# Patient Record
Sex: MALE | Race: WHITE | NOT HISPANIC OR LATINO | Employment: OTHER | URBAN - METROPOLITAN AREA
[De-identification: names, ages, dates, MRNs, and addresses within clinical notes are randomized per-mention and may not be internally consistent; named-entity substitution may affect disease eponyms.]

---

## 2017-01-09 ENCOUNTER — MEDICAL CORRESPONDENCE (OUTPATIENT)
Dept: HEALTH INFORMATION MANAGEMENT | Facility: CLINIC | Age: 72
End: 2017-01-09

## 2017-01-22 DIAGNOSIS — E09.9 DIABETES MELLITUS, DRUG-RELATED (H): Primary | ICD-10-CM

## 2017-01-22 NOTE — TELEPHONE ENCOUNTER
blood glucose monitoring (NO BRAND SPECIFIED) test strip      Last Written Prescription Date: 12-  Last Fill Quantity: 300 each,  # refills: 3   Last Office Visit with [facility], [facility] or [facility] prescribing provider: 08-

## 2017-01-23 NOTE — TELEPHONE ENCOUNTER
Prescription approved per [facility] [place] [place] Refill Protocol.  Viky Can, RN  Triage Nurse

## 2017-02-07 ENCOUNTER — TRANSFERRED RECORDS (OUTPATIENT)
Dept: HEALTH INFORMATION MANAGEMENT | Facility: CLINIC | Age: 72
End: 2017-02-07

## 2017-02-13 ENCOUNTER — TRANSFERRED RECORDS (OUTPATIENT)
Dept: HEALTH INFORMATION MANAGEMENT | Facility: CLINIC | Age: 72
End: 2017-02-13

## 2017-02-13 LAB
ALT SERPL-CCNC: 21 U/L (ref 5–40)
AST SERPL-CCNC: 18 U/L (ref 5–34)
CREAT SERPL-MCNC: 1.02 MG/DL (ref 0.5–1.3)

## 2017-03-20 ENCOUNTER — OFFICE VISIT (OUTPATIENT)
Dept: PEDIATRICS | Facility: CLINIC | Age: 72
End: 2017-03-20
Payer: COMMERCIAL

## 2017-03-20 VITALS
SYSTOLIC BLOOD PRESSURE: 104 MMHG | DIASTOLIC BLOOD PRESSURE: 50 MMHG | TEMPERATURE: 97.4 F | BODY MASS INDEX: 25.09 KG/M2 | HEART RATE: 72 BPM | HEIGHT: 75 IN | OXYGEN SATURATION: 96 % | WEIGHT: 201.8 LBS

## 2017-03-20 DIAGNOSIS — K68.2 RETROPERITONEAL FIBROSIS: ICD-10-CM

## 2017-03-20 DIAGNOSIS — L98.9 SKIN LESION: ICD-10-CM

## 2017-03-20 DIAGNOSIS — N18.1 TYPE 2 DIABETES MELLITUS WITH STAGE 1 CHRONIC KIDNEY DISEASE, WITH LONG-TERM CURRENT USE OF INSULIN (H): Primary | ICD-10-CM

## 2017-03-20 DIAGNOSIS — J44.9 CHRONIC OBSTRUCTIVE PULMONARY DISEASE, UNSPECIFIED COPD TYPE (H): ICD-10-CM

## 2017-03-20 DIAGNOSIS — Z79.4 TYPE 2 DIABETES MELLITUS WITH STAGE 1 CHRONIC KIDNEY DISEASE, WITH LONG-TERM CURRENT USE OF INSULIN (H): Primary | ICD-10-CM

## 2017-03-20 DIAGNOSIS — E11.22 TYPE 2 DIABETES MELLITUS WITH STAGE 1 CHRONIC KIDNEY DISEASE, WITH LONG-TERM CURRENT USE OF INSULIN (H): Primary | ICD-10-CM

## 2017-03-20 PROCEDURE — 99207 C FOOT EXAM  NO CHARGE: CPT | Performed by: INTERNAL MEDICINE

## 2017-03-20 PROCEDURE — 99214 OFFICE O/P EST MOD 30 MIN: CPT | Performed by: INTERNAL MEDICINE

## 2017-03-20 NOTE — LETTER
My COPD Action Plan   Name: Lauri Mcgee    YOB: 1945   Date: 3/20/2017    My doctor: Yenni Barber MD   My clinic: [address] [address] [phone]  My Controller Medicine: anoro   Dose:      My Rescue Medicine: Albuterol (Proair/Ventolin/Proventil) inhaler   Dose:      My Flare Up Medicine: prednisone   Dose:   My COPD Severity: Severe = FeV1 < 30%-49%     Use of Oxygen: Oxygen Not Prescribed         GREEN ZONE       Doing well today      Usual level of activity and exercise    Usual amount of cough and mucus    No shortness of breath    Usual level of health (thinking clearly, sleeping well, feel like eating) Actions:      Take daily medicines    Use oxygen as prescribed    Follow regular exercise and diet plan    Avoid cigarette smoke and other irritants that harm the lungs           YELLOW ZONE          Having a bad day or flare up      Short of breath more than usual    A lot more sputum (mucus) than usual    Sputum looks yellow, green, tan, brown or bloody    More coughing or wheezing    Fever or chills    Less energy; trouble completing activities    Trouble thinking or focusing    Using quick relief inhaler or nebulizer more often    Poor sleep; symptoms wake me up    Do not feel like eating Actions:      Get plenty of rest    Take daily medicines    Use quick relief inhaler every 2-4 hours    If you use oxygen, call you doctor to see if you should adjust your oxygen    Do breathing exercises or other things to help you relax    Let a loved one, friend or neighbor know you are feeling worse    Call your care team if you have 2 or more symptoms.  Start taking steroids or antibiotics if directed by your care team           RED ZONE       Need medical care now      Severe shortness of breath (feel you can't breathe)    Fever, chills    Not enough breath to do any activity    Trouble coughing up mucus, walking or  talking    Blood in mucus    Frequent coughing   Rescue medicines are not working    Not able to sleep because of breathing    Feel confused or drowsy    Chest pain    Actions:      Call your health care team.  If you cannot reach your care team, call 911 or go to the emergency room.        Electronically signed by: Yenni Barber, March 20, 2017  Annual Reminders:  Meet with Care Team, Flu Shot every Fall and Pneumonia Shot at least once  Pharmacy:    [facility] [place], [place] - [address] [facility] [place], [place] - [address]

## 2017-03-20 NOTE — NURSING NOTE
"Chief Complaint   Patient presents with     Diabetes       Initial /50 (Cuff Size: Adult Large)  Pulse 72  Temp 97.4  F (36.3  C)  Ht 6' 2.5\" (1.892 m)  Wt 201 lb 12.8 oz (91.5 kg)  SpO2 96%  BMI 25.56 kg/m2 Estimated body mass index is 25.56 kg/(m^2) as calculated from the following:    Height as of this encounter: 6' 2.5\" (1.892 m).    Weight as of this encounter: 201 lb 12.8 oz (91.5 kg).  Medication Reconciliation: complete   Alessandra Mendoza LPN    "

## 2017-03-20 NOTE — PROGRESS NOTES
SUBJECTIVE:                                                    Lauri Mcgee is a 71 year old male who presents to clinic today for the following health issues:        Diabetes Follow-up    Patient is checking blood sugars: once daily.  Results are as follows:         Random during the day,     Diabetic concerns: None     Symptoms of hypoglycemia (low blood sugar): none     Paresthesias (numbness or burning in feet) or sores: No     Date of last diabetic eye exam: 11/23/16     Hyperlipidemia Follow-Up      Rate your low fat/cholesterol diet?: not monitoring fat    Taking statin?  Yes, no muscle aches from statin    Other lipid medications/supplements?:  none     Hypertension Follow-up      Outpatient blood pressures are not being checked.    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  Additional history: as documented    Patient Active Problem List   Diagnosis     Retroperitoneal fibrosis     Other specified gastritis without mention of hemorrhage     CARDIOVASCULAR SCREENING; LDL GOAL LESS THAN 130     Advanced directives, counseling/discussion     Tubular adenoma of colon     COPD (chronic obstructive pulmonary disease) (H)     Type 2 diabetes mellitus with stage 1 chronic kidney disease, with long-term current use of insulin (H)     Past Surgical History   Procedure Laterality Date     C nonspecific procedure  1971     surgery for herniated disk     Tonsillectomy       Cardiac nuc kannan stress test nl  7/26/06     normal cardiolyte test       Social History   Substance Use Topics     Smoking status: Former Smoker     Packs/day: 0.50     Years: 47.00     Types: Cigarettes      "Quit date: 10/1/2013     Smokeless tobacco: Never Used      Comment: occ     Alcohol use 0.0 oz/week     0 Standard drinks or equivalent per week     Family History   Problem Relation Age of Onset     CEREBROVASCULAR DISEASE Father      HEART DISEASE Father      Circulatory Mother      anurism     Type 2 Diabetes Brother      Type 2 Diabetes Sister      Type 2 Diabetes Paternal Grandfather            Reviewed and updated as needed this visit by clinical staff  Tobacco  Allergies  Meds  Med Hx  Surg Hx  Fam Hx  Soc Hx      Reviewed and updated as needed this visit by Provider         ROS:  ROS of systems including Constitutional, Eyes, Respiratory, Cardiovascular, Gastroenterology, Genitourinary, Integumentary, Muscularskeletal, Psychiatric were all negative except for pertinent positives noted in my HPI.      OBJECTIVE:                                                    /50 (Cuff Size: Adult Large)  Pulse 72  Temp 97.4  F (36.3  C)  Ht 6' 2.5\" (1.892 m)  Wt 201 lb 12.8 oz (91.5 kg)  SpO2 96%  BMI 25.56 kg/m2  Body mass index is 25.56 kg/(m^2).  GENERAL: healthy, alert and no distress  NECK: no adenopathy, no asymmetry, masses, or scars and thyroid normal to palpation  RESP: lungs clear to auscultation - no rales, rhonchi or wheezes  CV: regular rate and rhythm, normal S1 S2, no S3 or S4, no murmur, click or rub, no peripheral edema and peripheral pulses strong  ABDOMEN: soft, nontender, no hepatosplenomegaly, no masses and bowel sounds normal  MS: no gross musculoskeletal defects noted, no edema  SKIN: R elbow with 3mm white scaly papule  Diabetic foot exam: normal DP and PT pulses, no trophic changes or ulcerative lesions, normal sensory exam and normal monofilament exam    Diagnostic Test Results:  none      ASSESSMENT/PLAN:                                                      1. Type 2 diabetes mellitus with stage 1 chronic kidney disease, with long-term current use of insulin (H)  - **A1C FUTURE " anytime; Future  - FOOT EXAM  - **Lipid panel reflex to direct LDL FUTURE anytime; Future  - **CBC with platelets FUTURE anytime; Future  - DIABETES EDUCATOR REFERRAL    2. Retroperitoneal fibrosis  Continues to follow with rheum and urology    3. Chronic obstructive pulmonary disease, unspecified COPD type (H)  Doing well  - COPD ACTION PLAN    4. Skin lesion  Possible AK.   - DERMATOLOGY REFERRAL    Patient Instructions   Let's keep your medications the same.  I really don't think you need to keep doing the novolin, but if you want to continue your sliding scale for now, you can.  Set up an appointment with diabetes education.  Call for an appointment with Dr. Guerra, dermatology.    Call to schedule a fasting lab appointment, [phone], or make an appointment online.    recommended he come in for preventive visit in 6 months.    Yenni Barber MD  [facility]

## 2017-03-20 NOTE — MR AVS SNAPSHOT
After Visit Summary   3/20/2017    Lauri Mcgee    MRN: [id number]           Patient Information     Date Of Birth          1945        Visit Information        Provider Department      3/20/2017 1:30 PM [facility]enni [facility] MD [facility]        Today's Diagnoses     Type 2 diabetes mellitus with stage 1 chronic kidney disease, with long-term current use of insulin (H)    -  1    Retroperitoneal fibrosis        Chronic obstructive pulmonary disease, unspecified COPD type (H)        Skin lesion          Care Instructions    Let's keep your medications the same.  I really don't think you need to keep doing the novolin, but if you want to continue your sliding scale for now, you can.  Set up an appointment with diabetes education.  Call for an appointment with Dr. Guerra, dermatology.    Call to schedule a fasting lab appointment, [phone], or make an appointment online.          Follow-ups after your visit        Additional Services     DERMATOLOGY REFERRAL       Your provider has referred you to: N: [facility] [phone]   [email or web address]    Please be aware that coverage of these services is subject to the terms and limitations of your health insurance plan.  Call member services at your health plan with any benefit or coverage questions.      Please bring the following with you to your appointment:    (1) Any X-Rays, CTs or MRIs which have been performed.  Contact the facility where they were done to arrange for  prior to your scheduled appointment.    (2) List of current medications  (3) This referral request   (4) Any documents/labs given to you for this referral            DIABETES EDUCATOR REFERRAL       DIABETES SELF MANAGEMENT TRAINING (DSMT)      Your provider has referred you to Diabetes Education: [facility]: Diabetes Education [facility] [phone]    [email or web address]    Type of training and number of hours: Previous Diagnosis: Follow-up DSMT - 2 hours.    Medicare covers: 10 hours of initial DSMT in 12 month period from the time of first visit, plus 2 hours of follow-up DSMT annually, and additional hours as requested for insulin training.    Diabetes Type: Type 2 - On Insulin             Diabetes Co-Morbidities: none               A1C Goal:  <7.0       A1C is: Lab Results       Component                Value               Date                       A1C                      6.3                 08/02/2016              If an urgent visit is needed or A1C is above 12, Care Team to call the Diabetes Education Team at [phone] or send an In Basket message to the Diabetes Education Pool (P DIAB ED-PATIENT CARE).    Diabetes Education Topics: Comprehensive Knowledge Assessment and Instruction    Special Educational Needs Requiring Individual DSMT: None       MEDICAL NUTRITION THERAPY (MNT) for Diabetes    Medical Nutrition Therapy with a Registered Dietitian can be provided in coordination with Diabetes Self-Management Training to assist in achieving optimal diabetes management.    MNT Type and Hours: Do not initiate MNT at this time.                       Medicare will cover: 3 hours initial MNT in 12 month period after first visit, plus 2 hours of follow-up MNT annually    Please be aware that coverage of these services is subject to the terms and limitations of your health insurance plan.  Call member services at your health plan to determine Diabetes Self-Management Training benefits and ask which blood glucose monitor brands are covered by your plan.      Please bring the following with you to your appointment:    (1)  List of current medications   (2)  List of Blood Glucose Monitor brands that are covered by your insurance plan  (3)  Blood Glucose Monitor and log book  (4)   Food records for the 3 days prior  "to your visit    The Certified Diabetes Educator may make diabetes medication adjustments per the CDE Protocol and Collaborative Practice Agreement.                  Future tests that were ordered for you today     Open Future Orders        Priority Expected Expires Ordered    **A1C FUTURE anytime Routine 3/20/2017 3/20/2018 3/20/2017    **Lipid panel reflex to direct LDL FUTURE anytime Routine 3/20/2017 3/20/2018 3/20/2017    **CBC with platelets FUTURE anytime Routine 3/20/2017 3/20/2018 3/20/2017            Who to contact     If you have questions or need follow up information about today's clinic visit or your schedule please contact [facility] [place] directly at [phone].  Normal or non-critical lab and imaging results will be communicated to you by Sayahhart, letter or phone within 4 business days after the clinic has received the results. If you do not hear from us within 7 days, please contact the clinic through [employer] or phone. If you have a critical or abnormal lab result, we will notify you by phone as soon as possible.  Submit refill requests through ApplyMap or call your pharmacy and they will forward the refill request to us. Please allow 3 business days for your refill to be completed.          Additional Information About Your Visit        [employer] Information     ApplyMap gives you secure access to your electronic health record. If you see a primary care provider, you can also send messages to your care team and make appointments. If you have questions, please call your primary care clinic.  If you do not have a primary care provider, please call [phone] and they will assist you.        Care EveryWhere ID     This is your Care EveryWhere ID. This could be used by other organizations to access your [facility] medical records  [id number]        Your Vitals Were     Pulse Temperature Height Pulse Oximetry BMI (Body Mass Index)       72 97.4  F (36.3  C) 6' 2.5\" (1.892 m) 96% 25.56 kg/m2     "    Blood Pressure from Last 3 Encounters:   03/20/17 104/50   08/02/16 108/60   01/11/16 116/62    Weight from Last 3 Encounters:   03/20/17 201 lb 12.8 oz (91.5 kg)   08/02/16 206 lb 12.8 oz (93.8 kg)   01/11/16 204 lb 3.2 oz (92.6 kg)              We Performed the Following     COPD ACTION PLAN     DERMATOLOGY REFERRAL     DIABETES EDUCATOR REFERRAL     FOOT EXAM        Primary Care Provider Office Phone # Fax #    Yenni Sewell [phone]       [facility] [address] [place] [address]        Thank you!     Thank you for choosing [facility]  for your care. Our goal is always to provide you with excellent care. Hearing back from our patients is one way we can continue to improve our services. Please take a few minutes to complete the written survey that you may receive in the mail after your visit with us. Thank you!             Your Updated Medication List - Protect others around you: Learn how to safely use, store and throw away your medicines at www.disposemymeds.org.          This list is accurate as of: 3/20/17  2:25 PM.  Always use your most recent med list.                   Brand Name Dispense Instructions for use    albuterol 90 MCG/ACT inhaler      Inhale 2 puffs into the lungs every 6 hours as needed.       aspirin 81 MG tablet      Take 1 tablet by mouth daily       atorvastatin 10 MG tablet    LIPITOR    90 tablet    Take 1 tablet (10 mg) by mouth daily       blood glucose monitoring lancets     3 Box    1 each daily Check blood glucose three times daily.       blood glucose monitoring test strip    no brand specified    300 each    1 strip by In Vitro route 3 times daily Contour next test strips       IMURAN 50 MG tablet   Generic drug:  azaTHIOprine     30 tablet    Take 150 mg by mouth daily.       insulin aspart 100 UNIT/ML injection    NovoLOG FLEXPEN    15 mL    Check your blood sugar before meals as directed. Cover with novolog as  follows: 151-200 = 2 units  201-250 = 4 units 251-300 = 6 units 301-350 = 8 units >350 = 10 units and recheck blood sugar in one hour.       metFORMIN 500 MG 24 hr tablet    GLUCOPHAGE-XR    360 tablet    Take 2 tablets (1,000 mg) by mouth 2 times daily (with meals)       omeprazole 20 MG CR capsule    priLOSEC    90    ONE DAILY       predniSONE 5 MG tablet    DELTASONE     Take 1 tablet by mouth daily       umeclidinium-vilanterol 62.5-25 MCG/INH oral inhaler    ANORO ELLIPTA     Inhale 1 puff into the lungs daily       vitamin D 1000 UNITS capsule      Take 1 capsule by mouth daily.

## 2017-03-20 NOTE — PATIENT INSTRUCTIONS
Let's keep your medications the same.  I really don't think you need to keep doing the novolin, but if you want to continue your sliding scale for now, you can.  Set up an appointment with diabetes education.  Call for an appointment with Dr. Guerra, dermatology.    Call to schedule a fasting lab appointment, [phone], or make an appointment online.

## 2017-03-21 DIAGNOSIS — N18.2 TYPE 2 DIABETES MELLITUS WITH STAGE 2 CHRONIC KIDNEY DISEASE (H): ICD-10-CM

## 2017-03-21 DIAGNOSIS — D53.9 MACROCYTIC ANEMIA: ICD-10-CM

## 2017-03-21 DIAGNOSIS — J44.9 CHRONIC OBSTRUCTIVE PULMONARY DISEASE, UNSPECIFIED COPD TYPE (H): ICD-10-CM

## 2017-03-21 DIAGNOSIS — E11.22 TYPE 2 DIABETES MELLITUS WITH STAGE 2 CHRONIC KIDNEY DISEASE (H): ICD-10-CM

## 2017-03-21 DIAGNOSIS — E11.22 TYPE 2 DIABETES MELLITUS WITH STAGE 1 CHRONIC KIDNEY DISEASE, WITH LONG-TERM CURRENT USE OF INSULIN (H): ICD-10-CM

## 2017-03-21 DIAGNOSIS — Z79.4 TYPE 2 DIABETES MELLITUS WITH STAGE 1 CHRONIC KIDNEY DISEASE, WITH LONG-TERM CURRENT USE OF INSULIN (H): ICD-10-CM

## 2017-03-21 DIAGNOSIS — N18.1 TYPE 2 DIABETES MELLITUS WITH STAGE 1 CHRONIC KIDNEY DISEASE, WITH LONG-TERM CURRENT USE OF INSULIN (H): ICD-10-CM

## 2017-03-21 DIAGNOSIS — R06.02 SOBOE (SHORTNESS OF BREATH ON EXERTION): ICD-10-CM

## 2017-03-21 LAB
CHOLEST SERPL-MCNC: 120 MG/DL
ERYTHROCYTE [DISTWIDTH] IN BLOOD BY AUTOMATED COUNT: 13.4 % (ref 10–15)
HBA1C MFR BLD: 6.1 % (ref 4.3–6)
HCT VFR BLD AUTO: 40.2 % (ref 40–53)
HDLC SERPL-MCNC: 51 MG/DL
HGB BLD-MCNC: 13.6 G/DL (ref 13.3–17.7)
IRON SATN MFR SERPL: 28 % (ref 15–46)
IRON SERPL-MCNC: 101 UG/DL (ref 35–180)
LDLC SERPL CALC-MCNC: 53 MG/DL
MCH RBC QN AUTO: 33.5 PG (ref 26.5–33)
MCHC RBC AUTO-ENTMCNC: 33.8 G/DL (ref 31.5–36.5)
MCV RBC AUTO: 99 FL (ref 78–100)
NONHDLC SERPL-MCNC: 69 MG/DL
PLATELET # BLD AUTO: 208 10E9/L (ref 150–450)
RBC # BLD AUTO: 4.06 10E12/L (ref 4.4–5.9)
TIBC SERPL-MCNC: 355 UG/DL (ref 240–430)
TRIGL SERPL-MCNC: 78 MG/DL
VIT B12 SERPL-MCNC: 215 PG/ML (ref 193–986)
WBC # BLD AUTO: 4.8 10E9/L (ref 4–11)

## 2017-03-21 PROCEDURE — 85027 COMPLETE CBC AUTOMATED: CPT | Performed by: INTERNAL MEDICINE

## 2017-03-21 PROCEDURE — 83550 IRON BINDING TEST: CPT | Performed by: INTERNAL MEDICINE

## 2017-03-21 PROCEDURE — 82607 VITAMIN B-12: CPT | Performed by: INTERNAL MEDICINE

## 2017-03-21 PROCEDURE — 36415 COLL VENOUS BLD VENIPUNCTURE: CPT | Performed by: INTERNAL MEDICINE

## 2017-03-21 PROCEDURE — 83036 HEMOGLOBIN GLYCOSYLATED A1C: CPT | Performed by: INTERNAL MEDICINE

## 2017-03-21 PROCEDURE — 83540 ASSAY OF IRON: CPT | Performed by: INTERNAL MEDICINE

## 2017-03-21 PROCEDURE — 80061 LIPID PANEL: CPT | Performed by: INTERNAL MEDICINE

## 2017-04-10 ENCOUNTER — ALLIED HEALTH/NURSE VISIT (OUTPATIENT)
Dept: EDUCATION SERVICES | Facility: CLINIC | Age: 72
End: 2017-04-10
Payer: COMMERCIAL

## 2017-04-10 DIAGNOSIS — N18.1 TYPE 2 DIABETES MELLITUS WITH STAGE 1 CHRONIC KIDNEY DISEASE, WITH LONG-TERM CURRENT USE OF INSULIN (H): Primary | ICD-10-CM

## 2017-04-10 DIAGNOSIS — E11.22 TYPE 2 DIABETES MELLITUS WITH STAGE 1 CHRONIC KIDNEY DISEASE, WITH LONG-TERM CURRENT USE OF INSULIN (H): Primary | ICD-10-CM

## 2017-04-10 DIAGNOSIS — Z79.4 TYPE 2 DIABETES MELLITUS WITH STAGE 1 CHRONIC KIDNEY DISEASE, WITH LONG-TERM CURRENT USE OF INSULIN (H): Primary | ICD-10-CM

## 2017-04-10 PROCEDURE — 99207 ZZC NO CHARGE LOS: CPT

## 2017-04-10 NOTE — NURSING NOTE
Diabetes Self Management Training: Individual Review Visit    Lauri Mcgee presents today for education related to Type 2 diabetes.    He is accompanied by self    Patient's diabetes management related comments/concerns: reports no concerns at this time    Patient's emotional response to diabetes: expresses readiness to learn and concern for health and well-being    Patient would like this visit to be focused around the following diabetes-related behaviors and goals: review    ASSESSMENT:  Patient Problem List and Family Medical History reviewed for relevant medical history, current medical status, and diabetes risk factors.    Current Diabetes Management per Patient:     Diabetes Medication(s)     Biguanides Sig    metFORMIN (GLUCOPHAGE-XR) 500 MG 24 hr tablet Take 2 tablets (1,000 mg) by mouth 2 times daily (with meals)    Insulin Sig    insulin aspart (NOVOLOG FLEXPEN) 100 UNIT/ML soln Check your blood sugar before meals as directed. Cover with novolog as follows:  151-200 = 2 units   201-250 = 4 units  251-300 = 6 units  301-350 = 8 units  >350 = 10 units and recheck blood sugar in one hour.        Problems taking diabetes medications regularly? No , Side effects? No     Past Diabetes Education: Yes    BG meter: Contour meter  BG results:        BG values are: In goal  Patient's most recent A1c is meeting goal of <7.0  Lab Results   Component Value Date    A1C 6.1 03/21/2017     Nutrition:  Patient eats 3 meals per day and has a consistent intake of carbohydrates    Breakfast - granola or muesli cereal, occasionally fruit  Lunch - sandwich with turkey or ham, vegetables, fruit  Snack - pretzels or peanuts  Dinner - tuna casserole or chicken  or pork chop, coleslaw or green beans, potato  Snacks - none    Beverages: water, milk    Cultural/Quaker diet restrictions: No     Biggest Challenge to Healthy Eating: eating away from home    Physical Activity:    Limitations: none  Walking    Diabetes  "Complications:  Not discussed today.    Vitals:  Estimated body mass index is 25.56 kg/(m^2) as calculated from the following:    Height as of 3/20/17: 1.892 m (6' 2.5\").    Weight as of 3/20/17: 91.5 kg (201 lb 12.8 oz).   Last 3 BP:   BP Readings from Last 3 Encounters:   03/20/17 104/50   08/02/16 108/60   01/11/16 116/62       History   Smoking Status     Former Smoker     Packs/day: 0.50     Years: 47.00     Types: Cigarettes     Quit date: 10/1/2013   Smokeless Tobacco     Never Used     Comment: occ       Labs:  Lab Results   Component Value Date    A1C 6.1 03/21/2017     Lab Results   Component Value Date     08/02/2016     Lab Results   Component Value Date    LDL 53 03/21/2017     HDL Cholesterol   Date Value Ref Range Status   03/21/2017 51 >39 mg/dL Final   ]  GFR Estimate   Date Value Ref Range Status   10/23/2016 71 >60 mL/min/1.7m2 Final     Comment:     Non  GFR Calc     GFR Estimate If Black   Date Value Ref Range Status   10/23/2016 86 >60 mL/min/1.7m2 Final     Comment:      GFR Calc     Lab Results   Component Value Date    CR 1.020 02/13/2017     No results found for: MICROALBUMIN    Socio/Economic Considerations:    Support system: not assessed    Health Beliefs and Attitudes:   Patient Activation Measure Survey Score:  MARGARITA Score (Last Two) 7/8/2009   MARGARITA Raw Score 34   Activation Score 43.4   MARGARITA Level 1       Stage of Change: MAINTENANCE (Working to maintain change, with risk of relapse)    Diabetes knowledge and skills assessment:     Patient is knowledgeable in diabetes management concepts related to: Healthy Eating, Monitoring and Taking Medication    Patient needs further education on the following diabetes management concepts: Problem Solving and Reducing Risks    Barriers to Learning Assessment: No Barriers identified    Based on learning assessment above, most appropriate setting for further diabetes education would be: Individual " setting.    INTERVENTION:   Education provided today on:  AADE Self-Care Behaviors:  Healthy Eating: answered nutrition questions, reinforced consistent CHO intake  Problem Solving: high blood glucose - causes, signs/symptoms, treatment and prevention, low blood glucose - causes, signs/symptoms, treatment and prevention and carrying a carbohydrate source at all times  Reducing Risks: major complications of diabetes, prevention, early diagnostic measures and treatment of complications, foot care, annual eye exam, A1C - goals, relating to blood glucose levels, how often to check, lipids levels and goals and blood pressure and goals    Opportunities for ongoing education and support in diabetes-self management were discussed.    Pt verbalized understanding of concepts discussed and recommendations provided today.       Education Materials Provided:  New Candice Contour Next EZ meter provided    PLAN:  No changes in the patient's current treatment plan    FOLLOW-UP:  Follow-up as needed.   Follow-up yearly for diabetes education review.  Chart routed to referring provider.    Ongoing plan for education and support: Written resources (magazines, books, etc.), Follow-up visit with diabetes educator  and Follow-up with primary care provider    Elva Nguyễn RD, [place]  Diabetes     Time Spent: 45 minutes  Encounter Type: Individual    Any diabetes medication dose changes were made via the CDE Protocol and Collaborative Practice Agreement with the patient's primary care provider. A copy of this encounter was shared with the provider.

## 2017-04-10 NOTE — MR AVS SNAPSHOT
After Visit Summary   4/10/2017    Lauri Mcgee    MRN: [id number]           Patient Information     Date Of Birth          1945        Visit Information        Provider Department      4/10/2017 9:30 AM  DIABETIC ED RESOURCE [facility]        Today's Diagnoses     Type 2 diabetes mellitus with stage 1 chronic kidney disease, with long-term current use of insulin (H)    -  1       Follow-ups after your visit        Who to contact     If you have questions or need follow up information about today's clinic visit or your schedule please contact [facility] directly at [phone].  Normal or non-critical lab and imaging results will be communicated to you by TransNethart, letter or phone within 4 business days after the clinic has received the results. If you do not hear from us within 7 days, please contact the clinic through Open Learningt or phone. If you have a critical or abnormal lab result, we will notify you by phone as soon as possible.  Submit refill requests through iconDial or call your pharmacy and they will forward the refill request to us. Please allow 3 business days for your refill to be completed.          Additional Information About Your Visit        MyChart Information     iconDial gives you secure access to your electronic health record. If you see a primary care provider, you can also send messages to your care team and make appointments. If you have questions, please call your primary care clinic.  If you do not have a primary care provider, please call [phone] and they will assist you.        Care EveryWhere ID     This is your Care EveryWhere ID. This could be used by other organizations to access your [facility] medical records  [id number]         Blood Pressure from Last 3 Encounters:   03/20/17 104/50   08/02/16 108/60   01/11/16 116/62    Weight from Last 3 Encounters:   03/20/17 91.5 kg (201 lb 12.8 oz)   08/02/16 93.8 kg (206 lb 12.8 oz)   01/11/16  92.6 kg (204 lb 3.2 oz)              Today, you had the following     No orders found for display       Primary Care Provider Office Phone # Fax #    Yenni Sewell [phone]       [facility] [address] [place] [address] [address] [place] [place] [address]        Thank you!     Thank you for choosing [facility]  for your care. Our goal is always to provide you with excellent care. Hearing back from our patients is one way we can continue to improve our services. Please take a few minutes to complete the written survey that you may receive in the mail after your visit with us. Thank you!             Your Updated Medication List - Protect others around you: Learn how to safely use, store and throw away your medicines at www.disposemymeds.org.          This list is accurate as of: 4/10/17 12:04 PM.  Always use your most recent med list.                   Brand Name Dispense Instructions for use    albuterol 90 MCG/ACT inhaler      Inhale 2 puffs into the lungs every 6 hours as needed.       aspirin 81 MG tablet      Take 1 tablet by mouth daily       atorvastatin 10 MG tablet    LIPITOR    90 tablet    Take 1 tablet (10 mg) by mouth daily       blood glucose monitoring lancets     3 Box    1 each daily Check blood glucose three times daily.       blood glucose monitoring test strip    no brand specified    300 each    1 strip by In Vitro route 3 times daily Contour next test strips       IMURAN 50 MG tablet   Generic drug:  azaTHIOprine     30 tablet    Take 150 mg by mouth daily.       insulin aspart 100 UNIT/ML injection    NovoLOG FLEXPEN    15 mL    Check your blood sugar before meals as directed. Cover with novolog as follows: 151-200 = 2 units  201-250 = 4 units 251-300 = 6 units 301-350 = 8 units >350 = 10 units and recheck blood sugar in one hour.       metFORMIN 500 MG 24 hr tablet    GLUCOPHAGE-XR    360 tablet    Take 2 tablets (1,000 mg) by mouth 2 times daily (with meals)        omeprazole 20 MG CR capsule    priLOSEC    90    ONE DAILY       predniSONE 5 MG tablet    DELTASONE     Take 1 tablet by mouth daily       umeclidinium-vilanterol 62.5-25 MCG/INH oral inhaler    ANORO ELLIPTA     Inhale 1 puff into the lungs daily       vitamin D 1000 UNITS capsule      Take 1 capsule by mouth daily.

## 2017-04-17 ENCOUNTER — TRANSFERRED RECORDS (OUTPATIENT)
Dept: SURGERY | Facility: CLINIC | Age: 72
End: 2017-04-17

## 2017-04-18 DIAGNOSIS — Z13.6 CARDIOVASCULAR SCREENING; LDL GOAL LESS THAN 130: ICD-10-CM

## 2017-04-18 NOTE — TELEPHONE ENCOUNTER
ATORVASTATIN 10MG     Last Written Prescription Date: 4/15/2016  Last Fill Quantity: 90, # refills: 3  Last Office Visit with G, P or [facility] prescribing provider: 3/20/2017       Lab Results   Component Value Date    CHOL 120 03/21/2017     Lab Results   Component Value Date    HDL 51 03/21/2017     Lab Results   Component Value Date    LDL 53 03/21/2017     Lab Results   Component Value Date    TRIG 78 03/21/2017     Lab Results   Component Value Date    CHOLHDLRATIO 2.5 04/07/2015

## 2017-04-19 RX ORDER — ATORVASTATIN CALCIUM 10 MG/1
TABLET, FILM COATED ORAL
Qty: 90 TABLET | Refills: 3 | Status: SHIPPED | OUTPATIENT
Start: 2017-04-19 | End: 2018-04-05

## 2017-04-19 NOTE — TELEPHONE ENCOUNTER
Prescription approved per [facility] Refill Protocol.  Viky Can, RN  Triage Nurse

## 2017-04-25 ENCOUNTER — TRANSFERRED RECORDS (OUTPATIENT)
Dept: HEALTH INFORMATION MANAGEMENT | Facility: CLINIC | Age: 72
End: 2017-04-25

## 2017-05-30 ENCOUNTER — TRANSFERRED RECORDS (OUTPATIENT)
Dept: HEALTH INFORMATION MANAGEMENT | Facility: CLINIC | Age: 72
End: 2017-05-30

## 2017-07-31 ENCOUNTER — TRANSFERRED RECORDS (OUTPATIENT)
Dept: HEALTH INFORMATION MANAGEMENT | Facility: CLINIC | Age: 72
End: 2017-07-31

## 2017-07-31 LAB
ALT SERPL-CCNC: 19 IU/L (ref 5–40)
AST SERPL-CCNC: 17 U/L (ref 5–34)
CREAT SERPL-MCNC: 0.95 MG/DL (ref 0.5–1.3)
GFR SERPL CREATININE-BSD FRML MDRD: 82.6 ML/MIN/1.73M2

## 2017-08-21 DIAGNOSIS — N18.2 TYPE 2 DIABETES MELLITUS WITH STAGE 2 CHRONIC KIDNEY DISEASE (H): ICD-10-CM

## 2017-08-21 DIAGNOSIS — E11.22 TYPE 2 DIABETES MELLITUS WITH STAGE 2 CHRONIC KIDNEY DISEASE (H): ICD-10-CM

## 2017-08-21 NOTE — TELEPHONE ENCOUNTER
metFORMIN (GLUCOPHAGE-XR) 500 MG 24 hr tablet         Last Written Prescription Date: 8/2/2016  Last Fill Quantity: 360, # refills: 3  Last Office Visit with G, P or [facility] prescribing provider:  3/20/2017        BP Readings from Last 3 Encounters:   03/20/17 104/50   08/02/16 108/60   01/11/16 116/62     Lab Results   Component Value Date    MICROL 11 01/05/2016     Lab Results   Component Value Date    UMALCR 5.07 01/05/2016     Creatinine   Date Value Ref Range Status   07/31/2017 0.950 0.500 - 1.300 mg/dL Final   ]  GFR Estimate   Date Value Ref Range Status   07/31/2017 82.6 ml/min/1.73m2 Final   10/23/2016 71 >60 mL/min/1.7m2 Final     Comment:     Non  GFR Calc   10/14/2016 70 >60 mL/min/1.7m2 Final     Comment:     Non  GFR Calc     GFR Estimate If Black   Date Value Ref Range Status   10/23/2016 86 >60 mL/min/1.7m2 Final     Comment:      GFR Calc   10/14/2016 85 >60 mL/min/1.7m2 Final     Comment:      GFR Calc   08/02/2016 85 >60 mL/min/1.7m2 Final     Comment:      GFR Calc     Lab Results   Component Value Date    CHOL 120 03/21/2017     Lab Results   Component Value Date    HDL 51 03/21/2017     Lab Results   Component Value Date    LDL 53 03/21/2017     Lab Results   Component Value Date    TRIG 78 03/21/2017     Lab Results   Component Value Date    CHOLHDLRATIO 2.5 04/07/2015     Lab Results   Component Value Date    AST 17 07/31/2017     Lab Results   Component Value Date    ALT 19 07/31/2017     Lab Results   Component Value Date    A1C 6.1 03/21/2017    A1C 6.3 08/02/2016    A1C 6.0 04/11/2016    A1C 5.9 01/05/2016    A1C 6.5 07/22/2015     Potassium   Date Value Ref Range Status   08/02/2016 4.0 3.4 - 5.3 mmol/L Final

## 2017-08-23 ENCOUNTER — MYC MEDICAL ADVICE (OUTPATIENT)
Dept: PEDIATRICS | Facility: CLINIC | Age: 72
End: 2017-08-23

## 2017-08-23 RX ORDER — METFORMIN HCL 500 MG
TABLET, EXTENDED RELEASE 24 HR ORAL
Qty: 360 TABLET | Refills: 0 | Status: SHIPPED | OUTPATIENT
Start: 2017-08-23 | End: 2017-11-16

## 2017-08-23 NOTE — TELEPHONE ENCOUNTER
Refill addressed in the refill encounter.  Patient advised he will be die for a physical in September.  Joyce Gaitan RN  Message handled by Nurse Triage.

## 2017-08-23 NOTE — TELEPHONE ENCOUNTER
Medication is being filled for 1 time refill only due to:  Patient needs to be seen because it has been more than one year since last visit.  Due for appointment in September.  Patient aware.    Joyce Gaitan RN  Message handled by Nurse Triage.

## 2017-09-11 ENCOUNTER — MEDICAL CORRESPONDENCE (OUTPATIENT)
Dept: HEALTH INFORMATION MANAGEMENT | Facility: CLINIC | Age: 72
End: 2017-09-11

## 2017-09-12 DIAGNOSIS — N13.30 HYDRONEPHROSIS: Primary | ICD-10-CM

## 2017-09-12 DIAGNOSIS — K68.2 RETROPERITONEAL FIBROSIS: ICD-10-CM

## 2017-09-25 ENCOUNTER — OFFICE VISIT (OUTPATIENT)
Dept: PEDIATRICS | Facility: CLINIC | Age: 72
End: 2017-09-25
Payer: COMMERCIAL

## 2017-09-25 VITALS
BODY MASS INDEX: 25.68 KG/M2 | WEIGHT: 206.5 LBS | DIASTOLIC BLOOD PRESSURE: 66 MMHG | OXYGEN SATURATION: 94 % | HEART RATE: 55 BPM | RESPIRATION RATE: 14 BRPM | HEIGHT: 75 IN | SYSTOLIC BLOOD PRESSURE: 112 MMHG

## 2017-09-25 DIAGNOSIS — Z79.4 TYPE 2 DIABETES MELLITUS WITH STAGE 1 CHRONIC KIDNEY DISEASE, WITH LONG-TERM CURRENT USE OF INSULIN (H): Primary | ICD-10-CM

## 2017-09-25 DIAGNOSIS — J44.9 CHRONIC OBSTRUCTIVE PULMONARY DISEASE, UNSPECIFIED COPD TYPE (H): ICD-10-CM

## 2017-09-25 DIAGNOSIS — Z12.5 SCREENING FOR PROSTATE CANCER: ICD-10-CM

## 2017-09-25 DIAGNOSIS — E11.22 TYPE 2 DIABETES MELLITUS WITH STAGE 1 CHRONIC KIDNEY DISEASE, WITH LONG-TERM CURRENT USE OF INSULIN (H): Primary | ICD-10-CM

## 2017-09-25 DIAGNOSIS — K21.9 GASTROESOPHAGEAL REFLUX DISEASE WITHOUT ESOPHAGITIS: ICD-10-CM

## 2017-09-25 DIAGNOSIS — K68.2 RETROPERITONEAL FIBROSIS: ICD-10-CM

## 2017-09-25 DIAGNOSIS — N18.1 TYPE 2 DIABETES MELLITUS WITH STAGE 1 CHRONIC KIDNEY DISEASE, WITH LONG-TERM CURRENT USE OF INSULIN (H): Primary | ICD-10-CM

## 2017-09-25 LAB
ANION GAP SERPL CALCULATED.3IONS-SCNC: 4 MMOL/L (ref 3–14)
BUN SERPL-MCNC: 20 MG/DL (ref 7–30)
CALCIUM SERPL-MCNC: 9.1 MG/DL (ref 8.5–10.1)
CHLORIDE SERPL-SCNC: 105 MMOL/L (ref 94–109)
CO2 SERPL-SCNC: 31 MMOL/L (ref 20–32)
CREAT SERPL-MCNC: 1.07 MG/DL (ref 0.66–1.25)
CREAT UR-MCNC: 208 MG/DL
GFR SERPL CREATININE-BSD FRML MDRD: 68 ML/MIN/1.7M2
GLUCOSE SERPL-MCNC: 111 MG/DL (ref 70–99)
HBA1C MFR BLD: 5.9 % (ref 4.3–6)
MICROALBUMIN UR-MCNC: 13 MG/L
MICROALBUMIN/CREAT UR: 6.44 MG/G CR (ref 0–17)
POTASSIUM SERPL-SCNC: 4.1 MMOL/L (ref 3.4–5.3)
PSA SERPL-ACNC: 0.65 UG/L (ref 0–4)
SODIUM SERPL-SCNC: 140 MMOL/L (ref 133–144)

## 2017-09-25 PROCEDURE — 99214 OFFICE O/P EST MOD 30 MIN: CPT | Performed by: INTERNAL MEDICINE

## 2017-09-25 PROCEDURE — 82043 UR ALBUMIN QUANTITATIVE: CPT | Performed by: INTERNAL MEDICINE

## 2017-09-25 PROCEDURE — G0103 PSA SCREENING: HCPCS | Performed by: INTERNAL MEDICINE

## 2017-09-25 PROCEDURE — 83036 HEMOGLOBIN GLYCOSYLATED A1C: CPT | Performed by: INTERNAL MEDICINE

## 2017-09-25 PROCEDURE — 36415 COLL VENOUS BLD VENIPUNCTURE: CPT | Performed by: INTERNAL MEDICINE

## 2017-09-25 PROCEDURE — 80048 BASIC METABOLIC PNL TOTAL CA: CPT | Performed by: INTERNAL MEDICINE

## 2017-09-25 RX ORDER — OMEPRAZOLE 10 MG/1
CAPSULE, DELAYED RELEASE ORAL
Qty: 90 CAPSULE | Refills: 3 | Status: SHIPPED | OUTPATIENT
Start: 2017-09-25 | End: 2018-06-25

## 2017-09-25 NOTE — LETTER
[facility]  [address] [id number] [phone]   October 4, 2017    Lauri Mcgee  [address] [address]        Mr. Mcgee,    Everything looks good.    As always, it was great to see you at your visit.    Please let me know if you have questions or concerns.    Best Regards,  Yenni Sewell M.D.      Results for orders placed or performed in visit on 09/25/17   Albumin Random Urine Quantitative with Creat Ratio   Result Value Ref Range    Creatinine Urine 208 mg/dL    Albumin Urine mg/L 13 mg/L    Albumin Urine mg/g Cr 6.44 0 - 17 mg/g Cr   Hemoglobin A1c   Result Value Ref Range    Hemoglobin A1C 5.9 4.3 - 6.0 %   PSA, screen   Result Value Ref Range    PSA 0.65 0 - 4 ug/L   Basic metabolic panel   Result Value Ref Range    Sodium 140 133 - 144 mmol/L    Potassium 4.1 3.4 - 5.3 mmol/L    Chloride 105 94 - 109 mmol/L    Carbon Dioxide 31 20 - 32 mmol/L    Anion Gap 4 3 - 14 mmol/L    Glucose 111 (H) 70 - 99 mg/dL    Urea Nitrogen 20 7 - 30 mg/dL    Creatinine 1.07 0.66 - 1.25 mg/dL    GFR Estimate 68 >60 mL/min/1.7m2    GFR Estimate If Black 82 >60 mL/min/1.7m2    Calcium 9.1 8.5 - 10.1 mg/dL

## 2017-09-25 NOTE — PROGRESS NOTES
SUBJECTIVE:   Lauri Mcgee is a 72 year old male who presents to clinic today for the following health issues:    Diabetes Follow-up    Patient is checking blood sugars: once daily.  Results are as follows:       Varies daily, but is before or after meals - before meals it usually ranges between  - after meals ranges between 110-150s    Diabetic concerns: None     Symptoms of hypoglycemia (low blood sugar): none     Paresthesias (numbness or burning in feet) or sores: Yes, numbness but states that is from a back surgery     Date of last diabetic eye exam: November 2016    Amount of exercise or physical activity: 4-5 days/week for an average of 15-30 minutes; usually once a day    Problems taking medications regularly: No    Medication side effects: none  Diet: regular (no restrictions) - just pays closer attention to what he eats      Hyperlipidemia Follow-Up      Rate your low fat/cholesterol diet?: good    Taking statin?  Yes, no muscle aches from statin    Other lipid medications/supplements?:  none    Hypertension Follow-up      Outpatient blood pressures are not being checked.    Low Salt Diet: no added salt    COPD Follow-Up    Symptoms are currently: stable    Current fatigue or dyspnea with ambulation: none    Shortness of breath: stable    Increased or change in Cough/Sputum: No    Fever(s): No    Baseline ambulation without stopping to rest:  Can golf 18 holes. Able to walk up 1 flights of stairs without stopping to rest.    Any ER/UC or hospital admissions since your last visit? No     History   Smoking Status     Former Smoker     Packs/day: 0.50     Years: 47.00     Types: Cigarettes     Quit date: 10/1/2013   Smokeless Tobacco     Never Used     Comment: occ     No results found for: FEV1, [id number]            Problem list and histories reviewed & adjusted, as indicated.  Additional history: as documented    Patient Active Problem List   Diagnosis     Retroperitoneal fibrosis      Gastroesophageal reflux disease without esophagitis     CARDIOVASCULAR SCREENING; LDL GOAL LESS THAN 130     Advanced directives, counseling/discussion     Tubular adenoma of colon     COPD (chronic obstructive pulmonary disease) (H)     Type 2 diabetes mellitus with stage 1 chronic kidney disease, with long-term current use of insulin (H)     Past Surgical History:   Procedure Laterality Date     C NONSPECIFIC PROCEDURE  1971    surgery for herniated disk     CARDIAC NUC ALEX STRESS TEST NL  7/26/06    normal cardiolyte test     TONSILLECTOMY         Social History   Substance Use Topics     Smoking status: Former Smoker     Packs/day: 0.50     Years: 47.00     Types: Cigarettes     Quit date: 10/1/2013     Smokeless tobacco: Never Used      Comment: occ     Alcohol use 0.0 oz/week     0 Standard drinks or equivalent per week     Family History   Problem Relation Age of Onset     CEREBROVASCULAR DISEASE Father      HEART DISEASE Father      Circulatory Mother      anurism     Type 2 Diabetes Brother      Type 2 Diabetes Sister      Type 2 Diabetes Paternal Grandfather          Current Outpatient Prescriptions   Medication Sig Dispense Refill     metFORMIN (GLUCOPHAGE-XR) 500 MG 24 hr tablet TAKE TWO TABLETS(1,000MG) BY MOUTH TWICE DAILY(WITH MEALS) 360 tablet 0     atorvastatin (LIPITOR) 10 MG tablet TAKE ONE TABLET BY MOUTH ONCE DAILY 90 tablet 3     blood glucose monitoring (NO BRAND SPECIFIED) test strip 1 strip by In Vitro route 3 times daily Contour next test strips 300 each 3     blood glucose monitoring (DAQUAN MICROLET) lancets 1 each daily Check blood glucose three times daily. 3 Box 3     insulin aspart (NOVOLOG FLEXPEN) 100 UNIT/ML soln Check your blood sugar before meals as directed. Cover with novolog as follows:  151-200 = 2 units   201-250 = 4 units  251-300 = 6 units  301-350 = 8 units  >350 = 10 units and recheck blood sugar in one hour. 15 mL 3     umeclidinium-vilanterol (ANORO ELLIPTA) 62.5-25  "MCG/INH oral inhaler Inhale 1 puff into the lungs daily        predniSONE (DELTASONE) 5 MG tablet Take 1 tablet by mouth daily       aspirin 81 MG tablet Take 1 tablet by mouth daily       Cholecalciferol (VITAMIN D) 1000 UNITS capsule Take 1 capsule by mouth daily.       azaTHIOprine (IMURAN) 50 MG tablet Take 150 mg by mouth daily. 30 tablet 1     albuterol 90 MCG/ACT inhaler Inhale 2 puffs into the lungs every 6 hours as needed.       OMEPRAZOLE 20 MG OR CPDR ONE DAILY 90 3         Reviewed and updated as needed this visit by clinical staff     Reviewed and updated as needed this visit by Provider         ROS:  Constitutional, HEENT, cardiovascular, pulmonary, GI, , musculoskeletal, neuro, skin, endocrine and psych systems are negative, except as otherwise noted.      OBJECTIVE:   /66 (BP Location: Right arm, Patient Position: Chair, Cuff Size: Adult Regular)  Pulse 55  Resp 14  Ht 6' 2.5\" (1.892 m)  Wt 206 lb 8 oz (93.7 kg)  SpO2 94%  BMI 26.16 kg/m2  Body mass index is 26.16 kg/(m^2).  GENERAL: healthy, alert and no distress  EYES: Eyes grossly normal to inspection, PERRL and conjunctivae and sclerae normal  HENT: ear canals and TM's normal, nose and mouth without ulcers or lesions  NECK: no adenopathy, no asymmetry, masses, or scars and thyroid normal to palpation  RESP: lungs clear to auscultation - no rales, rhonchi or wheezes  CV: regular rate and rhythm, normal S1 S2, no S3 or S4, no murmur, click or rub, no peripheral edema and peripheral pulses strong  ABDOMEN: soft, nontender, no hepatosplenomegaly, no masses and bowel sounds normal  MS: no gross musculoskeletal defects noted, no edema    Diagnostic Test Results:  Results for orders placed or performed in visit on 07/31/17   ALT   Result Value Ref Range    ALT 19 5 - 40 iu/l    Narrative    LAB RESULTS, ARTHRITIS/RHEUMATOLOGY CONSULTANTS   AST   Result Value Ref Range    AST 17 5 - 34 U/L    Narrative    LAB RESULTS, ARTHRITIS/RHEUMATOLOGY " CONSULTANTS   Creatinine   Result Value Ref Range    Creatinine 0.950 0.500 - 1.300 mg/dL    GFR Estimate 82.6 ml/min/1.73m2    Narrative    LAB RESULTS, ARTHRITIS/RHEUMATOLOGY CONSULTANTS       ASSESSMENT/PLAN:     1. Type 2 diabetes mellitus with stage 1 chronic kidney disease, with long-term current use of insulin (H)  Rarely using insulin dose. One dose in last month was right after flu shot. Discussed just stopping his novolin and watch blood sugars. Would leave metformin dose as it is for now.  - Albumin Random Urine Quantitative with Creat Ratio  - Hemoglobin A1c  - Basic metabolic panel    2. Gastroesophageal reflux disease without esophagitis  Will try weaning down on PPI and add H2 blocker.  - ranitidine (ZANTAC) 300 MG tablet; Take 1 tablet (300 mg) by mouth At Bedtime  Dispense: 90 tablet; Refill: 3  - omeprazole (PRILOSEC) 10 MG CR capsule; Take by mouth 30-60 minutes before a meal.  Dispense: 90 capsule; Refill: 3    3. Chronic obstructive pulmonary disease, unspecified COPD type (H)  Stable symptoms. Doing well    4. Retroperitoneal fibrosis  Stable. Continues to follow with his rheumatologist and urologist    5. Screening for prostate cancer    - PSA, screen    Patient Instructions   For diabetes:  Once your novolin vial expires, let's have you stop your insulin.  If you have >3 sugars >200 fasting or nonfasting, let me know and we can discuss whether to restart.    For your heartburn:  Start ranitidine 300 mg by mouth every evening.  Decrease omeprazole to 10 mg every day.   If you do well after 3-6 months on this regimen, we can consider stopping the omeprazole.  If you start having heartburn twice a week, we should increase the omeprazole back to 20 mg.      Yenni Barber MD  [facility]

## 2017-09-25 NOTE — PATIENT INSTRUCTIONS
For diabetes:  Once your novolin vial expires, let's have you stop your insulin.  If you have >3 sugars >200 fasting or nonfasting, let me know and we can discuss whether to restart.    For your heartburn:  Start ranitidine 300 mg by mouth every evening.  Decrease omeprazole to 10 mg every day.   If you do well after 3-6 months on this regimen, we can consider stopping the omeprazole.  If you start having heartburn twice a week, we should increase the omeprazole back to 20 mg.

## 2017-09-25 NOTE — MR AVS SNAPSHOT
After Visit Summary   9/25/2017    Lauri Mcgee    MRN: [id number]           Patient Information     Date Of Birth          1945        Visit Information        Provider Department      9/25/2017 7:30 AM [facility]enn[facility] Sewell MD [facility]        Today's Diagnoses     Retroperitoneal fibrosis    -  1    Chronic obstructive pulmonary disease, unspecified COPD type (H)        Type 2 diabetes mellitus with stage 1 chronic kidney disease, with long-term current use of insulin (H)        Gastroesophageal reflux disease without esophagitis        Screening for prostate cancer          Care Instructions    For diabetes:  Once your novolin vial expires, let's have you stop your insulin.  If you have >3 sugars >200 fasting or nonfasting, let me know and we can discuss whether to restart.    For your heartburn:  Start ranitidine 300 mg by mouth every evening.  Decrease omeprazole to 10 mg every day.   If you do well after 3-6 months on this regimen, we can consider stopping the omeprazole.  If you start having heartburn twice a week, we should increase the omeprazole back to 20 mg.          Follow-ups after your visit        Who to contact     If you have questions or need follow up information about today's clinic visit or your schedule please contact [facility] directly at [phone].  Normal or non-critical lab and imaging results will be communicated to you by MyChart, letter or phone within 4 business days after the clinic has received the results. If you do not hear from us within 7 days, please contact the clinic through MyChart or phone. If you have a critical or abnormal lab result, we will notify you by phone as soon as possible.  Submit refill requests through E[employer] or call your pharmacy and they will forward the refill request to us. Please allow 3 business days for your refill to be completed.          Additional Information About Your Visit        MyChart Information  "    [employer] gives you secure access to your electronic health record. If you see a primary care provider, you can also send messages to your care team and make appointments. If you have questions, please call your primary care clinic.  If you do not have a primary care provider, please call [phone] and they will assist you.        Care EveryWhere ID     This is your Care EveryWhere ID. This could be used by other organizations to access your [place] medical records  [id number]        Your Vitals Were     Pulse Respirations Height Pulse Oximetry BMI (Body Mass Index)       55 14 6' 2.5\" (1.892 m) 94% 26.16 kg/m2        Blood Pressure from Last 3 Encounters:   09/25/17 112/66   03/20/17 104/50   08/02/16 108/60    Weight from Last 3 Encounters:   09/25/17 206 lb 8 oz (93.7 kg)   03/20/17 201 lb 12.8 oz (91.5 kg)   08/02/16 206 lb 12.8 oz (93.8 kg)              We Performed the Following     Albumin Random Urine Quantitative with Creat Ratio     Basic metabolic panel     Hemoglobin A1c     PSA, screen          Today's Medication Changes          These changes are accurate as of: 9/25/17  8:14 AM.  If you have any questions, ask your nurse or doctor.               Start taking these medicines.        Dose/Directions    ranitidine 300 MG tablet   Commonly known as:  ZANTAC   Used for:  Gastroesophageal reflux disease without esophagitis   Started by:  Yenni Sewell MD        Dose:  300 mg   Take 1 tablet (300 mg) by mouth At Bedtime   Quantity:  90 tablet   Refills:  3         These medicines have changed or have updated prescriptions.        Dose/Directions    * omeprazole 20 MG CR capsule   Commonly known as:  priLOSEC   This may have changed:  Another medication with the same name was added. Make sure you understand how and when to take each.   Used for:  Other specified gastritis without mention of hemorrhage   Changed by:  Dipesh Berry MD        ONE DAILY   Quantity:  90   Refills:  3       * " omeprazole 10 MG CR capsule   Commonly known as:  priLOSEC   This may have changed:  You were already taking a medication with the same name, and this prescription was added. Make sure you understand how and when to take each.   Used for:  Gastroesophageal reflux disease without esophagitis   Changed by:  Yenni Sewell MD        Take by mouth 30-60 minutes before a meal.   Quantity:  90 capsule   Refills:  3       * Notice:  This list has 2 medication(s) that are the same as other medications prescribed for you. Read the directions carefully, and ask your doctor or other care provider to review them with you.         Where to get your medicines      These medications were sent to [id number] - [place], [place] - [address]  [address], [place] [address]     Phone:  [phone]     omeprazole 10 MG CR capsule    ranitidine 300 MG tablet                Primary Care Provider Office Phone # Fax #    Yenni Sewell [phone] [address] [place] [address]        Equal Access to Services     [facility] AH: Hadii aad ku hadasho Soomaali, waaxda luqadaha, qaybta kaalmada adeegyada, waxay ngocin haypam garrido . So [facility] [phone].    ATENCIÓN: Si habla español, tiene a tovar disposición servicios gratuitos de asistencia lingüística. [facility] [phone].    We comply with applicable federal civil rights laws and [place] laws. We do not discriminate on the basis of race, color, national origin, age, disability sex, sexual orientation or gender identity.            Thank you!     Thank you for choosing [facility]  for your care. Our goal is always to provide you with excellent care. Hearing back from our patients is one way we can continue to improve our services. Please take a few minutes to complete the written survey that you may receive in the mail after your visit with us. Thank you!             Your Updated Medication  List - Protect others around you: Learn how to safely use, store and throw away your medicines at www.disposemymeds.org.          This list is accurate as of: 9/25/17  8:14 AM.  Always use your most recent med list.                   Brand Name Dispense Instructions for use Diagnosis    albuterol 90 MCG/ACT inhaler      Inhale 2 puffs into the lungs every 6 hours as needed.        aspirin 81 MG tablet      Take 1 tablet by mouth daily        atorvastatin 10 MG tablet    LIPITOR    90 tablet    TAKE ONE TABLET BY MOUTH ONCE DAILY    CARDIOVASCULAR SCREENING; LDL GOAL LESS THAN 130       blood glucose monitoring lancets     3 Box    1 each daily Check blood glucose three times daily.    Diabetes mellitus, drug-related (H)       blood glucose monitoring test strip    no brand specified    300 each    1 strip by In Vitro route 3 times daily Contour next test strips    Diabetes mellitus, drug-related (H)       IMURAN 50 MG tablet   Generic drug:  azaTHIOprine     30 tablet    Take 150 mg by mouth daily.        insulin aspart 100 UNIT/ML injection    NovoLOG FLEXPEN    15 mL    Check your blood sugar before meals as directed. Cover with novolog as follows: 151-200 = 2 units  201-250 = 4 units 251-300 = 6 units 301-350 = 8 units >350 = 10 units and recheck blood sugar in one hour.    Type 2 diabetes mellitus with stage 2 chronic kidney disease (H)       metFORMIN 500 MG 24 hr tablet    GLUCOPHAGE-XR    360 tablet    TAKE TWO TABLETS(1,000MG) BY MOUTH TWICE DAILY(WITH MEALS)    Type 2 diabetes mellitus with stage 2 chronic kidney disease (H)       * omeprazole 20 MG CR capsule    priLOSEC    90    ONE DAILY    Other specified gastritis without mention of hemorrhage       * omeprazole 10 MG CR capsule    priLOSEC    90 capsule    Take by mouth 30-60 minutes before a meal.    Gastroesophageal reflux disease without esophagitis       predniSONE 5 MG tablet    DELTASONE     Take 1 tablet by mouth daily        ranitidine 300 MG  tablet    ZANTAC    90 tablet    Take 1 tablet (300 mg) by mouth At Bedtime    Gastroesophageal reflux disease without esophagitis       umeclidinium-vilanterol 62.5-25 MCG/INH oral inhaler    ANORO ELLIPTA     Inhale 1 puff into the lungs daily        vitamin D 1000 UNITS capsule      Take 1 capsule by mouth daily.        * Notice:  This list has 2 medication(s) that are the same as other medications prescribed for you. Read the directions carefully, and ask your doctor or other care provider to review them with you.

## 2017-09-25 NOTE — NURSING NOTE
"Chief Complaint   Patient presents with     Diabetes       Initial /66 (BP Location: Right arm, Patient Position: Chair, Cuff Size: Adult Regular)  Pulse 55  Resp 14  Ht 6' 2.5\" (1.892 m)  Wt 206 lb 8 oz (93.7 kg)  SpO2 94%  BMI 26.16 kg/m2 Estimated body mass index is 26.16 kg/(m^2) as calculated from the following:    Height as of this encounter: 6' 2.5\" (1.892 m).    Weight as of this encounter: 206 lb 8 oz (93.7 kg).  Medication Reconciliation: complete  Liza Roman CMA  "

## 2017-10-24 ENCOUNTER — TRANSFERRED RECORDS (OUTPATIENT)
Dept: HEALTH INFORMATION MANAGEMENT | Facility: CLINIC | Age: 72
End: 2017-10-24

## 2017-10-25 ENCOUNTER — HOSPITAL ENCOUNTER (OUTPATIENT)
Dept: LAB | Facility: CLINIC | Age: 72
Discharge: HOME | End: 2017-10-25
Attending: UROLOGY | Admitting: UROLOGY
Payer: MEDICARE

## 2017-10-25 DIAGNOSIS — N13.30 HYDRONEPHROSIS: ICD-10-CM

## 2017-10-25 DIAGNOSIS — K68.2 RETROPERITONEAL FIBROSIS: ICD-10-CM

## 2017-10-25 LAB
CREAT SERPL-MCNC: 1.07 MG/DL (ref 0.66–1.25)
GFR SERPL CREATININE-BSD FRML MDRD: 68 ML/MIN/1.7M2

## 2017-10-25 PROCEDURE — 36415 COLL VENOUS BLD VENIPUNCTURE: CPT | Performed by: UROLOGY

## 2017-10-25 PROCEDURE — 82565 ASSAY OF CREATININE: CPT | Performed by: UROLOGY

## 2017-11-16 DIAGNOSIS — E11.22 TYPE 2 DIABETES MELLITUS WITH STAGE 2 CHRONIC KIDNEY DISEASE (H): ICD-10-CM

## 2017-11-16 DIAGNOSIS — N18.2 TYPE 2 DIABETES MELLITUS WITH STAGE 2 CHRONIC KIDNEY DISEASE (H): ICD-10-CM

## 2017-11-20 RX ORDER — METFORMIN HCL 500 MG
TABLET, EXTENDED RELEASE 24 HR ORAL
Qty: 360 TABLET | Refills: 0 | Status: SHIPPED | OUTPATIENT
Start: 2017-11-20 | End: 2018-02-20

## 2017-11-20 NOTE — TELEPHONE ENCOUNTER
Prescription approved per [facility] [place] [facility].    Milagros Lawrence RN

## 2017-11-22 ENCOUNTER — TRANSFERRED RECORDS (OUTPATIENT)
Dept: HEALTH INFORMATION MANAGEMENT | Facility: CLINIC | Age: 72
End: 2017-11-22

## 2017-11-27 ENCOUNTER — TRANSFERRED RECORDS (OUTPATIENT)
Dept: HEALTH INFORMATION MANAGEMENT | Facility: CLINIC | Age: 72
End: 2017-11-27

## 2017-12-19 DIAGNOSIS — E11.22 TYPE 2 DIABETES MELLITUS WITH STAGE 2 CHRONIC KIDNEY DISEASE (H): ICD-10-CM

## 2017-12-19 DIAGNOSIS — N18.2 TYPE 2 DIABETES MELLITUS WITH STAGE 2 CHRONIC KIDNEY DISEASE (H): ICD-10-CM

## 2017-12-19 NOTE — TELEPHONE ENCOUNTER
Duplicate.     metFORMIN (GLUCOPHAGE-XR) 500 MG 24 hr tablet was filled on 11/20/2017, qty 360 with 0 refills.

## 2017-12-21 RX ORDER — METFORMIN HCL 500 MG
TABLET, EXTENDED RELEASE 24 HR ORAL
Qty: 360 TABLET | Refills: 0
Start: 2017-12-21

## 2018-01-25 ENCOUNTER — TRANSFERRED RECORDS (OUTPATIENT)
Dept: HEALTH INFORMATION MANAGEMENT | Facility: CLINIC | Age: 73
End: 2018-01-25

## 2018-01-25 LAB
ALT SERPL-CCNC: 23 IU/L (ref 5–40)
AST SERPL-CCNC: 21 U/L (ref 5–34)
CREAT SERPL-MCNC: 0.93 MG/DL (ref 0.5–1.3)
GFR SERPL CREATININE-BSD FRML MDRD: 84.4 ML/MIN/1.73M2

## 2018-02-08 ENCOUNTER — TRANSFERRED RECORDS (OUTPATIENT)
Dept: HEALTH INFORMATION MANAGEMENT | Facility: CLINIC | Age: 73
End: 2018-02-08

## 2018-02-20 DIAGNOSIS — E11.22 TYPE 2 DIABETES MELLITUS WITH STAGE 2 CHRONIC KIDNEY DISEASE (H): ICD-10-CM

## 2018-02-20 DIAGNOSIS — N18.2 TYPE 2 DIABETES MELLITUS WITH STAGE 2 CHRONIC KIDNEY DISEASE (H): ICD-10-CM

## 2018-02-20 NOTE — TELEPHONE ENCOUNTER
"Requested Prescriptions   Pending Prescriptions Disp Refills     metFORMIN (GLUCOPHAGE-XR) 500 MG 24 hr tablet [Pharmacy Med Name: MetFORMIN ER 500MG  TAB]    Last Written Prescription Date:  11/20/2017  Last Fill Quantity: 360,  # refills: 0   Last office visit: 9/25/2017 with prescribing provider:  Yenni Sewell     Future Office Visit:     360 tablet 0     Sig: TAKE TWO TABLETS BY MOUTH TWICE DAILY WITH MEALS    Biguanide Agents Passed    2/20/2018  7:27 AM       Passed - Blood pressure less than 140/90 in past 6 months    BP Readings from Last 3 Encounters:   09/25/17 112/66   03/20/17 104/50   08/02/16 108/60                Passed - Patient has documented LDL within the past 12 mos.    Recent Labs   Lab Test  03/21/17   0743   LDL  53            Passed - Patient has had a Microalbumin in the past 12 mos.    Recent Labs   Lab Test  09/25/17   0821   MICROL  13   UMALCR  6.44            Passed - Patient is age 10 or older       Passed - Patient has documented A1c within the specified period of time.    Recent Labs   Lab Test  09/25/17   0820   A1C  5.9            Passed - Patient's CR is NOT>1.4 OR Patient's EGFR is NOT<45 within past 12 mos.    Recent Labs   Lab Test  10/25/17   0935   GFRESTIMATED  68   GFRESTBLACK  82       Recent Labs   Lab Test  10/25/17   0935   CR  1.07            Passed - Patient does NOT have a diagnosis of CHF.       Passed - Recent (6 mos) or future visit with authorizing provider's specialty    Patient had office visit in the last 6 months or has a visit in the next 30 days with authorizing provider.  See \"Patient Info\" tab in inbasket, or \"Choose Columns\" in Meds & Orders section of the refill encounter.              "

## 2018-02-22 RX ORDER — METFORMIN HCL 500 MG
TABLET, EXTENDED RELEASE 24 HR ORAL
Qty: 360 TABLET | Refills: 0 | Status: SHIPPED | OUTPATIENT
Start: 2018-02-22 | End: 2018-05-21

## 2018-02-22 NOTE — TELEPHONE ENCOUNTER
This is sent x 1, patient due for diabetic check and labs in march.  Please call him to help schedule this.  Viky Can, RN  Triage Nurse

## 2018-03-10 DIAGNOSIS — E09.9 DIABETES MELLITUS, DRUG-RELATED (H): ICD-10-CM

## 2018-03-10 NOTE — TELEPHONE ENCOUNTER
"Requested Prescriptions   Pending Prescriptions Disp Refills     blood glucose monitoring (DAQUAN MICROLET) lancets [Pharmacy Med Name: MICROLET LANCETS MIS]  Last Written Prescription Date:  12/21/2016  Last Fill Quantity: 3 box,  # refills: 3   Last office visit: 9/25/2017 with prescribing provider:  Yenni Sewell MD [place], Erin E, MD    Future Office Visit:   Next 5 appointments (look out 90 days)     Mar 27, 2018  9:30 AM CDT   [facility]    [address] [place] [address]  [address]  [facility] [address]   [phone]                    200 each 5     Sig: USE ONE  TO CHECK GLUCOSE THREE TIMES DAILY    Diabetic Supplies Protocol Passed    3/10/2018  9:25 AM       Passed - Patient is 18 years of age or older       Passed - Recent (6 mo) or future (30 days) visit within the authorizing provider's specialty    Patient had office visit in the last 6 months or has a visit in the next 30 days with authorizing provider.  See \"Patient Info\" tab in inbasket, or \"Choose Columns\" in Meds & Orders section of the refill encounter.              "

## 2018-03-13 NOTE — TELEPHONE ENCOUNTER
Prescription approved per [facility].    Sivan RUBIO RN, BSN, PHN  [facility] [employer] RN

## 2018-03-27 ENCOUNTER — OFFICE VISIT (OUTPATIENT)
Dept: ENDOCRINOLOGY | Facility: CLINIC | Age: 73
End: 2018-03-27
Payer: COMMERCIAL

## 2018-03-27 ENCOUNTER — OFFICE VISIT (OUTPATIENT)
Dept: PEDIATRICS | Facility: CLINIC | Age: 73
End: 2018-03-27
Payer: COMMERCIAL

## 2018-03-27 VITALS
TEMPERATURE: 97.5 F | WEIGHT: 201.7 LBS | HEIGHT: 75 IN | DIASTOLIC BLOOD PRESSURE: 60 MMHG | BODY MASS INDEX: 25.08 KG/M2 | HEART RATE: 64 BPM | SYSTOLIC BLOOD PRESSURE: 110 MMHG | OXYGEN SATURATION: 94 %

## 2018-03-27 VITALS
HEART RATE: 63 BPM | OXYGEN SATURATION: 95 % | BODY MASS INDEX: 25.08 KG/M2 | WEIGHT: 201.72 LBS | HEIGHT: 75 IN | SYSTOLIC BLOOD PRESSURE: 118 MMHG | TEMPERATURE: 97.5 F | DIASTOLIC BLOOD PRESSURE: 52 MMHG

## 2018-03-27 DIAGNOSIS — E11.22 TYPE 2 DIABETES MELLITUS WITH STAGE 1 CHRONIC KIDNEY DISEASE, WITH LONG-TERM CURRENT USE OF INSULIN (H): Primary | ICD-10-CM

## 2018-03-27 DIAGNOSIS — E11.22 TYPE 2 DIABETES MELLITUS WITH STAGE 2 CHRONIC KIDNEY DISEASE, UNSPECIFIED LONG TERM INSULIN USE STATUS: ICD-10-CM

## 2018-03-27 DIAGNOSIS — E11.22 TYPE 2 DIABETES MELLITUS WITH STAGE 1 CHRONIC KIDNEY DISEASE, WITH LONG-TERM CURRENT USE OF INSULIN (H): ICD-10-CM

## 2018-03-27 DIAGNOSIS — K80.20 CALCULUS OF GALLBLADDER WITHOUT CHOLECYSTITIS WITHOUT OBSTRUCTION: ICD-10-CM

## 2018-03-27 DIAGNOSIS — N18.1 TYPE 2 DIABETES MELLITUS WITH STAGE 1 CHRONIC KIDNEY DISEASE, WITH LONG-TERM CURRENT USE OF INSULIN (H): Primary | ICD-10-CM

## 2018-03-27 DIAGNOSIS — Z79.4 TYPE 2 DIABETES MELLITUS WITH STAGE 1 CHRONIC KIDNEY DISEASE, WITH LONG-TERM CURRENT USE OF INSULIN (H): ICD-10-CM

## 2018-03-27 DIAGNOSIS — N18.2 TYPE 2 DIABETES MELLITUS WITH STAGE 2 CHRONIC KIDNEY DISEASE, UNSPECIFIED LONG TERM INSULIN USE STATUS: ICD-10-CM

## 2018-03-27 DIAGNOSIS — Z13.89 SCREENING FOR DIABETIC PERIPHERAL NEUROPATHY: ICD-10-CM

## 2018-03-27 DIAGNOSIS — J44.9 CHRONIC OBSTRUCTIVE PULMONARY DISEASE, UNSPECIFIED COPD TYPE (H): Primary | ICD-10-CM

## 2018-03-27 DIAGNOSIS — N18.1 TYPE 2 DIABETES MELLITUS WITH STAGE 1 CHRONIC KIDNEY DISEASE, WITH LONG-TERM CURRENT USE OF INSULIN (H): ICD-10-CM

## 2018-03-27 DIAGNOSIS — Z79.4 TYPE 2 DIABETES MELLITUS WITH STAGE 1 CHRONIC KIDNEY DISEASE, WITH LONG-TERM CURRENT USE OF INSULIN (H): Primary | ICD-10-CM

## 2018-03-27 LAB
ALBUMIN SERPL-MCNC: 4 G/DL (ref 3.4–5)
ALP SERPL-CCNC: 46 U/L (ref 40–150)
ALT SERPL W P-5'-P-CCNC: 30 U/L (ref 0–70)
ANION GAP SERPL CALCULATED.3IONS-SCNC: 5 MMOL/L (ref 3–14)
AST SERPL W P-5'-P-CCNC: 24 U/L (ref 0–45)
BILIRUB SERPL-MCNC: 0.5 MG/DL (ref 0.2–1.3)
BUN SERPL-MCNC: 17 MG/DL (ref 7–30)
CALCIUM SERPL-MCNC: 9.8 MG/DL (ref 8.5–10.1)
CHLORIDE SERPL-SCNC: 105 MMOL/L (ref 94–109)
CO2 SERPL-SCNC: 29 MMOL/L (ref 20–32)
CREAT SERPL-MCNC: 1.04 MG/DL (ref 0.66–1.25)
GFR SERPL CREATININE-BSD FRML MDRD: 70 ML/MIN/1.7M2
GLUCOSE SERPL-MCNC: 90 MG/DL (ref 70–99)
HBA1C MFR BLD: 6.3 % (ref 4.3–6)
POTASSIUM SERPL-SCNC: 4.1 MMOL/L (ref 3.4–5.3)
PROT SERPL-MCNC: 7 G/DL (ref 6.8–8.8)
SODIUM SERPL-SCNC: 139 MMOL/L (ref 133–144)

## 2018-03-27 PROCEDURE — 99213 OFFICE O/P EST LOW 20 MIN: CPT | Performed by: INTERNAL MEDICINE

## 2018-03-27 PROCEDURE — 80053 COMPREHEN METABOLIC PANEL: CPT | Performed by: INTERNAL MEDICINE

## 2018-03-27 PROCEDURE — 83036 HEMOGLOBIN GLYCOSYLATED A1C: CPT | Performed by: INTERNAL MEDICINE

## 2018-03-27 PROCEDURE — 99214 OFFICE O/P EST MOD 30 MIN: CPT | Performed by: INTERNAL MEDICINE

## 2018-03-27 PROCEDURE — 36415 COLL VENOUS BLD VENIPUNCTURE: CPT | Performed by: INTERNAL MEDICINE

## 2018-03-27 PROCEDURE — 99207 C FOOT EXAM  NO CHARGE: CPT | Performed by: INTERNAL MEDICINE

## 2018-03-27 NOTE — PROGRESS NOTES
SUBJECTIVE:   Lauri Mcgee is a 72 year old male who presents to clinic today for the following health issues:      Diabetes Follow-up    Patient is checking blood sugars: once daily.  Results are as follows:         random times, a llitle high post lunch and pre dinner 120's    Diabetic concerns: other - once a month has spikes above 200     Symptoms of hypoglycemia (low blood sugar): none     Paresthesias (numbness or burning in feet) or sores: No     Date of last diabetic eye exam: 11/27/17    Hyperlipidemia Follow-Up      Rate your low fat/cholesterol diet?: not monitoring fat    Taking statin?  Yes, no muscle aches from statin    Other lipid medications/supplements?:  none    Hypertension Follow-up      Outpatient blood pressures are not being checked.    Low Salt Diet: not monitoring salt    BP Readings from Last 2 Encounters:   03/27/18 110/60   09/25/17 112/66     Hemoglobin A1C (%)   Date Value   09/25/2017 5.9   03/21/2017 6.1 (H)     LDL Cholesterol Calculated (mg/dL)   Date Value   03/21/2017 53   04/11/2016 55       Amount of exercise or physical activity: 3 days/week for an average of 30-45 minutes    Problems taking medications regularly: No    Medication side effects: lowered imuran to 100 mg     Diet: portion control and pass on dessert and low carbs         Last 2 CT's have shown fatty liver and gall stones, one evening had upset stomach, came on fast and went away pretty fast.         COPD Follow-Up    Symptoms are currently: stable per [place] [facility]    Current fatigue or dyspnea with ambulation: worse with exercising    Shortness of breath: slightly worsened    Increased or change in Cough/Sputum: No    Fever(s): No    Baseline ambulation without stopping to rest:  Can walk flight of stairs can walk 20-30 minutes. Able to walk up 2 flights of stairs without stopping to rest.    Any ER/UC or hospital admissions since your last visit? No     History   Smoking Status     Former Smoker     Packs/day:  0.50     Years: 47.00     Types: Cigarettes     Quit date: 10/1/2013   Smokeless Tobacco     Never Used     Comment: occ     No results found for: FEV1, MBY0NVL    Problem list and histories reviewed & adjusted, as indicated.  Additional history: as documented    Patient Active Problem List   Diagnosis     Retroperitoneal fibrosis     Gastroesophageal reflux disease without esophagitis     CARDIOVASCULAR SCREENING; LDL GOAL LESS THAN 130     Advanced directives, counseling/discussion     Tubular adenoma of colon     COPD (chronic obstructive pulmonary disease) (H)     Type 2 diabetes mellitus with stage 1 chronic kidney disease, with long-term current use of insulin (H)     Past Surgical History:   Procedure Laterality Date     C NONSPECIFIC PROCEDURE  1971    surgery for herniated disk     CARDIAC NUC ALEX STRESS TEST NL  7/26/06    normal cardiolyte test     TONSILLECTOMY         Social History   Substance Use Topics     Smoking status: Former Smoker     Packs/day: 0.50     Years: 47.00     Types: Cigarettes     Quit date: 10/1/2013     Smokeless tobacco: Never Used      Comment: occ     Alcohol use 0.0 oz/week     0 Standard drinks or equivalent per week     Family History   Problem Relation Age of Onset     CEREBROVASCULAR DISEASE Father      HEART DISEASE Father      Circulatory Mother      anurism     Type 2 Diabetes Brother      Type 2 Diabetes Sister      Type 2 Diabetes Paternal Grandfather          Current Outpatient Prescriptions   Medication Sig Dispense Refill     fluticasone furoate (ARNUITY ELLIPTA) 100 MCG/ACT AEPB inhalation powder Inhale 1 puff into the lungs daily 1 each 5     blood glucose monitoring (DAQUAN MICROLET) lancets USE ONE  TO CHECK GLUCOSE THREE TIMES DAILY 200 each 5     metFORMIN (GLUCOPHAGE-XR) 500 MG 24 hr tablet TAKE TWO TABLETS BY MOUTH TWICE DAILY WITH MEALS 360 tablet 0     ranitidine (ZANTAC) 300 MG tablet Take 1 tablet (300 mg) by mouth At Bedtime 90 tablet 3     omeprazole  "(PRILOSEC) 10 MG CR capsule Take by mouth 30-60 minutes before a meal. 90 capsule 3     atorvastatin (LIPITOR) 10 MG tablet TAKE ONE TABLET BY MOUTH ONCE DAILY 90 tablet 3     blood glucose monitoring (NO BRAND SPECIFIED) test strip 1 strip by In Vitro route 3 times daily Contour next test strips 300 each 3     umeclidinium-vilanterol (ANORO ELLIPTA) 62.5-25 MCG/INH oral inhaler Inhale 1 puff into the lungs daily        predniSONE (DELTASONE) 5 MG tablet Take 1 tablet by mouth daily       aspirin 81 MG tablet Take 1 tablet by mouth daily       Cholecalciferol (VITAMIN D) 1000 UNITS capsule Take 1 capsule by mouth daily.       azaTHIOprine (IMURAN) 50 MG tablet Take 100 mg by mouth daily  30 tablet 1     albuterol 90 MCG/ACT inhaler Inhale 2 puffs into the lungs every 6 hours as needed.       insulin aspart (NOVOLOG FLEXPEN) 100 UNIT/ML injection Check your blood sugar before meals as directed. Cover with novolog as follows:  201-250 = 4 units  251-300 = 6 units  301-350 = 8 units  >350 = 10 units and recheck blood sugar in one hour. 15 mL 0       Reviewed and updated as needed this visit by clinical staff  Tobacco  Allergies  Meds  Med Hx  Surg Hx  Fam Hx  Soc Hx      Reviewed and updated as needed this visit by Provider         ROS:  Constitutional, HEENT, cardiovascular, pulmonary, GI, , musculoskeletal, neuro, skin, endocrine and psych systems are negative, except as otherwise noted.    OBJECTIVE:     /60 (Cuff Size: Adult Regular)  Pulse 64  Temp 97.5  F (36.4  C) (Oral)  Ht 6' 2.5\" (1.892 m)  Wt 201 lb 11.2 oz (91.5 kg)  SpO2 94%  BMI 25.55 kg/m2  Body mass index is 25.55 kg/(m^2).  GENERAL: healthy, alert and no distress  NECK: no adenopathy, no asymmetry, masses, or scars and thyroid normal to palpation  RESP: lungs clear to auscultation - no rales, rhonchi or wheezes  CV: regular rate and rhythm, normal S1 S2, no S3 or S4, no murmur, click or rub, no peripheral edema and peripheral pulses " strong  ABDOMEN: soft, nontender, no hepatosplenomegaly, no masses and bowel sounds normal  MS: no gross musculoskeletal defects noted, no edema  Diabetic foot exam: normal DP and PT pulses, no trophic changes or ulcerative lesions, normal sensory exam and normal monofilament exam    Diagnostic Test Results:  Results for orders placed or performed in visit on 03/27/18   HEMOGLOBIN A1C   Result Value Ref Range    Hemoglobin A1C 6.3 (H) 4.3 - 6.0 %   Comprehensive metabolic panel   Result Value Ref Range    Sodium 139 133 - 144 mmol/L    Potassium 4.1 3.4 - 5.3 mmol/L    Chloride 105 94 - 109 mmol/L    Carbon Dioxide 29 20 - 32 mmol/L    Anion Gap 5 3 - 14 mmol/L    Glucose 90 70 - 99 mg/dL    Urea Nitrogen 17 7 - 30 mg/dL    Creatinine 1.04 0.66 - 1.25 mg/dL    GFR Estimate 70 >60 mL/min/1.7m2    GFR Estimate If Black 84 >60 mL/min/1.7m2    Calcium 9.8 8.5 - 10.1 mg/dL    Bilirubin Total 0.5 0.2 - 1.3 mg/dL    Albumin 4.0 3.4 - 5.0 g/dL    Protein Total 7.0 6.8 - 8.8 g/dL    Alkaline Phosphatase 46 40 - 150 U/L    ALT 30 0 - 70 U/L    AST 24 0 - 45 U/L       ASSESSMENT/PLAN:     1. Chronic obstructive pulmonary disease, unspecified COPD type (H)  Stable. Doing well. Refilled.  - fluticasone furoate (ARNUITY ELLIPTA) 100 MCG/ACT AEPB inhalation powder; Inhale 1 puff into the lungs daily  Dispense: 1 each; Refill: 5    2. Type 2 diabetes mellitus with stage 1 chronic kidney disease, with long-term current use of insulin (H)  Good control. He does have random elevated glucose levels without real reason. Suspect this is due to prednisone. Discussed whether it's reasonable to continue on novolog sliding scale, which he rarely uses. Also discussed option to speak with endocrinology due to his many questions and interested in how the prednisone is affecting him.  - Comprehensive metabolic panel    3. Calculus of gallbladder without cholecystitis without obstruction  Noted gallstones. Has had several episodes of self limited  RUQ pain. Plan consult with surgery.  - GENERAL SURG ADULT REFERRAL  - Comprehensive metabolic panel    4. Screening for diabetic peripheral neuropathy    - FOOT EXAM  NO CHARGE [91491.114]  - HEMOGLOBIN A1C  - COPD ACTION PLAN    Patient Instructions   Let's have you meet with Dr. Seymour to discuss novolog.   I don't think you still need this, but I know it's bothersome to you when your sugars are above 200.     Call for an appointment with Dr. Knight to discuss your gallstones and the episode of pain you had a few months ago.    Add arnuity ellipta 1 puff once a day.    OK to try stopping omeprazole.    At your next visit, we should consider starting a medicine to protect kidneys with diabetes. (lisinopril or losartan)      Yenni Barber MD  [facility]

## 2018-03-27 NOTE — LETTER
My COPD Action Plan     Name: Lauri Mcgee    YOB: 1945   Date: 3/27/2018    My doctor: Yenni Barber MD   My clinic: [address]  [facility] [address] [phone]  My Controller Medicine: Vilanterol/umeclidinium (Anoro Ellipta)   Dose: daily     My Rescue Medicine: albuterol inhaler as needed   Dose: prn     My Flare Up Medicine: Prednisone   Dose: daily     My COPD Severity: as stated in chart      Use of Oxygen: Oxygen Not Prescribed      Make sure you've had your pneumonia   vaccines.          GREEN ZONE       Doing well today      Usual level of activity and exercise    Usual amount of cough and mucus    No shortness of breath    Usual level of health (thinking clearly, sleeping well, feel like eating) Actions:      Take daily medicines    Use oxygen as prescribed    Follow regular exercise and diet plan    Avoid cigarette smoke and other irritants that harm the lungs           YELLOW ZONE          Having a bad day or flare up      Short of breath more than usual    A lot more sputum (mucus) than usual    Sputum looks yellow, green, tan, brown or bloody    More coughing or wheezing    Fever or chills    Less energy; trouble completing activities    Trouble thinking or focusing    Using quick relief inhaler or nebulizer more often    Poor sleep; symptoms wake me up    Do not feel like eating Actions:      Get plenty of rest    Take daily medicines    Use quick relief inhaler every 6 hours    If you use oxygen, call you doctor to see if you should adjust your oxygen    Do breathing exercises or other things to help you relax    Let a loved one, friend or neighbor know you are feeling worse    Call your care team if you have 2 or more symptoms.  Start taking steroids or antibiotics if directed by your care team           RED ZONE       Need medical care now      Severe shortness of breath (feel you can't breathe)    Fever,  chills    Not enough breath to do any activity    Trouble coughing up mucus, walking or talking    Blood in mucus    Frequent coughing   Rescue medicines are not working    Not able to sleep because of breathing    Feel confused or drowsy    Chest pain    Actions:      Call your health care team.  If you cannot reach your care team, call 911 or go to the emergency room.        Annual Reminders:  Meet with Care Team, Flu Shot every Fall  Pharmacy:    [facility] [place], [place] - [address]  [facility] - [place], [place] - [address]

## 2018-03-27 NOTE — NURSING NOTE
"Chief Complaint   Patient presents with     Referral     Dr Sewell Type 2        Initial /52 (BP Location: Right arm, Patient Position: Chair, Cuff Size: Adult Large)  Pulse 63  Temp 97.5  F (36.4  C) (Oral)  Ht 1.892 m (6' 2.5\")  Wt 91.5 kg (201 lb 11.5 oz)  SpO2 95%  BMI 25.55 kg/m2 Estimated body mass index is 25.55 kg/(m^2) as calculated from the following:    Height as of this encounter: 1.892 m (6' 2.5\").    Weight as of this encounter: 91.5 kg (201 lb 11.5 oz).  Medication Reconciliation: complete     ENDOCRINOLOGY INTAKE FORM    Patient Name:  Lauri Mcgee  :  1945    Is patient Diabetic?   Yes: type 2   Does patient have non-diabetic or other endocrine issues?  No    Vitals: /52 (BP Location: Right arm, Patient Position: Chair, Cuff Size: Adult Large)  Pulse 63  Temp 97.5  F (36.4  C) (Oral)  Ht 1.892 m (6' 2.5\")  Wt 91.5 kg (201 lb 11.5 oz)  SpO2 95%  BMI 25.55 kg/m2  BMI= Body mass index is 25.55 kg/(m^2).    Flu vaccine:  Yes: 17  Pneumonia vaccine:  Yes: 1/26/15, 12    Smoking and Alcohol use:  Social History   Substance Use Topics     Smoking status: Former Smoker     Packs/day: 0.50     Years: 47.00     Types: Cigarettes     Quit date: 10/1/2013     Smokeless tobacco: Never Used      Comment: occ     Alcohol use 0.0 oz/week     0 Standard drinks or equivalent per week       Foot Exam: Yes: 3/27/18  Eye Exam:  Yes: 17  Dental Exam:  Yes:   Aspirin Use:  Yes: 81 mg daily     Lab Results   Component Value Date    A1C 6.3 2018    A1C 5.9 2017    A1C 6.1 2017    A1C 6.3 2016    A1C 6.0 2016       Lab Results   Component Value Date    MICROL 13 2017     No results found for: MICROALBUMIN      Staff Signature:  Prabha Lopez CMA (AAMA)    \  " Podiatry Consult    Subjective:           Patient is a 50 y.o.  male who is being seen for left diabetic foot infection. He reports recent admission for abx and local wound care, and was sen to the ER today after being seen in the [facility] wound care center. He denies any recent n/f/v but possible chills in the last 24 hours. Noted swelling and warmth increase of the left leg today. Noted positive drainage to the left foot wounds with malodor. Has been NPO since last night. Patient Active Problem List    Diagnosis Date Noted    Edema of left lower extremity 02/17/2017    Diabetic infection of right foot ([address] 02/16/2017    IDDM (insulin dependent diabetes mellitus) ([address] 10/06/2016    Diabetes ([address]     HTN (hypertension)     Hypercholesteremia     HTN (hypertension) 07/23/2013     Past Medical History:   Diagnosis Date    Diabetes ([address]     since age 22   [facility] DM (diabetes mellitus) [facility]     HTN (hypertension)     Hypercholesteremia     Hyperlipidemia       Family History   Problem Relation Age of Onset    Hypertension Mother     High Cholesterol Mother       Social History   Substance Use Topics    Smoking status: Former Smoker     Types: Cigars     Quit date: 4/22/2011    Smokeless tobacco: Never Used    Alcohol use 0.0 oz/week     1 Glasses of wine, 0 Standard drinks or equivalent per week      Comment: stop drinking 5 months ago     Past Surgical History:   Procedure Laterality Date    HX AMPUTATION      toes- left foot    HX BUNIONECTOMY      HX ORTHOPAEDIC      boutinerre deformity    HX ORTHOPAEDIC      fascia removed left foot    HX TONSILLECTOMY      HX WISDOM TEETH EXTRACTION        Prior to Admission medications    Medication Sig Start Date End Date Taking? Authorizing Provider   cholecalciferol (VITAMIN D3) 1,000 unit cap Take  by mouth daily.     Phys Other, MD   metFORMIN (GLUCOPHAGE) 500 mg tablet TAKE 1 TABLET BY MOUTH TWICE A DAY WITH MEALS AND INCREASE AS DIRECTED 1/3/17   Erasmo Perez MD   gabapentin (NEURONTIN) 100 mg capsule Take 1 Cap by mouth three (3) times daily. Take as needed for pain -caution this may make you drowsy 16   [address] [place], MD   insulin lispro (HUMALOG) 100 unit/mL kwikpen 12 Units by SubCUTAneous route Before breakfast, lunch, and dinner. DX: E11.65 16   Erasmo Perez MD   insulin detemir (LEVEMIR FLEXTOUCH) 100 unit/mL (3 mL) inpn 0.6 mL by SubCUTAneous route nightly. 60 units QHS 10/6/16   Erasmo Perez MD   esomeprazole (NEXIUM) 40 mg capsule Take 1 Cap by mouth daily. 10/6/16   Erasmo Perez MD   Insulin Needles, Disposable, (LASHA PEN NEEDLE) 32 gauge x 32\" ndle USe to inject insulin Dx: 250.02 10/6/16   Erasmo Perez MD   sodium chloride (SALINE NASAL) 0.65 % nasal spray 1 [place] by Both Nostrils route as needed for Congestion. 10/6/16   [address] [place], MD   lisinopril (PRINIVIL, ZESTRIL) 40 mg tablet Take 1 Tab by mouth daily. 16   Kecia Bernabe MD   atorvastatin (LIPITOR) 40 mg tablet Take 1 Tab by mouth daily. 16   Kecia Bernabe MD   glucose blood VI test strips (BLOOD GLUCOSE TEST) strip Checks qac and qhs. Dx: E11.65 Pt uses the True-Trak strips 16   María Conde MD   insulin syringe-needle U-100 Dispense ultrafine 1 mL syringes with 31 gauge needle 9/1/15   Lane Sauer MD   Blood-Glucose Meter monitoring kit Use to check BS qid 2/23/15   Lane Sauer MD   Lancets Misc Use to check sugars 13   Lane Sauer MD     Allergies   Allergen Reactions    Morphine Angioedema        Review of Systems:  A comprehensive review of systems was negative except for that written in the HPI.     Objective:     Patient Vitals for the past 8 hrs:   BP Temp Pulse Resp SpO2 Height Weight   17 1230 153/89 - 77 16 98 % - -   17 0924 (!) 151/97 99.1 °F (37.3 °C) 78 16 99 % 6' 3\" (1.905 m) 132 kg (291 lb 0.1 oz)     Temp (24hrs), Av.1 °F (37.3 °C), Min:99.1 °F (37.3 °C), Max:99.1 °F (37.3 °C)      Gen: AAOx3, NAD  Vasc: Palpable DP and PT pulses to the left leg and right leg. Noted 2+ swelling to the LLE. Neuro: Sensation intact distally. Msk: Pain to palpation of the left forefoot  Derm: Noted TMA with left distal foot full thickness ulcer with purulent drainage and surrounding warmth. Noted lateral midfoot fuill thickness ulcer that also probes to bone. Data Review:   Recent Results (from the past 24 hour(s))   CBC WITH AUTOMATED DIFF    Collection Time: 02/24/17 10:16 AM   Result Value Ref Range    WBC 11.2 (H) 4.1 - 11.1 K/uL    RBC 4.57 4.10 - 5.70 M/uL    HGB 11.4 (L) 12.1 - 17.0 g/dL    HCT 36.3 (L) 36.6 - 50.3 %    MCV 79.4 (L) 80.0 - 99.0 FL    MCH 24.9 (L) 26.0 - 34.0 PG    MCHC 31.4 30.0 - 36.5 g/dL    RDW 13.7 11.5 - 14.5 %    PLATELET 811 230 - 415 K/uL    NEUTROPHILS 70 32 - 75 %    LYMPHOCYTES 21 12 - 49 %    MONOCYTES 8 5 - 13 %    EOSINOPHILS 1 0 - 7 %    BASOPHILS 0 0 - 1 %    ABS. NEUTROPHILS 7.8 1.8 - 8.0 K/UL    ABS. LYMPHOCYTES 2.4 0.8 - 3.5 K/UL    ABS. MONOCYTES 0.9 0.0 - 1.0 K/UL    ABS. EOSINOPHILS 0.1 0.0 - 0.4 K/UL    ABS. BASOPHILS 0.0 0.0 - 0.1 K/UL    RBC COMMENTS NORMOCYTIC, NORMOCHROMIC      DF SMEAR SCANNED     METABOLIC PANEL, COMPREHENSIVE    Collection Time: 02/24/17 10:16 AM   Result Value Ref Range    Sodium 139 136 - 145 mmol/L    Potassium 3.9 3.5 - 5.1 mmol/L    Chloride 103 97 - 108 mmol/L    CO2 26 21 - 32 mmol/L    Anion gap 10 5 - 15 mmol/L    Glucose 100 65 - 100 mg/dL    BUN 13 6 - 20 MG/DL    Creatinine 1.28 0.70 - 1.30 MG/DL    BUN/Creatinine ratio 10 (L) 12 - 20      GFR est AA >60 >60 ml/min/1.73m2    GFR est non-AA >60 >60 ml/min/1.73m2    Calcium 8.3 (L) 8.5 - 10.1 MG/DL    Bilirubin, total 0.5 0.2 - 1.0 MG/DL    ALT (SGPT) 23 12 - 78 U/L    AST (SGOT) 21 15 - 37 U/L    Alk.  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

## 2018-03-27 NOTE — MR AVS SNAPSHOT
After Visit Summary   3/27/2018    Lauri Mcgee    MRN: [id number]           Patient Information     Date Of Birth          1945        Visit Information        Provider Department      3/27/2018 9:30 AM Yenni Sewell MD [facility]        Today's Diagnoses     Chronic obstructive pulmonary disease, unspecified COPD type (H)    -  1    Type 2 diabetes mellitus with stage 1 chronic kidney disease, with long-term current use of insulin (H)        Calculus of gallbladder without cholecystitis without obstruction        Screening for diabetic peripheral neuropathy          Care Instructions    Let's have you meet with Dr. Seymour to discuss novolog.   I don't think you still need this, but I know it's bothersome to you when your sugars are above 200.     Call for an appointment with Dr. Knight to discuss your gallstones and the episode of pain you had a few months ago.    Add arnuity ellipta 1 puff once a day.    OK to try stopping omeprazole.    At your next visit, we should consider starting a medicine to protect kidneys with diabetes. (lisinopril or losartan)          Follow-ups after your visit        Additional Services     GENERAL SURG ADULT REFERRAL       Your provider has referred you to: FM[facility]: [facility] [place] [phone]   [email or web address]    Please be aware that coverage of these services is subject to the terms and limitations of your health insurance plan.  Call member services at your health plan with any benefit or coverage questions.      Please bring the following with you to your appointment:    (1) Any X-Rays, CTs or MRIs which have been performed.  Contact the facility where they were done to arrange for  prior to your scheduled appointment.   (2) List of current medications   (3) This referral request   (4) Any documents/labs given to you for this referral                  Follow-up notes  "from your care team     Return in about 3 months (around 6/27/2018).      Who to contact     If you have questions or need follow up information about today's clinic visit or your schedule please contact [facility] [place] directly at [phone].  Normal or non-critical lab and imaging results will be communicated to you by MyChart, letter or phone within 4 business days after the clinic has received the results. If you do not hear from us within 7 days, please contact the clinic through [employer] or phone. If you have a critical or abnormal lab result, we will notify you by phone as soon as possible.  Submit refill requests through [employer] or call your pharmacy and they will forward the refill request to us. Please allow 3 business days for your refill to be completed.          Additional Information About Your Visit        [employer] Information     [employer] gives you secure access to your electronic health record. If you see a primary care provider, you can also send messages to your care team and make appointments. If you have questions, please call your primary care clinic.  If you do not have a primary care provider, please call [phone] and they will assist you.        Care EveryWhere ID     This is your Care EveryWhere ID. This could be used by other organizations to access your [facility] medical records  [id number]        Your Vitals Were     Pulse Temperature Height Pulse Oximetry BMI (Body Mass Index)       64 97.5  F (36.4  C) (Oral) 6' 2.5\" (1.892 m) 94% 25.55 kg/m2        Blood Pressure from Last 3 Encounters:   03/27/18 110/60   09/25/17 112/66   03/20/17 104/50    Weight from Last 3 Encounters:   03/27/18 201 lb 11.2 oz (91.5 kg)   09/25/17 206 lb 8 oz (93.7 kg)   03/20/17 201 lb 12.8 oz (91.5 kg)              We Performed the Following     Comprehensive metabolic panel     COPD ACTION PLAN     FOOT EXAM  NO CHARGE [[id number]]     GENERAL SURG ADULT REFERRAL     HEMOGLOBIN A1C          Today's " Medication Changes          These changes are accurate as of 3/27/18 10:35 AM.  If you have any questions, ask your nurse or doctor.               Start taking these medicines.        Dose/Directions    fluticasone furoate 100 MCG/ACT Aepb inhalation powder   Commonly known as:  ARNUITY ELLIPTA   Used for:  Chronic obstructive pulmonary disease, unspecified COPD type (H)   Started by:  Yenni Sewell MD        Dose:  1 puff   Inhale 1 puff into the lungs daily   Quantity:  1 each   Refills:  5            Where to get your medicines      These medications were sent to [id number] - [place] - [address]  [address], [place] [address]     Phone:  [phone]     fluticasone furoate 100 MCG/ACT Aepb inhalation powder                Primary Care Provider Office Phone # Fax #    Yenni Sewell [phone] [address] [place] [address]        Equal Access to Services     [facility]: Hadii aad ku hadasho Soomaali, waaxda luqadaha, qaybta kaalmada adeegyada, waxay tyrel haydavonten carlos garrido . So [facility] [phone].    ATENCIÓN: Si habla español, tiene a tovar disposición servicios gratuitos de asistencia lingüística. Ll[facility] [phone].    We comply with applicable federal civil rights laws and [place] laws. We do not discriminate on the basis of race, color, national origin, age, disability, sex, sexual orientation, or gender identity.            Thank you!     Thank you for choosing [facility]  for your care. Our goal is always to provide you with excellent care. Hearing back from our patients is one way we can continue to improve our services. Please take a few minutes to complete the written survey that you may receive in the mail after your visit with us. Thank you!             Your Updated Medication List - Protect others around you: Learn how to safely use, store and throw away your medicines at  www.disposemymeds.org.          This list is accurate as of 3/27/18 10:35 AM.  Always use your most recent med list.                   Brand Name Dispense Instructions for use Diagnosis    albuterol 90 MCG/ACT inhaler      Inhale 2 puffs into the lungs every 6 hours as needed.        aspirin 81 MG tablet      Take 1 tablet by mouth daily        atorvastatin 10 MG tablet    LIPITOR    90 tablet    TAKE ONE TABLET BY MOUTH ONCE DAILY    CARDIOVASCULAR SCREENING; LDL GOAL LESS THAN 130       blood glucose monitoring lancets     200 each    USE ONE  TO CHECK GLUCOSE THREE TIMES DAILY    Diabetes mellitus, drug-related (H)       blood glucose monitoring test strip    no brand specified    300 each    1 strip by In Vitro route 3 times daily Contour next test strips    Diabetes mellitus, drug-related (H)       fluticasone furoate 100 MCG/ACT Aepb inhalation powder    ARNUITY ELLIPTA    1 each    Inhale 1 puff into the lungs daily    Chronic obstructive pulmonary disease, unspecified COPD type (H)       IMURAN 50 MG tablet   Generic drug:  azaTHIOprine     30 tablet    Take 100 mg by mouth daily        insulin aspart 100 UNIT/ML injection    NovoLOG FLEXPEN    15 mL    Check your blood sugar before meals as directed. Cover with novolog as follows: 151-200 = 2 units  201-250 = 4 units 251-300 = 6 units 301-350 = 8 units >350 = 10 units and recheck blood sugar in one hour.    Type 2 diabetes mellitus with stage 2 chronic kidney disease (H)       metFORMIN 500 MG 24 hr tablet    GLUCOPHAGE-XR    360 tablet    TAKE TWO TABLETS BY MOUTH TWICE DAILY WITH MEALS    Type 2 diabetes mellitus with stage 2 chronic kidney disease (H)       omeprazole 10 MG CR capsule    priLOSEC    90 capsule    Take by mouth 30-60 minutes before a meal.    Gastroesophageal reflux disease without esophagitis       predniSONE 5 MG tablet    DELTASONE     Take 1 tablet by mouth daily        ranitidine 300 MG tablet    ZANTAC    90 tablet    Take 1 tablet  (300 mg) by mouth At Bedtime    Gastroesophageal reflux disease without esophagitis       umeclidinium-vilanterol 62.5-25 MCG/INH oral inhaler    ANORO ELLIPTA     Inhale 1 puff into the lungs daily        vitamin D 1000 UNITS capsule      Take 1 capsule by mouth daily.

## 2018-03-27 NOTE — PROGRESS NOTES
Name: Lauri Mcgee  Seen for f/u of Diabetes.  HPI:  Lauri Mcgee is a 72 year old male who presents for the evaluation/management of DM.  Was seen by  before. Last visit 2015 with her.  Currently taking prednisone 5 mg/day.    1. Type 2 DM /steroid induced.:  Orginally diagnosed at the age of: 67 in July 2013. S/p diagnosis of retroperitoneal fibrosis in 4/13, started on high doses of prednisone with subsequent hyperglycemia.  Current Regimen: Metformin 1500 mg daily. Novolog prn SSI- takes 4 units if > 200.  BS checks: 1-2 per day  Average Meter Download: Patient did not bring glucometer. Without Blood sugar data it is difficult to make adjustment to medical regimen.   Hand written BG log reviewed personally. Mostly BG in range. > 200 only 2 times in last 1 month.  Pt is treated with prednisone 5 mg qd for retroperitoneal fibrosis.  He denies any concerns including cp, sob or any GI/ complaints.    Complications:   Diabetes Complications  Description / Detail    Diabetic Retinopathy  No   CAD / PAD  No   Neuropathy  No, left foot numbness due to prior h/o disc disease   Nephropathy / Microalbuminuria  No   Gastroparesis  No   Hypoglycemia [place]  No     2. Hypertension: Blood Pressure today:   BP Readings from Last 3 Encounters:   03/27/18 118/52   03/27/18 110/60   09/25/17 112/66   .   Takes medications everyday without forgetting a dose.  Denies feeling lightheaded or dizzy.  Wakes up 0-1 times a night to urinate.  3. Hyperlipidemia:   Denies muscle aches of pains.       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     Past Surgical History:   Procedure Laterality Date     C NONSPECIFIC PROCEDURE  1971    surgery for herniated disk     CARDIAC NUC ALEX STRESS TEST NL  7/26/06    normal cardiolyte test      "TONSILLECTOMY       Family Hx:  Family History   Problem Relation Age of Onset     CEREBROVASCULAR DISEASE Father      HEART DISEASE Father      Circulatory Mother      anurism     Type 2 Diabetes Brother      Type 2 Diabetes Sister      Type 2 Diabetes Paternal Grandfather        Social Hx:  Social History     Social History     Marital status: Single     Spouse name: N/A     Number of children: N/A     Years of education: N/A     Occupational History      [employer]     Social History Main Topics     Smoking status: Former Smoker     Packs/day: 0.50     Years: 47.00     Types: Cigarettes     Quit date: 10/1/2013     Smokeless tobacco: Never Used      Comment: occ     Alcohol use 0.0 oz/week     0 Standard drinks or equivalent per week     Drug use: No     Sexual activity: No     Other Topics Concern     Not on file     Social History Narrative          MEDICATIONS:  has a current medication list which includes the following prescription(s): fluticasone furoate, blood glucose monitoring, metformin, ranitidine, omeprazole, atorvastatin, blood glucose monitoring, insulin aspart, umeclidinium-vilanterol, prednisone, aspirin, vitamin d, azathioprine, and albuterol.    ROS     ROS: 10 point ROS neg other than the symptoms noted above in the HPI.    Physical Exam   VS: /52 (BP Location: Right arm, Patient Position: Chair, Cuff Size: Adult Large)  Pulse 63  Temp 97.5  F (36.4  C) (Oral)  Ht 1.892 m (6' 2.5\")  Wt 91.5 kg (201 lb 11.5 oz)  SpO2 95%  BMI 25.55 kg/m2  GENERAL: AXOX3, NAD, well dressed, answering questions appropriately, appears stated age.  HEENT: No exopthalmous, no proptosis, EOMI, no lig lag, no retraction  NEUROLOGY: CN grossly intact, no tremors  PSYCH: normal affect and mood    LABS:  A1c:   Lab Results   Component Value Date    A1C 6.3 03/27/2018    A1C 5.9 09/25/2017    A1C 6.1 03/21/2017    A1C 6.3 08/02/2016    A1C 6.0 04/11/2016       Basic " Metabolic Panel:  Creatinine   Date Value Ref Range Status   01/25/2018 0.930 0.500 - 1.300 mg/dL Final     LFTS/Cholesterol Panel:  Recent Labs   Lab Test  03/21/17   0743  04/11/16   0756  04/07/15   0832  01/26/15   1212   CHOL  120  120  115  134   HDL  51  51  46  55   LDL  53  55  51  60   TRIG  78  68  91  95   CHOLHDLRATIO   --    --   2.5  2.4     Urine MicroAlbumin:  Lab Results   Component Value Date    UMALCR 6.44 09/25/2017        All pertinent notes, labs, and images personally reviewed by me.     A/P  Mr.Kenneth NGUYỄN Mcgee is a 72 year old here for the evaluation/management of diabetes:    1. DM2 - Under Good control. Recent A1c at 6.3%. Taking maintenance dose of 5 mg qd for retroperitoneal fibrosis.  Plan to continue metformin to 1000 mg bid.  I would recommend to continue SSI novolog- if BG > 200 - using seldom but good to have it in case of hyperglycemia as a backup.  Rx sent.    Prevention  ASA-Yes  Smoking- No    Most Recent Immunizations   Administered Date(s) Administered     Influenza (H1N1) 01/15/2010     Influenza (High Dose) 3 valent vaccine 09/19/2017     Influenza (IIV3) PF 09/28/2014     Pneumo Conj 13-V (2010&after) 01/26/2015     Pneumococcal 23 valent 09/01/2012     TD (ADULT, 7+) 04/08/2013     Zoster vaccine, live 10/11/2013     There is some variability among people, most will usually develop symptoms suggestive of hypoglycemia when blood glucose levels are lowered to the mid 60's. The first set of symptoms are called adrenergic. Patients may experience any of the following nervousness, sweating, intense hunger, trembling, weakness, palpitations, and difficulty speaking.   The acute management of hypoglycemia involves the rapid delivery of a source of easily absorbed sugar. Regular soda, juice, lifesavers, table sugar, are good options. 15 grams of glucose is the dose that is given, followed by an assessment of symptoms and a blood glucose check if possible. If after 10 minutes there  is no improvement, another 10-15 grams should be given. This can be repeated up to three times. The equivalency of 10-15 grams of glucose (approximate servings) are: Four lifesavers, 4 teaspoons of sugar, or 1/2 can of regular soda or juice.    All questions were answered.  The patient indicates understanding of the above issues and agrees with the plan set forth.       More than 50% of face to face time spent with Mr. Mcgee on counseling / coordinating his care.  Total face to face time was greater than 15 minutes.      Follow-up:  follow up as needed    Cleo Seymour MD  Endocrinology   [facility]  March 27, 2018  CC: Yenni Sewell (General)

## 2018-03-27 NOTE — PATIENT INSTRUCTIONS
[facility]   Dr Seymour, Endocrinology Department      [facility]   [address]  [place] [address]  Appointment [phone]  Fax: [phone]  [place]: Monday and Tuesday         [facility]   [address]  [place] [address]  Appointment [phone]  Fax: [phone]  [place]: Wednesday and Thursday          Please arrive 30 minutes before your scheduled appointment.   First go to the main floor lab suit  for previsit A1c lab appointment and then come upstairs and get checked in with  for clinic visit.  This will allow for your blood glucose meter/insulin pump to be downloaded and glycosylated hemoglobin (A1c) to be obtained prior to your appointment.

## 2018-03-27 NOTE — PATIENT INSTRUCTIONS
Let's have you meet with Dr. Seymour to discuss novolog.   I don't think you still need this, but I know it's bothersome to you when your sugars are above 200.     Call for an appointment with Dr. Knight to discuss your gallstones and the episode of pain you had a few months ago.    Add arnuity ellipta 1 puff once a day.    OK to try stopping omeprazole.    At your next visit, we should consider starting a medicine to protect kidneys with diabetes. (lisinopril or losartan)

## 2018-03-27 NOTE — LETTER
3/27/2018         RE: Lauri Mcgee  [address]  [place] [address]        Dear Colleague,    Thank you for referring your patient, Lauri Mcgee, to [facility]. Please see a copy of my visit note below.    Name: Lauri Mcgee  Seen for f/u of Diabetes.  HPI:  Lauri Mcgee is a 72 year old male who presents for the evaluation/management of DM.  Was seen by  before. Last visit 2015 with her.  Currently taking prednisone 5 mg/day.    1. Type 2 DM /steroid induced.:  Orginally diagnosed at the age of: 67 in July 2013. S/p diagnosis of retroperitoneal fibrosis in 4/13, started on high doses of prednisone with subsequent hyperglycemia.  Current Regimen: Metformin 1500 mg daily. Novolog prn SSI- takes 4 units if > 200.  BS checks: 1-2 per day  Average Meter Download: Patient did not bring glucometer. Without Blood sugar data it is difficult to make adjustment to medical regimen.   Hand written BG log reviewed personally. Mostly BG in range. > 200 only 2 times in last 1 month.  Pt is treated with prednisone 5 mg qd for retroperitoneal fibrosis.  He denies any concerns including cp, sob or any GI/ complaints.    Complications:   Diabetes Complications  Description / Detail    Diabetic Retinopathy  No   CAD / PAD  No   Neuropathy  No, left foot numbness due to prior h/o disc disease   Nephropathy / Microalbuminuria  No   Gastroparesis  No   Hypoglycemia [place]  No     2. Hypertension: Blood Pressure today:   BP Readings from Last 3 Encounters:   03/27/18 118/52   03/27/18 110/60   09/25/17 112/66   .   Takes medications everyday without forgetting a dose.  Denies feeling lightheaded or dizzy.  Wakes up 0-1 times a night to urinate.  3. Hyperlipidemia:   Denies muscle aches of pains.       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     Past Surgical History:   Procedure Laterality Date     C NONSPECIFIC PROCEDURE  1971    surgery for herniated disk     CARDIAC NUC ALEX STRESS TEST NL  7/26/06    normal cardiolyte test     TONSILLECTOMY       Family Hx:  Family History   Problem Relation Age of Onset     CEREBROVASCULAR DISEASE Father      HEART DISEASE Father      Circulatory Mother      anurism     Type 2 Diabetes Brother      Type 2 Diabetes Sister      Type 2 Diabetes Paternal Grandfather        Social Hx:  Social History     Social History     Marital status: Single     Spouse name: N/A     Number of children: N/A     Years of education: N/A     Occupational History      [employer]     Social History Main Topics     Smoking status: Former Smoker     Packs/day: 0.50     Years: 47.00     Types: Cigarettes     Quit date: 10/1/2013     Smokeless tobacco: Never Used      Comment: occ     Alcohol use 0.0 oz/week     0 Standard drinks or equivalent per week     Drug use: No     Sexual activity: No     Other Topics Concern     Not on file     Social History Narrative          MEDICATIONS:  has a current medication list which includes the following prescription(s): fluticasone furoate, blood glucose monitoring, metformin, ranitidine, omeprazole, atorvastatin, blood glucose monitoring, insulin aspart, umeclidinium-vilanterol, prednisone, aspirin, vitamin d, azathioprine, and albuterol.    ROS     ROS: 10 point ROS neg other than the symptoms noted above in the HPI.    Physical Exam   VS: /52 (BP Location: Right arm, Patient Position: Chair, Cuff Size: Adult Large)  Pulse 63  Temp 97.5  F (36.4  C) (Oral)  Ht 1.892 m (6' 2.5\")  Wt 91.5 kg (201 lb 11.5 oz)  SpO2 95%  BMI 25.55 kg/m2  GENERAL: AXOX3, NAD, well dressed, answering questions appropriately, appears stated age.  HEENT: No exopthalmous, no proptosis, EOMI, no lig lag, no " retraction  NEUROLOGY: CN grossly intact, no tremors  PSYCH: normal affect and mood    LABS:  A1c:   Lab Results   Component Value Date    A1C 6.3 03/27/2018    A1C 5.9 09/25/2017    A1C 6.1 03/21/2017    A1C 6.3 08/02/2016    A1C 6.0 04/11/2016       Basic Metabolic Panel:  Creatinine   Date Value Ref Range Status   01/25/2018 0.930 0.500 - 1.300 mg/dL Final     LFTS/Cholesterol Panel:  Recent Labs   Lab Test  03/21/17   0743  04/11/16   0756  04/07/15   0832  01/26/15   1212   CHOL  120  120  115  134   HDL  51  51  46  55   LDL  53  55  51  60   TRIG  78  68  91  95   CHOLHDLRATIO   --    --   2.5  2.4     Urine MicroAlbumin:  Lab Results   Component Value Date    UMALCR 6.44 09/25/2017        All pertinent notes, labs, and images personally reviewed by me.     A/P  Mr.Kenneth NGUYỄN Mcgee is a 72 year old here for the evaluation/management of diabetes:    1. DM2 - Under Good control. Recent A1c at 6.3%. Taking maintenance dose of 5 mg qd for retroperitoneal fibrosis.  Plan to continue metformin to 1000 mg bid.  I would recommend to continue SSI novolog- if BG > 200 - using seldom but good to have it in case of hyperglycemia as a backup.  Rx sent.    Prevention  ASA-Yes  Smoking- No    Most Recent Immunizations   Administered Date(s) Administered     Influenza (H1N1) 01/15/2010     Influenza (High Dose) 3 valent vaccine 09/19/2017     Influenza (IIV3) PF 09/28/2014     Pneumo Conj 13-V (2010&after) 01/26/2015     Pneumococcal 23 valent 09/01/2012     TD (ADULT, 7+) 04/08/2013     Zoster vaccine, live 10/11/2013     There is some variability among people, most will usually develop symptoms suggestive of hypoglycemia when blood glucose levels are lowered to the mid 60's. The first set of symptoms are called adrenergic. Patients may experience any of the following nervousness, sweating, intense hunger, trembling, weakness, palpitations, and difficulty speaking.   The acute management of hypoglycemia involves the rapid  delivery of a source of easily absorbed sugar. Regular soda, juice, lifesavers, table sugar, are good options. 15 grams of glucose is the dose that is given, followed by an assessment of symptoms and a blood glucose check if possible. If after 10 minutes there is no improvement, another 10-15 grams should be given. This can be repeated up to three times. The equivalency of 10-15 grams of glucose (approximate servings) are: Four lifesavers, 4 teaspoons of sugar, or 1/2 can of regular soda or juice.    All questions were answered.  The patient indicates understanding of the above issues and agrees with the plan set forth.       More than 50% of face to face time spent with Mr. Mcgee on counseling / coordinating his care.  Total face to face time was greater than 15 minutes.      Follow-up:  follow up as needed    Cleo Seymour MD  Endocrinology   [facility]  March 27, 2018  CC: Yenni Sewell (General)    Again, thank you for allowing me to participate in the care of your patient.        Sincerely,        Cleo Seymour MD

## 2018-03-28 ENCOUNTER — TELEPHONE (OUTPATIENT)
Dept: SURGERY | Facility: CLINIC | Age: 73
End: 2018-03-28

## 2018-03-28 NOTE — TELEPHONE ENCOUNTER
Referral received from Yenni Sewell for GB    Attempt #1:    Called patient at home [phone].  No answer - left message for patient to return call to clinic at [phone].

## 2018-04-04 NOTE — TELEPHONE ENCOUNTER
Attempt #2:    Called patient at [phone]  No answer - left message for patient to return call to clinic at [phone].

## 2018-04-05 DIAGNOSIS — Z13.6 CARDIOVASCULAR SCREENING; LDL GOAL LESS THAN 130: ICD-10-CM

## 2018-04-05 NOTE — TELEPHONE ENCOUNTER
"Requested Prescriptions   Pending Prescriptions Disp Refills     atorvastatin (LIPITOR) 10 MG tablet [Pharmacy Med Name: ATORVASTATIN 10MG   TAB]    Last Written Prescription Date:  4/19/2017  Last Fill Quantity: 90,  # refills: 3   Last office visit: 3/27/2018 with prescribing provider:  Yenni Sewell     Future Office Visit:   Next 5 appointments (look out 90 days)     Jun 25, 2018 10:50 AM CDT   Office Visit with Yenni Sewell MD   [facility]    [address] [place] [address]  [address]  [facility] [address]   [phone]                  90 tablet 3     Sig: TAKE ONE TABLET BY MOUTH ONCE DAILY    Statins Protocol Failed    4/5/2018  7:58 AM       Failed - LDL on file in past 12 months    Recent Labs   Lab Test  03/21/17   0743   LDL  53            Passed - No abnormal creatine kinase in past 12 months    No lab results found.            Passed - Recent (12 mo) or future (30 days) visit within the authorizing provider's specialty    Patient had office visit in the last 12 months or has a visit in the next 30 days with authorizing provider or within the authorizing provider's specialty.  See \"Patient Info\" tab in inbasket, or \"Choose Columns\" in Meds & Orders section of the refill encounter.           Passed - Patient is age 18 or older          "

## 2018-04-09 RX ORDER — ATORVASTATIN CALCIUM 10 MG/1
TABLET, FILM COATED ORAL
Qty: 30 TABLET | Refills: 0 | Status: SHIPPED | OUTPATIENT
Start: 2018-04-09 | End: 2018-04-30

## 2018-04-10 DIAGNOSIS — E11.22 TYPE 2 DIABETES MELLITUS WITH STAGE 2 CHRONIC KIDNEY DISEASE, UNSPECIFIED LONG TERM INSULIN USE STATUS: ICD-10-CM

## 2018-04-10 DIAGNOSIS — N18.2 TYPE 2 DIABETES MELLITUS WITH STAGE 2 CHRONIC KIDNEY DISEASE, UNSPECIFIED LONG TERM INSULIN USE STATUS: ICD-10-CM

## 2018-04-10 NOTE — TELEPHONE ENCOUNTER
Pharmacy calling, they are needing an approximate units per day for the Novolog. Patient is there now.   Reviewed 3/27/18 office visit, could not find this information, only that patient uses seldomly.  Due to sliding scale max is 10 units per meal. Gave verbal ok for max 30 units/day.

## 2018-04-13 DIAGNOSIS — N13.30 HYDRONEPHROSIS, LEFT: ICD-10-CM

## 2018-04-13 DIAGNOSIS — K68.2 RETROPERITONEAL FIBROSIS: ICD-10-CM

## 2018-04-13 DIAGNOSIS — Z13.6 CARDIOVASCULAR SCREENING; LDL GOAL LESS THAN 130: ICD-10-CM

## 2018-04-13 LAB
CHOLEST SERPL-MCNC: 135 MG/DL
CREAT SERPL-MCNC: 1.17 MG/DL (ref 0.66–1.25)
GFR SERPL CREATININE-BSD FRML MDRD: 61 ML/MIN/1.7M2
HDLC SERPL-MCNC: 51 MG/DL
LDLC SERPL CALC-MCNC: 64 MG/DL
NONHDLC SERPL-MCNC: 84 MG/DL
TRIGL SERPL-MCNC: 99 MG/DL

## 2018-04-13 PROCEDURE — 82565 ASSAY OF CREATININE: CPT | Performed by: UROLOGY

## 2018-04-13 PROCEDURE — 80061 LIPID PANEL: CPT | Performed by: INTERNAL MEDICINE

## 2018-04-13 PROCEDURE — 36415 COLL VENOUS BLD VENIPUNCTURE: CPT | Performed by: UROLOGY

## 2018-04-13 NOTE — TELEPHONE ENCOUNTER
This was already filled x 1 and patient was to do fasting labs.  He is in lab now and there is no order.  Order placed per refill protocol.  Joyce Gaitan RN  Message handled by Nurse Triage.

## 2018-04-25 ENCOUNTER — TRANSFERRED RECORDS (OUTPATIENT)
Dept: HEALTH INFORMATION MANAGEMENT | Facility: CLINIC | Age: 73
End: 2018-04-25

## 2018-04-25 LAB
ALT SERPL-CCNC: 24 IU/L (ref 5–40)
AST SERPL-CCNC: 23 U/L (ref 5–34)
CREAT SERPL-MCNC: 1.1 MG/DL (ref 0.5–1.3)
GFR SERPL CREATININE-BSD FRML MDRD: 69.5 ML/MIN/1.73M2

## 2018-04-30 ENCOUNTER — OFFICE VISIT (OUTPATIENT)
Dept: SURGERY | Facility: CLINIC | Age: 73
End: 2018-04-30
Payer: COMMERCIAL

## 2018-04-30 VITALS
HEART RATE: 63 BPM | RESPIRATION RATE: 16 BRPM | BODY MASS INDEX: 24.87 KG/M2 | HEIGHT: 75 IN | SYSTOLIC BLOOD PRESSURE: 128 MMHG | WEIGHT: 200 LBS | DIASTOLIC BLOOD PRESSURE: 68 MMHG | OXYGEN SATURATION: 95 %

## 2018-04-30 DIAGNOSIS — K80.20 CALCULUS OF GALLBLADDER WITHOUT CHOLECYSTITIS WITHOUT OBSTRUCTION: Primary | ICD-10-CM

## 2018-04-30 DIAGNOSIS — Z13.6 CARDIOVASCULAR SCREENING; LDL GOAL LESS THAN 130: ICD-10-CM

## 2018-04-30 PROCEDURE — 99203 OFFICE O/P NEW LOW 30 MIN: CPT | Performed by: SURGERY

## 2018-04-30 NOTE — MR AVS SNAPSHOT
After Visit Summary   4/30/2018    Lauri Mcgee    MRN: [id number]           Patient Information     Date Of Birth          1945        Visit Information        Provider Department      4/30/2018 10:00 AM Dipesh Knight MD [facility] General Surgery      Today's Diagnoses     Calculus of gallbladder without cholecystitis without obstruction    -  1       Follow-ups after your visit        Your next 10 appointments already scheduled     Jun 25, 2018 10:50 AM CDT   Office Visit with Yenni Sewell MD   [facility]    [address] [place] [address]  [address]  [facility] [address] [phone]           Bring a current list of meds and any records pertaining to this visit. For Physicals, please bring immunization records and any forms needing to be filled out. Please arrive 10 minutes early to complete paperwork.              Who to contact     If you have questions or need follow up information about today's clinic visit or your schedule please contact [facility] directly at [phone].  Normal or non-critical lab and imaging results will be communicated to you by [employer], letter or phone within 4 business days after the clinic has received the results. If you do not hear from us within 7 days, please contact the clinic through [employer] or phone. If you have a critical or abnormal lab result, we will notify you by phone as soon as possible.  Submit refill requests through [employer] or call your pharmacy and they will forward the refill request to us. Please allow 3 business days for your refill to be completed.          Additional Information About Your Visit        MyChart Information     [employer] gives you secure access to your electronic health record. If you see a primary care provider, you can also send messages to your care team and make appointments. If you have questions, please  "call your primary care clinic.  If you do not have a primary care provider, please call [phone] and they will assist you.        Care EveryWhere ID     This is your Care EveryWhere ID. This could be used by other organizations to access your [facility] medical records  [id number]        Your Vitals Were     Pulse Respirations Height Pulse Oximetry BMI (Body Mass Index)       63 16 6' 3\" (1.905 m) 95% 25 kg/m2        Blood Pressure from Last 3 Encounters:   04/30/18 128/68   03/27/18 118/52   03/27/18 110/60    Weight from Last 3 Encounters:   04/30/18 200 lb (90.7 kg)   03/27/18 201 lb 11.5 oz (91.5 kg)   03/27/18 201 lb 11.2 oz (91.5 kg)              Today, you had the following     No orders found for display       Primary Care Provider Office Phone # Fax #    Yenni Sewell [phone]       [facility] [address] [place] [address]        Equal Access to Services     [facility]: Hadii aad ku hadasho Soomaali, waaxda luqadaha, qaybta kaalmada adeegyada, waxay tyerl haypam garrido . So [facility] [phone].    ATENCIÓN: Si habla español, tiene a tovar disposición servicios gratuitos de asistencia lingüística. Llame al [phone].    We comply with applicable federal civil rights laws and [place] laws. We do not discriminate on the basis of race, color, national origin, age, disability, sex, sexual orientation, or gender identity.            Thank you!     Thank you for choosing [facility]  for your care. Our goal is always to provide you with excellent care. Hearing back from our patients is one way we can continue to improve our services. Please take a few minutes to complete the written survey that you may receive in the mail after your visit with us. Thank you!             Your Updated Medication List - Protect others around you: Learn how to safely use, store and throw away your medicines at www.disposemymeds.org.          This list is accurate " as of 4/30/18 10:22 AM.  Always use your most recent med list.                   Brand Name Dispense Instructions for use Diagnosis    albuterol 90 MCG/ACT inhaler      Inhale 2 puffs into the lungs every 6 hours as needed.        aspirin 81 MG tablet      Take 1 tablet by mouth daily        atorvastatin 10 MG tablet    LIPITOR    30 tablet    TAKE ONE TABLET BY MOUTH ONCE DAILY    CARDIOVASCULAR SCREENING; LDL GOAL LESS THAN 130       blood glucose monitoring lancets     200 each    USE ONE  TO CHECK GLUCOSE THREE TIMES DAILY    Diabetes mellitus, drug-related (H)       blood glucose monitoring test strip    no brand specified    300 each    1 strip by In Vitro route 3 times daily Contour next test strips    Diabetes mellitus, drug-related (H)       fluticasone furoate 100 MCG/ACT Aepb inhalation powder    ARNUITY ELLIPTA    1 each    Inhale 1 puff into the lungs daily    Chronic obstructive pulmonary disease, unspecified COPD type (H)       IMURAN 50 MG tablet   Generic drug:  azaTHIOprine     30 tablet    Take 100 mg by mouth daily        insulin aspart 100 UNIT/ML injection    NovoLOG FLEXPEN    15 mL    Check your blood sugar before meals as directed. Cover with novolog as follows: 201-250 = 4 units 251-300 = 6 units 301-350 = 8 units >350 = 10 units and recheck blood sugar in one hour.    Type 2 diabetes mellitus with stage 2 chronic kidney disease, unspecified long term insulin use status (H)       metFORMIN 500 MG 24 hr tablet    GLUCOPHAGE-XR    360 tablet    TAKE TWO TABLETS BY MOUTH TWICE DAILY WITH MEALS    Type 2 diabetes mellitus with stage 2 chronic kidney disease (H)       omeprazole 10 MG CR capsule    priLOSEC    90 capsule    Take by mouth 30-60 minutes before a meal.    Gastroesophageal reflux disease without esophagitis       predniSONE 5 MG tablet    DELTASONE     Take 1 tablet by mouth daily        ranitidine 300 MG tablet    ZANTAC    90 tablet    Take 1 tablet (300 mg) by mouth At Bedtime     Gastroesophageal reflux disease without esophagitis       umeclidinium-vilanterol 62.5-25 MCG/INH oral inhaler    ANORO ELLIPTA     Inhale 1 puff into the lungs daily        vitamin D 1000 units capsule      Take 1 capsule by mouth daily.

## 2018-04-30 NOTE — TELEPHONE ENCOUNTER
"Requested Prescriptions   Pending Prescriptions Disp Refills     atorvastatin (LIPITOR) 10 MG tablet [Pharmacy Med Name: ATORVASTATIN 10MG   TAB]    Last Written Prescription Date:  4/9/2018  Last Fill Quantity: 30,  # refills: 0   Last office visit: 3/27/2018 with prescribing provider:  Yenni Sewell     Future Office Visit:   Next 5 appointments (look out 90 days)     Jun 25, 2018 10:50 AM CDT   [facility]   [facility]    [address] [place] [address]  [address]  [facility] [address]   [phone]                  30 tablet 0     Sig: TAKE 1 TABLET BY MOUTH ONCE DAILY ,FASTING  OFFICE  VISIT  REQUIRED  BEFORE  MORE  REFILLS    Statins Protocol Passed    4/30/2018  7:29 AM       Passed - LDL on file in past 12 months    Recent Labs   Lab Test  04/13/18   1008   LDL  64            Passed - No abnormal creatine kinase in past 12 months    No lab results found.            Passed - Recent (12 mo) or future (30 days) visit within the authorizing provider's specialty    Patient had office visit in the last 12 months or has a visit in the next 30 days with authorizing provider or within the authorizing provider's specialty.  See \"Patient Info\" tab in inbasket, or \"Choose Columns\" in Meds & Orders section of the refill encounter.           Passed - Patient is age 18 or older          "

## 2018-04-30 NOTE — PROGRESS NOTES
Chief complaint:  Abdominal pain, epigastric    HPI:  This patient is a 72 year old  male who presents with epigastric region abdominal pain a few months ago.  The pain is intermittent.  He has also had fatty liver noted on CT     Past Medical History:   has a past medical history of NONSPECIFIC MEDICAL HISTORY (7/06) and Other specified gastritis without mention of hemorrhage (8/06).    Past Surgical History:  Past Surgical History:   Procedure Laterality Date     C NONSPECIFIC PROCEDURE  1971    surgery for herniated disk     CARDIAC NUC ALEX STRESS TEST NL  7/26/06    normal cardiolyte test     TONSILLECTOMY          Social History:  Social History     Social History     Marital status: Single     Spouse name: N/A     Number of children: N/A     Years of education: N/A     Occupational History      [employer]     Social History Main Topics     Smoking status: Former Smoker     Packs/day: 0.50     Years: 47.00     Types: Cigarettes     Quit date: 10/1/2013     Smokeless tobacco: Never Used      Comment: occ     Alcohol use 0.0 oz/week     0 Standard drinks or equivalent per week     Drug use: No     Sexual activity: No     Other Topics Concern     Not on file     Social History Narrative        Family History:  Family History   Problem Relation Age of Onset     CEREBROVASCULAR DISEASE Father      HEART DISEASE Father      Circulatory Mother      anurism     Type 2 Diabetes Brother      Type 2 Diabetes Sister      Type 2 Diabetes Paternal Grandfather        Review of Systems:  The 10 point Review of Systems is negative other than noted in the HPI and above.    Physical Exam:  General - This is a well developed, well nourished male in no apparent distress.  HEENT - Normocephalic, atraumatic.  NO scleral icterus.  Neck - supple without masses  Lungs - respirations regular and non-labored.    Abdomen:   soft, non-distended with no tenderness noted . no masses  palpated. .  Extremities - warm without edema  Neurologic - nonfocal    Relevant labs:  Liver function panel- normal  WBC- normal  Lipase- normal    Imaging:  CT abdomen: 2015  - 2.3 cm gallstone, retroperitoneal fibrosis    Assessment and Plan:  It is my impression that he has cholelithiasis, COPD, DM, HTN, retroperitoneal fibrosis  I have offered him a Laparoscopic cholecystectomy with cholangiograms.  We have discussed the indication, risks and expected recovery.  Risks discussed included duct/ organ injury, conversion to open surgery with increased recovery, post cholecystectomy syndromes and their treatment.  If he wishes to pursue surgery, I recommended GB US to evaluate ducts pre op as this has not been done.   We also discussed alternatives including dissolution, lithotripsy and low fat diet. Literature was given to review.    Since he has had only one attack, 4 months ago, he would like to continue observation for now.  Should he develop continued symptoms, he will contact us for scheduling surgery.    Dipesh Knight MD  [facility] [place]    Please route or send letter to:  Primary Care Provider (PCP) and Include Progress Note

## 2018-04-30 NOTE — PROGRESS NOTES
HPI      ROS (Review of Systems):     Respiratory: Positive for dyspnea.           Physical Exam

## 2018-04-30 NOTE — LETTER
2018    Re: Lauri Mcgee, : 1945    Chief complaint:  Abdominal pain, epigastric     HPI:  This patient is a 72 year old  male who presents with epigastric region abdominal pain a few months ago.  The pain is intermittent.  He has also had fatty liver noted on CT      Past Medical History:  has a past medical history of NONSPECIFIC MEDICAL HISTORY () and Other specified gastritis without mention of hemorrhage ().     Physical Exam:  General - This is a well developed, well nourished male in no apparent distress.  HEENT - Normocephalic, atraumatic.  NO scleral icterus.  Neck - supple without masses  Lungs - respirations regular and non-labored.    Abdomen: soft, non-distended with no tenderness noted . no masses palpated. .  Extremities - warm without edema  Neurologic - nonfocal     Relevant labs:  Liver function panel- normal  WBC- normal  Lipase- normal     Imaging:  CT abdomen:   - 2.3 cm gallstone, retroperitoneal fibrosis     Assessment and Plan:  It is my impression that he has cholelithiasis, COPD, DM, HTN, retroperitoneal fibrosis  I have offered him a Laparoscopic cholecystectomy with cholangiograms.  We have discussed the indication, risks and expected recovery.  Risks discussed included duct/ organ injury, conversion to open surgery with increased recovery, post cholecystectomy syndromes and their treatment.  If he wishes to pursue surgery, I recommended GB US to evaluate ducts pre op as this has not been done.   We also discussed alternatives including dissolution, lithotripsy and low fat diet. Literature was given to review.     Since he has had only one attack, 4 months ago, he would like to continue observation for now. Should he develop continued symptoms, he will contact us for scheduling surgery.     Dipesh Knight MD  [facility] [place]

## 2018-05-03 RX ORDER — ATORVASTATIN CALCIUM 10 MG/1
10 TABLET, FILM COATED ORAL DAILY
Qty: 30 TABLET | Refills: 5 | Status: SHIPPED | OUTPATIENT
Start: 2018-05-03 | End: 2018-10-22

## 2018-05-03 NOTE — TELEPHONE ENCOUNTER
Prescription approved per [facility] Refill Protocol.    Sivan RUBIO RN, BSN, PHN  Filion Flex RN

## 2018-05-21 DIAGNOSIS — E11.22 TYPE 2 DIABETES MELLITUS WITH STAGE 2 CHRONIC KIDNEY DISEASE (H): ICD-10-CM

## 2018-05-21 DIAGNOSIS — N18.2 TYPE 2 DIABETES MELLITUS WITH STAGE 2 CHRONIC KIDNEY DISEASE (H): ICD-10-CM

## 2018-05-21 NOTE — TELEPHONE ENCOUNTER
Requested Prescriptions   Pending Prescriptions Disp Refills     metFORMIN (GLUCOPHAGE-XR) 500 MG 24 hr tablet [Pharmacy Med Name: MetFORMIN ER 500MG  TAB]    Last Written Prescription Date:  2/22/2018  Last Fill Quantity: 360,  # refills: 0   Last office visit: 3/27/2018 with prescribing provider:  Yenni Sewell     Future Office Visit:   Next 5 appointments (look out 90 days)     Jun 25, 2018 10:50 AM CDT   Office Visit with Yenni Sewell MD   [facility] ([facility])    [address] [place] [address]  [address]  [facility] [address]   [phone]                  360 tablet 0     Sig: TAKE 2 TABLETS BY MOUTH TWICE DAILY WITH MEALS    Biguanide Agents Passed    5/21/2018  8:01 AM       Passed - Blood pressure less than 140/90 in past 6 months    BP Readings from Last 3 Encounters:   04/30/18 128/68   03/27/18 118/52   03/27/18 110/60                Passed - Patient has documented LDL within the past 12 mos.    Recent Labs   Lab Test  04/13/18   1008   LDL  64            Passed - Patient has had a Microalbumin in the past 12 mos.    Recent Labs   Lab Test  09/25/17   0821   MICROL  13   UMALCR  6.44            Passed - Patient is age 10 or older       Passed - Patient has documented A1c within the specified period of time.    If HgbA1C is 8 or greater, it needs to be on file within the past 3 months.  If less than 8, must be on file within the past 6 months.     Recent Labs   Lab Test  03/27/18   1047   A1C  6.3*            Passed - Patient's CR is NOT>1.4 OR Patient's EGFR is NOT<45 within past 12 mos.    Recent Labs   Lab Test 04/25/18 04/13/18   1002   GFRESTIMATED  69.5  61   GFRESTBLACK   --   74       Recent Labs   Lab Test 04/25/18   CR  1.100            Passed - Patient does NOT have a diagnosis of CHF.       Passed - Recent (6 mo) or future (30 days) visit within the authorizing provider's specialty    Patient had office visit in the last 6 months or has a visit in the  "next 30 days with authorizing provider or within the authorizing provider's specialty.  See \"Patient Info\" tab in inbasket, or \"Choose Columns\" in Meds & Orders section of the refill encounter.              "

## 2018-05-23 RX ORDER — METFORMIN HCL 500 MG
TABLET, EXTENDED RELEASE 24 HR ORAL
Qty: 360 TABLET | Refills: 0 | Status: SHIPPED | OUTPATIENT
Start: 2018-05-23 | End: 2018-08-24

## 2018-05-23 NOTE — TELEPHONE ENCOUNTER
Prescription approved per [facility] Refill Protocol.  Nichole Lewis RN

## 2018-06-25 ENCOUNTER — OFFICE VISIT (OUTPATIENT)
Dept: PEDIATRICS | Facility: CLINIC | Age: 73
End: 2018-06-25
Payer: COMMERCIAL

## 2018-06-25 VITALS
DIASTOLIC BLOOD PRESSURE: 62 MMHG | HEIGHT: 75 IN | HEART RATE: 68 BPM | BODY MASS INDEX: 24.67 KG/M2 | TEMPERATURE: 98 F | SYSTOLIC BLOOD PRESSURE: 128 MMHG | OXYGEN SATURATION: 96 % | WEIGHT: 198.4 LBS

## 2018-06-25 DIAGNOSIS — N18.1 TYPE 2 DIABETES MELLITUS WITH STAGE 1 CHRONIC KIDNEY DISEASE, WITH LONG-TERM CURRENT USE OF INSULIN (H): Primary | ICD-10-CM

## 2018-06-25 DIAGNOSIS — Z79.4 TYPE 2 DIABETES MELLITUS WITH STAGE 1 CHRONIC KIDNEY DISEASE, WITH LONG-TERM CURRENT USE OF INSULIN (H): Primary | ICD-10-CM

## 2018-06-25 DIAGNOSIS — J44.9 CHRONIC OBSTRUCTIVE PULMONARY DISEASE, UNSPECIFIED COPD TYPE (H): ICD-10-CM

## 2018-06-25 DIAGNOSIS — Z87.891 PERSONAL HISTORY OF TOBACCO USE: ICD-10-CM

## 2018-06-25 DIAGNOSIS — E11.22 TYPE 2 DIABETES MELLITUS WITH STAGE 1 CHRONIC KIDNEY DISEASE, WITH LONG-TERM CURRENT USE OF INSULIN (H): Primary | ICD-10-CM

## 2018-06-25 PROCEDURE — G0296 VISIT TO DETERM LDCT ELIG: HCPCS | Performed by: INTERNAL MEDICINE

## 2018-06-25 PROCEDURE — 99214 OFFICE O/P EST MOD 30 MIN: CPT | Performed by: INTERNAL MEDICINE

## 2018-06-25 RX ORDER — LISINOPRIL 2.5 MG/1
2.5 TABLET ORAL DAILY
Qty: 30 TABLET | Refills: 1 | Status: SHIPPED | OUTPATIENT
Start: 2018-06-25 | End: 2018-08-10

## 2018-06-25 NOTE — PATIENT INSTRUCTIONS
Add lisinopril 2.5 mg once daily in the morning.  In one month, you can call [phone] for a non-fasting lab appointment at your next convenience, or make an appointment online.

## 2018-06-25 NOTE — PROGRESS NOTES
SUBJECTIVE:   Lauri Mcgee is a 72 year old male who presents to clinic today for the following health issues:      Diabetes Follow-up    Patient is checking blood sugars: once daily.  Results are as follows:         am - 90         lunchtime - 120         suppertime - 110's    Only one over 200    Diabetic concerns: None     Symptoms of hypoglycemia (low blood sugar): 1 time     Paresthesias (numbness or burning in feet) or sores: No     Date of last diabetic eye exam: 11/27/17    Hyperlipidemia Follow-Up      Rate your low fat/cholesterol diet?: fair    Taking statin?  Yes, no muscle aches from statin    Other lipid medications/supplements?:  Fish oil/Omega 3, dose  without side effects      Outpatient blood pressures are not being checked.    Low Salt Diet: no added salt    BP Readings from Last 2 Encounters:   06/25/18 128/62   04/30/18 128/68     Hemoglobin A1C (%)   Date Value   03/27/2018 6.3 (H)   09/25/2017 5.9     LDL Cholesterol Calculated (mg/dL)   Date Value   04/13/2018 64   03/21/2017 53       Amount of exercise or physical activity: 2 days/week for an average of greater than 60 minutes    Problems taking medications regularly: No    Medication side effects: none    Diet: portion control        COPD Follow-Up    Symptoms are currently: improved    Current fatigue or dyspnea with ambulation: none    Shortness of breath: improved    Increased or change in Cough/Sputum: No    Fever(s): No    Baseline ambulation without stopping to rest:  20-30 minutes. Able to walk up 2 flights of stairs without stopping to rest.    Any ER/UC or hospital admissions since your last visit? No     History   Smoking Status     Former Smoker     Packs/day: 0.50     Years: 47.00     Types: Cigarettes     Quit date: 10/1/2013   Smokeless Tobacco     Never Used     Comment: occ     No results found for: FEV1, YZA3FFO    Problem list and histories reviewed & adjusted, as indicated.  Additional history: as  documented    Patient Active Problem List   Diagnosis     Retroperitoneal fibrosis     Gastroesophageal reflux disease without esophagitis     CARDIOVASCULAR SCREENING; LDL GOAL LESS THAN 130     Advanced directives, counseling/discussion     Tubular adenoma of colon     COPD (chronic obstructive pulmonary disease) (H)     Type 2 diabetes mellitus with stage 1 chronic kidney disease, with long-term current use of insulin (H)     Past Surgical History:   Procedure Laterality Date     C NONSPECIFIC PROCEDURE  1971    surgery for herniated disk     CARDIAC NUC ALEX STRESS TEST NL  7/26/06    normal cardiolyte test     TONSILLECTOMY         Social History   Substance Use Topics     Smoking status: Former Smoker     Packs/day: 0.50     Years: 47.00     Types: Cigarettes     Quit date: 10/1/2013     Smokeless tobacco: Never Used      Comment: occ     Alcohol use No     Family History   Problem Relation Age of Onset     Cerebrovascular Disease Father      HEART DISEASE Father      Coronary Artery Disease Father      Circulatory Mother      anurism     Type 2 Diabetes Brother      Diabetes Brother      Colon Cancer Brother      Substance Abuse Brother      Type 2 Diabetes Sister      Diabetes Sister      Type 2 Diabetes Paternal Grandfather      Diabetes Paternal Grandfather      Cerebrovascular Disease Paternal Grandfather      Hypertension Maternal Grandmother      Coronary Artery Disease Maternal Grandfather          Lung Cancer Screening Shared Decision Making Visit     Lauri Mcgee is not eligible for lung cancer screening on the basis of:   has not not experienced symptoms suggestive of lung cancer.   born on 1945, 72 year old years old.   smoked 1/2 packs of cigarettes for 45 years for a total of 22.5 pack-years   has quit smoking 4 1/2 years ago      I have discussed with patient the risks and benefits of screening for lung cancer with low-dose CT.     The risks include:   radiation exposure    false positives   "   over-diagnosis    The benefit of early detection of lung cancer is contingent upon adherence to annual screening or more frequent follow up if indicated.     Furthermore, reaping the benefits of screening requires Lauri Mcgee to be willing and able to undergo diagnostic procedures, if indicated.     The benefit of early detection of lung cancer is contingent upon adherence to annual screening or more frequent follow up if indicated.     Furthermore, reaping the benefits of screening requires Lauri Mcgee to be willing and physically able to undergo diagnostic procedures, if indicated. Although no specific guide is available for determining severity of comorbidities, it is reasonable to withhold screening in patients who have greater mortality risk from other diseases.     I did offer risk estimation using a calculator such as this one:    ShouldIScreen    Reviewed and updated as needed this visit by clinical staff  Tobacco  Allergies  Meds  Med Hx  Surg Hx  Fam Hx  Soc Hx      Reviewed and updated as needed this visit by Provider         ROS:  Constitutional, HEENT, cardiovascular, pulmonary, GI, , musculoskeletal, neuro, skin, endocrine and psych systems are negative, except as otherwise noted.    OBJECTIVE:     /62 (Cuff Size: Adult Regular)  Pulse 68  Temp 98  F (36.7  C)  Ht 6' 3\" (1.905 m)  Wt 198 lb 6.4 oz (90 kg)  SpO2 96%  BMI 24.8 kg/m2  Body mass index is 24.8 kg/(m^2).  GENERAL: healthy, alert and no distress  NECK: no adenopathy, no asymmetry, masses, or scars and thyroid normal to palpation  RESP: lungs clear to auscultation - no rales, rhonchi or wheezes  CV: regular rate and rhythm, normal S1 S2, no S3 or S4, no murmur, click or rub, no peripheral edema and peripheral pulses strong  ABDOMEN: soft, nontender, no hepatosplenomegaly, no masses and bowel sounds normal  MS: no gross musculoskeletal defects noted, no edema    Diagnostic Test Results:  Results for orders placed or " performed in visit on 04/25/18   ALT   Result Value Ref Range    ALT 24 5 - 40 IU/L    Narrative    LAB RESULT [facility]   AST   Result Value Ref Range    AST 23 5 - 34 U/L    Narrative    LAB RESULT [facility]   Creatinine   Result Value Ref Range    Creatinine 1.100 0.500 - 1.300 mg/dL    GFR Estimate 69.5 mL/min/1.73m2    Narrative    LAB RESULT [facility]       ASSESSMENT/PLAN:     1. Type 2 diabetes mellitus with stage 1 chronic kidney disease, with long-term current use of insulin (H)  Add ACEI  - lisinopril (PRINIVIL/ZESTRIL) 2.5 MG tablet; Take 1 tablet (2.5 mg) by mouth daily  Dispense: 30 tablet; Refill: 1  - **Basic metabolic panel FUTURE anytime; Future  - **A1C FUTURE anytime; Future    2. Personal history of tobacco use  Reviewed option for low dose CT screening for lung cancer. He does not meet criteria for screening    3. Chronic obstructive pulmonary disease, unspecified COPD type (H)  Much improved after adding arnuity. Continue current regimen.  - fluticasone furoate (ARNUITY ELLIPTA) 100 MCG/ACT AEPB inhalation powder; Inhale 1 puff into the lungs daily  Dispense: 3 each; Refill: 11    Patient Instructions   Add lisinopril 2.5 mg once daily in the morning.  In one month, you can call [phone] for a non-fasting lab appointment at your next convenience, or make an appointment online.          Yenni Sewell MD  [facility]

## 2018-06-25 NOTE — MR AVS SNAPSHOT
After Visit Summary   6/25/2018    Lauri Mcgee    MRN: [id number]           Patient Information     Date Of Birth          1945        Visit Information        Provider Department      6/25/2018 10:50 AM Yenni Sewell MD [facility]        Today's Diagnoses     Type 2 diabetes mellitus with stage 1 chronic kidney disease, with long-term current use of insulin (H)    -  1    Personal history of tobacco use        Chronic obstructive pulmonary disease, unspecified COPD type (H)          Care Instructions    Add lisinopril 2.5 mg once daily in the morning.  In one month, you can call [phone] for a non-fasting lab appointment at your next convenience, or make an appointment online.          Follow-ups after your visit        Follow-up notes from your care team     Return in about 4 months (around 10/25/2018) for Lab Work, Follow Up Visit.      Future tests that were ordered for you today     Open Future Orders        Priority Expected Expires Ordered    **A1C FUTURE anytime Routine 6/25/2018 6/25/2019 6/25/2018    CT Chest Lung Cancer Scrn Low Dose wo Routine  6/25/2019 6/25/2018    **Basic metabolic panel FUTURE anytime Routine 6/25/2018 6/25/2019 6/25/2018            Who to contact     If you have questions or need follow up information about today's clinic visit or your schedule please contact [facility] directly at [phone].  Normal or non-critical lab and imaging results will be communicated to you by MyChart, letter or phone within 4 business days after the clinic has received the results. If you do not hear from us within 7 days, please contact the clinic through MyChart or phone. If you have a critical or abnormal lab result, we will notify you by phone as soon as possible.  Submit refill requests through [employer] or call your pharmacy and they will forward the refill request to us. Please allow 3 business days for your refill to be completed.           "Additional Information About Your Visit        [employer] Information     [employer] gives you secure access to your electronic health record. If you see a primary care provider, you can also send messages to your care team and make appointments. If you have questions, please call your primary care clinic.  If you do not have a primary care provider, please call [phone] and they will assist you.        Care EveryWhere ID     This is your Care EveryWhere ID. This could be used by other organizations to access your [facility] medical records  [id number]        Your Vitals Were     Pulse Temperature Height Pulse Oximetry BMI (Body Mass Index)       68 98  F (36.7  C) 6' 3\" (1.905 m) 96% 24.8 kg/m2        Blood Pressure from Last 3 Encounters:   06/25/18 128/62   04/30/18 128/68   03/27/18 118/52    Weight from Last 3 Encounters:   06/25/18 198 lb 6.4 oz (90 kg)   04/30/18 200 lb (90.7 kg)   03/27/18 201 lb 11.5 oz (91.5 kg)              We Performed the Following     Prof Fee: Shared Decision Making Visit for Lung Cancer Screening          Today's Medication Changes          These changes are accurate as of 6/25/18 11:48 AM.  If you have any questions, ask your nurse or doctor.               Start taking these medicines.        Dose/Directions    lisinopril 2.5 MG tablet   Commonly known as:  PRINIVIL/Zestril   Used for:  Type 2 diabetes mellitus with stage 1 chronic kidney disease, with long-term current use of insulin (H)   Started by:  Yenni Sewell MD        Dose:  2.5 mg   Take 1 tablet (2.5 mg) by mouth daily   Quantity:  30 tablet   Refills:  1            Where to get your medicines      These medications were sent to [facility] [id number] [facility] [place] - [address]  [address] [facility] [address] [facility] [address]     Phone:  [phone]     fluticasone furoate 100 MCG/ACT Aepb inhalation powder    lisinopril 2.5 MG tablet                Primary Care Provider Office Phone # Fax #    Yenni Sewell MD " [phone] [address] [place] [address]        Equal Access to Services     NATI MEZA : Hadii aad ku hadranxiomy Jazmine, waslavada luqadaha, qaybta kaalmada sherinekalani, isa ngocin hayaanancy basurtojaime emmaanthony radhika carvajal. So [facility] [phone].    ATENCIÓN: Si habla español, tiene a tovar disposición servicios gratuitos de asistencia lingüística. Llame al [phone].    We comply with applicable federal civil rights laws and [place] laws. We do not discriminate on the basis of race, color, national origin, age, disability, sex, sexual orientation, or gender identity.            Thank you!     Thank you for choosing [facility]  for your care. Our goal is always to provide you with excellent care. Hearing back from our patients is one way we can continue to improve our services. Please take a few minutes to complete the written survey that you may receive in the mail after your visit with us. Thank you!             Your Updated Medication List - Protect others around you: Learn how to safely use, store and throw away your medicines at www.disposemymeds.org.          This list is accurate as of 6/25/18 11:48 AM.  Always use your most recent med list.                   Brand Name Dispense Instructions for use Diagnosis    albuterol 90 MCG/ACT inhaler      Inhale 2 puffs into the lungs every 6 hours as needed.        aspirin 81 MG tablet      Take 1 tablet by mouth daily        atorvastatin 10 MG tablet    LIPITOR    30 tablet    Take 1 tablet (10 mg) by mouth daily    CARDIOVASCULAR SCREENING; LDL GOAL LESS THAN 130       blood glucose monitoring lancets     200 each    USE ONE  TO CHECK GLUCOSE THREE TIMES DAILY    Diabetes mellitus, drug-related (H)       blood glucose monitoring test strip    no brand specified    300 each    1 strip by In Vitro route 3 times daily Contour next test strips    Diabetes mellitus, drug-related (H)       fluticasone furoate 100 MCG/ACT Aepb inhalation  powder    ARNUITY ELLIPTA    3 each    Inhale 1 puff into the lungs daily    Chronic obstructive pulmonary disease, unspecified COPD type (H)       IMURAN 50 MG tablet   Generic drug:  azaTHIOprine     30 tablet    Take 100 mg by mouth daily        insulin aspart 100 UNIT/ML injection    NovoLOG FLEXPEN    15 mL    Check your blood sugar before meals as directed. Cover with novolog as follows: 201-250 = 4 units 251-300 = 6 units 301-350 = 8 units >350 = 10 units and recheck blood sugar in one hour.    Type 2 diabetes mellitus with stage 2 chronic kidney disease, unspecified long term insulin use status (H)       lisinopril 2.5 MG tablet    PRINIVIL/Zestril    30 tablet    Take 1 tablet (2.5 mg) by mouth daily    Type 2 diabetes mellitus with stage 1 chronic kidney disease, with long-term current use of insulin (H)       metFORMIN 500 MG 24 hr tablet    GLUCOPHAGE-XR    360 tablet    TAKE 2 TABLETS BY MOUTH TWICE DAILY WITH MEALS    Type 2 diabetes mellitus with stage 2 chronic kidney disease (H)       predniSONE 5 MG tablet    DELTASONE     Take 1 tablet by mouth daily        ranitidine 300 MG tablet    ZANTAC    90 tablet    Take 1 tablet (300 mg) by mouth At Bedtime    Gastroesophageal reflux disease without esophagitis       umeclidinium-vilanterol 62.5-25 MCG/INH oral inhaler    ANORO ELLIPTA     Inhale 1 puff into the lungs daily        vitamin D 1000 units capsule      Take 1 capsule by mouth daily.

## 2018-07-23 DIAGNOSIS — Z79.4 TYPE 2 DIABETES MELLITUS WITH STAGE 1 CHRONIC KIDNEY DISEASE, WITH LONG-TERM CURRENT USE OF INSULIN (H): ICD-10-CM

## 2018-07-23 DIAGNOSIS — N18.1 TYPE 2 DIABETES MELLITUS WITH STAGE 1 CHRONIC KIDNEY DISEASE, WITH LONG-TERM CURRENT USE OF INSULIN (H): ICD-10-CM

## 2018-07-23 DIAGNOSIS — E11.22 TYPE 2 DIABETES MELLITUS WITH STAGE 1 CHRONIC KIDNEY DISEASE, WITH LONG-TERM CURRENT USE OF INSULIN (H): ICD-10-CM

## 2018-07-23 LAB
ANION GAP SERPL CALCULATED.3IONS-SCNC: 5 MMOL/L (ref 3–14)
BUN SERPL-MCNC: 19 MG/DL (ref 7–30)
CALCIUM SERPL-MCNC: 8.9 MG/DL (ref 8.5–10.1)
CHLORIDE SERPL-SCNC: 106 MMOL/L (ref 94–109)
CO2 SERPL-SCNC: 28 MMOL/L (ref 20–32)
CREAT SERPL-MCNC: 0.99 MG/DL (ref 0.66–1.25)
GFR SERPL CREATININE-BSD FRML MDRD: 74 ML/MIN/1.7M2
GLUCOSE SERPL-MCNC: 107 MG/DL (ref 70–99)
HBA1C MFR BLD: 5.6 % (ref 0–5.6)
POTASSIUM SERPL-SCNC: 4 MMOL/L (ref 3.4–5.3)
SODIUM SERPL-SCNC: 139 MMOL/L (ref 133–144)

## 2018-07-23 PROCEDURE — 80048 BASIC METABOLIC PNL TOTAL CA: CPT | Performed by: INTERNAL MEDICINE

## 2018-07-23 PROCEDURE — 83036 HEMOGLOBIN GLYCOSYLATED A1C: CPT | Performed by: INTERNAL MEDICINE

## 2018-07-23 PROCEDURE — 36415 COLL VENOUS BLD VENIPUNCTURE: CPT | Performed by: INTERNAL MEDICINE

## 2018-07-25 ENCOUNTER — TRANSFERRED RECORDS (OUTPATIENT)
Dept: HEALTH INFORMATION MANAGEMENT | Facility: CLINIC | Age: 73
End: 2018-07-25

## 2018-07-25 LAB
ALT SERPL-CCNC: 21 IU/L (ref 5–40)
AST SERPL-CCNC: 24 U/L (ref 5–34)
CREAT SERPL-MCNC: 0.99 MG/DL (ref 0.5–1.3)
GFR SERPL CREATININE-BSD FRML MDRD: 78.5 ML/MIN/1.73M2

## 2018-08-10 DIAGNOSIS — E11.22 TYPE 2 DIABETES MELLITUS WITH STAGE 1 CHRONIC KIDNEY DISEASE, WITH LONG-TERM CURRENT USE OF INSULIN (H): ICD-10-CM

## 2018-08-10 DIAGNOSIS — N18.1 TYPE 2 DIABETES MELLITUS WITH STAGE 1 CHRONIC KIDNEY DISEASE, WITH LONG-TERM CURRENT USE OF INSULIN (H): ICD-10-CM

## 2018-08-10 DIAGNOSIS — Z79.4 TYPE 2 DIABETES MELLITUS WITH STAGE 1 CHRONIC KIDNEY DISEASE, WITH LONG-TERM CURRENT USE OF INSULIN (H): ICD-10-CM

## 2018-08-10 NOTE — TELEPHONE ENCOUNTER
" Pharmacy comment: At request of insurance, please prescribe 90 day rx.    Requested Prescriptions   Pending Prescriptions Disp Refills     lisinopril (PRINIVIL/ZESTRIL) 2.5 MG tablet  Last Written Prescription Date:  06/25/2018  Last Fill Quantity: 30 tablet,  # refills: 1   Last office visit: 6/25/2018 with prescribing provider:  Yenni Sewell MD   Future Office Visit:   Next 5 appointments (look out 90 days)     Oct 22, 2018  9:10 AM CDT   Office Visit with Yenni Sewell MD   [facility]    [address] [place] [address]  [address]  [facility] [address]   [phone]                  30 tablet 1     Sig: Take 1 tablet (2.5 mg) by mouth daily    ACE Inhibitors (Including Combos) Protocol Passed    8/10/2018  4:34 PM       Passed - Blood pressure under 140/90 in past 12 months    BP Readings from Last 3 Encounters:   06/25/18 128/62   04/30/18 128/68   03/27/18 118/52                Passed - Recent (12 mo) or future (30 days) visit within the authorizing provider's specialty    Patient had office visit in the last 12 months or has a visit in the next 30 days with authorizing provider or within the authorizing provider's specialty.  See \"Patient Info\" tab in inbasket, or \"Choose Columns\" in Meds & Orders section of the refill encounter.           Passed - Patient is age 18 or older       Passed - Normal serum creatinine on file in past 12 months    Recent Labs   Lab Test  07/23/18   0905   CR  0.99            Passed - Normal serum potassium on file in past 12 months    Recent Labs   Lab Test  07/23/18   0905   POTASSIUM  4.0               "

## 2018-08-13 DIAGNOSIS — Z13.6 CARDIOVASCULAR SCREENING; LDL GOAL LESS THAN 130: ICD-10-CM

## 2018-08-13 NOTE — TELEPHONE ENCOUNTER
"Requested Prescriptions   Pending Prescriptions Disp Refills     atorvastatin (LIPITOR) 10 MG tablet 30 tablet 5    Last Written Prescription Date:  5/3/18  Last Fill Quantity: 30,  # refills: 5   Last office visit: 6/25/2018 with prescribing provider:  Melodie   Future Office Visit:   Next 5 appointments (look out 90 days)     Oct 22, 2018  9:10 AM CDT   Office Visit with Yenni Sewell MD   [facility]    [address] [place] [address]  [address]  [facility] [address] [phone]                  Sig: Take 1 tablet (10 mg) by mouth daily    Statins Protocol Passed    8/13/2018  3:07 PM       Passed - LDL on file in past 12 months    Recent Labs   Lab Test  04/13/18   1008   LDL  64            Passed - No abnormal creatine kinase in past 12 months    No lab results found.            Passed - Recent (12 mo) or future (30 days) visit within the authorizing provider's specialty    Patient had office visit in the last 12 months or has a visit in the next 30 days with authorizing provider or within the authorizing provider's specialty.  See \"Patient Info\" tab in inbasket, or \"Choose Columns\" in Meds & Orders section of the refill encounter.           Passed - Patient is age 18 or older          "

## 2018-08-14 RX ORDER — ATORVASTATIN CALCIUM 10 MG/1
10 TABLET, FILM COATED ORAL DAILY
Qty: 30 TABLET | Refills: 5 | OUTPATIENT
Start: 2018-08-14

## 2018-08-14 RX ORDER — LISINOPRIL 2.5 MG/1
2.5 TABLET ORAL DAILY
Qty: 90 TABLET | Refills: 0 | Status: SHIPPED | OUTPATIENT
Start: 2018-08-14 | End: 2018-10-22

## 2018-08-14 NOTE — TELEPHONE ENCOUNTER
Prescription approved per [facility], [facility] or [facility] refill protocol.  Yaneth Velasquez RN - Triage  [facility]

## 2018-08-24 DIAGNOSIS — E11.22 TYPE 2 DIABETES MELLITUS WITH STAGE 2 CHRONIC KIDNEY DISEASE (H): ICD-10-CM

## 2018-08-24 DIAGNOSIS — N18.2 TYPE 2 DIABETES MELLITUS WITH STAGE 2 CHRONIC KIDNEY DISEASE (H): ICD-10-CM

## 2018-08-24 NOTE — TELEPHONE ENCOUNTER
Requested Prescriptions   Pending Prescriptions Disp Refills     metFORMIN (GLUCOPHAGE-XR) 500 MG 24 hr tablet [Pharmacy Med Name: MetFORMIN ER 500MG  TAB]    Last Written Prescription Date:  5/23/2018  Last Fill Quantity: 360,  # refills: 0   Last office visit: 6/25/2018 with prescribing provider:  Yenni Sewell     Future Office Visit:   Next 5 appointments (look out 90 days)     Oct 22, 2018  9:10 AM CDT   [facility]    [address] [place] [address]  [address]  [facility] [address]   [phone]                  360 tablet 0     Sig: TAKE 2 TABLETS BY MOUTH TWICE DAILY WITH MEALS    Biguanide Agents Passed    8/24/2018  8:33 AM       Passed - Blood pressure less than 140/90 in past 6 months    BP Readings from Last 3 Encounters:   06/25/18 128/62   04/30/18 128/68   03/27/18 118/52                Passed - Patient has documented LDL within the past 12 mos.    Recent Labs   Lab Test  04/13/18   1008   LDL  64            Passed - Patient has had a Microalbumin in the past 12 mos.    Recent Labs   Lab Test  09/25/17   0821   MICROL  13   UMALCR  6.44            Passed - Patient is age 10 or older       Passed - Patient has documented A1c within the specified period of time.    If HgbA1C is 8 or greater, it needs to be on file within the past 3 months.  If less than 8, must be on file within the past 6 months.     Recent Labs   Lab Test  07/23/18   0905   A1C  5.6            Passed - Patient's CR is NOT>1.4 OR Patient's EGFR is NOT<45 within past 12 mos.    Recent Labs   Lab Test 07/25/18 07/23/18   0905   GFRESTIMATED  78.5  74   GFRESTBLACK   --   90       Recent Labs   Lab Test 07/25/18   CR  0.990            Passed - Patient does NOT have a diagnosis of CHF.       Passed - Recent (6 mo) or future (30 days) visit within the authorizing provider's specialty    Patient had office visit in the last 6 months or has a visit in the  "next 30 days with authorizing provider or within the authorizing provider's specialty.  See \"Patient Info\" tab in inbasket, or \"Choose Columns\" in Meds & Orders section of the refill encounter.              "

## 2018-08-27 RX ORDER — METFORMIN HCL 500 MG
TABLET, EXTENDED RELEASE 24 HR ORAL
Qty: 360 TABLET | Refills: 0 | Status: SHIPPED | OUTPATIENT
Start: 2018-08-27 | End: 2018-10-22

## 2018-08-27 NOTE — TELEPHONE ENCOUNTER
Prescription approved per [facility] Refill Protocol.    Sivan RUBIO RN, BSN, PHN  [facility] RN

## 2018-09-17 DIAGNOSIS — E09.9 DIABETES MELLITUS, DRUG-RELATED (H): ICD-10-CM

## 2018-09-17 NOTE — TELEPHONE ENCOUNTER
"Requested Prescriptions   Pending Prescriptions Disp Refills     CONTOUR NEXT TEST test strip [Pharmacy Med Name: CONTOUR NEXT        ISABEL]    Last Written Prescription Date:  1/23/2017  Last Fill Quantity: 300,  # refills: 3   Last office visit: 6/25/2018 with prescribing provider:  Yenni Sewell     Future Office Visit:   Next 5 appointments (look out 90 days)     Oct 22, 2018  9:10 AM CDT   [facility] Visit with [facility]ell [facility]   [facility]    [address] [place] [id number]   [phone]                  300 strip 3     Sig: USE ONE STRIP TO CHECK GLUCOSE THREE TIMES DAILY BY  IN  VITRO  ROUTE    Diabetic Supplies Protocol Passed    9/17/2018  8:53 AM       Passed - Patient is 18 years of age or older       Passed - Recent (6 mo) or future (30 days) visit within the authorizing provider's specialty    Patient had office visit in the last 6 months or has a visit in the next 30 days with authorizing provider.  See \"Patient Info\" tab in inbasket, or \"Choose Columns\" in Meds & Orders section of the refill encounter.              "

## 2018-10-09 DIAGNOSIS — N13.30 HYDRONEPHROSIS: Primary | ICD-10-CM

## 2018-10-09 PROCEDURE — 82565 ASSAY OF CREATININE: CPT | Performed by: UROLOGY

## 2018-10-09 PROCEDURE — 36415 COLL VENOUS BLD VENIPUNCTURE: CPT | Performed by: UROLOGY

## 2018-10-10 LAB
CREAT SERPL-MCNC: 1.06 MG/DL (ref 0.66–1.25)
GFR SERPL CREATININE-BSD FRML MDRD: 68 ML/MIN/1.7M2

## 2018-10-22 ENCOUNTER — OFFICE VISIT (OUTPATIENT)
Dept: PEDIATRICS | Facility: CLINIC | Age: 73
End: 2018-10-22
Payer: COMMERCIAL

## 2018-10-22 VITALS
SYSTOLIC BLOOD PRESSURE: 110 MMHG | WEIGHT: 198.5 LBS | HEIGHT: 75 IN | DIASTOLIC BLOOD PRESSURE: 62 MMHG | HEART RATE: 61 BPM | BODY MASS INDEX: 24.68 KG/M2 | OXYGEN SATURATION: 96 % | TEMPERATURE: 98.8 F

## 2018-10-22 DIAGNOSIS — Z12.5 SCREENING FOR PROSTATE CANCER: ICD-10-CM

## 2018-10-22 DIAGNOSIS — J44.9 CHRONIC OBSTRUCTIVE PULMONARY DISEASE, UNSPECIFIED COPD TYPE (H): ICD-10-CM

## 2018-10-22 DIAGNOSIS — K68.2 RETROPERITONEAL FIBROSIS: ICD-10-CM

## 2018-10-22 DIAGNOSIS — Z13.6 CARDIOVASCULAR SCREENING; LDL GOAL LESS THAN 130: ICD-10-CM

## 2018-10-22 DIAGNOSIS — E11.22 TYPE 2 DIABETES MELLITUS WITH STAGE 2 CHRONIC KIDNEY DISEASE, WITH LONG-TERM CURRENT USE OF INSULIN (H): Primary | ICD-10-CM

## 2018-10-22 DIAGNOSIS — Z79.4 TYPE 2 DIABETES MELLITUS WITH STAGE 2 CHRONIC KIDNEY DISEASE, WITH LONG-TERM CURRENT USE OF INSULIN (H): Primary | ICD-10-CM

## 2018-10-22 DIAGNOSIS — N18.2 TYPE 2 DIABETES MELLITUS WITH STAGE 2 CHRONIC KIDNEY DISEASE, WITH LONG-TERM CURRENT USE OF INSULIN (H): Primary | ICD-10-CM

## 2018-10-22 PROCEDURE — 99214 OFFICE O/P EST MOD 30 MIN: CPT | Performed by: INTERNAL MEDICINE

## 2018-10-22 RX ORDER — LISINOPRIL 2.5 MG/1
2.5 TABLET ORAL DAILY
Qty: 90 TABLET | Refills: 11 | Status: SHIPPED | OUTPATIENT
Start: 2018-10-22 | End: 2019-07-17

## 2018-10-22 RX ORDER — METFORMIN HCL 500 MG
1000 TABLET, EXTENDED RELEASE 24 HR ORAL 2 TIMES DAILY WITH MEALS
Qty: 360 TABLET | Refills: 11 | Status: SHIPPED | OUTPATIENT
Start: 2018-10-22 | End: 2019-07-17

## 2018-10-22 RX ORDER — ATORVASTATIN CALCIUM 10 MG/1
10 TABLET, FILM COATED ORAL DAILY
Qty: 90 TABLET | Refills: 3 | Status: SHIPPED | OUTPATIENT
Start: 2018-10-22 | End: 2019-07-17

## 2018-10-22 NOTE — PROGRESS NOTES
SUBJECTIVE:   Lauri Mcgee is a 73 year old male who presents to clinic today for the following health issues:      Diabetes Follow-up    Patient is checking blood sugars: once daily.  Results are as follows:         Random times, averages vary 120-130    Diabetic concerns: None     Symptoms of hypoglycemia (low blood sugar): none     Paresthesias (numbness or burning in feet) or sores: Yes same numbness as past     Date of last diabetic eye exam: 11/27/17    Sometimes has sugars at 200 before dinner or after dinner, especially if he has a late afternoon snack that is higher in carb.    Diabetes Management Resources    Hyperlipidemia Follow-Up      Rate your low fat/cholesterol diet?: fair    Taking statin?  Yes, no muscle aches from statin    Other lipid medications/supplements?:  none    Hypertension Follow-up      Outpatient blood pressures are not being checked.    Low Salt Diet: no added salt    BP Readings from Last 2 Encounters:   10/22/18 110/62   06/25/18 128/62     Hemoglobin A1C (%)   Date Value   07/23/2018 5.6   03/27/2018 6.3 (H)     LDL Cholesterol Calculated (mg/dL)   Date Value   04/13/2018 64   03/21/2017 53       Amount of exercise or physical activity: 2 days/week for an average of greater than 60 minutes    Problems taking medications regularly: No    Medication side effects: none    Diet: diabetic    COPD Follow-Up    Symptoms are currently: stable    Current fatigue or dyspnea with ambulation: stable     Shortness of breath: stable    Increased or change in Cough/Sputum: No    Fever(s): No    Baseline ambulation without stopping to rest:  stable     Any ER/UC or hospital admissions since your last visit? No     History   Smoking Status     Former Smoker     Packs/day: 0.50     Years: 47.00     Types: Cigarettes     Quit date: 10/1/2013   Smokeless Tobacco     Never Used     Comment: occ     No results found for: FEV1, [id number]    Problem list and histories reviewed & adjusted, as  indicated.  Additional history: as documented    Patient Active Problem List   Diagnosis     Retroperitoneal fibrosis     Gastroesophageal reflux disease without esophagitis     CARDIOVASCULAR SCREENING; LDL GOAL LESS THAN 130     Advanced directives, counseling/discussion     Tubular adenoma of colon     COPD (chronic obstructive pulmonary disease) (H)     Type 2 diabetes mellitus with stage 1 chronic kidney disease, with long-term current use of insulin (H)     Past Surgical History:   Procedure Laterality Date     C NONSPECIFIC PROCEDURE  1971    surgery for herniated disk     CARDIAC NUC ALEX STRESS TEST NL  7/26/06    normal cardiolyte test     TONSILLECTOMY         Social History   Substance Use Topics     Smoking status: Former Smoker     Packs/day: 0.50     Years: 47.00     Types: Cigarettes     Quit date: 10/1/2013     Smokeless tobacco: Never Used      Comment: occ     Alcohol use No     Family History   Problem Relation Age of Onset     Cerebrovascular Disease Father      HEART DISEASE Father      Coronary Artery Disease Father      Circulatory Mother      anurism     Type 2 Diabetes Brother      Diabetes Brother      Colon Cancer Brother      Substance Abuse Brother      Type 2 Diabetes Sister      Diabetes Sister      Type 2 Diabetes Paternal Grandfather      Diabetes Paternal Grandfather      Cerebrovascular Disease Paternal Grandfather      Hypertension Maternal Grandmother      Coronary Artery Disease Maternal Grandfather          Current Outpatient Prescriptions   Medication Sig Dispense Refill     albuterol 90 MCG/ACT inhaler Inhale 2 puffs into the lungs every 6 hours as needed.       aspirin 81 MG tablet Take 1 tablet by mouth daily       atorvastatin (LIPITOR) 10 MG tablet Take 1 tablet (10 mg) by mouth daily 90 tablet 3     azaTHIOprine (IMURAN) 50 MG tablet Take 100 mg by mouth daily  30 tablet 1     blood glucose monitoring (DAQUAN MICROLET) lancets USE ONE  TO CHECK GLUCOSE THREE TIMES DAILY  "200 each 5     Cholecalciferol (VITAMIN D) 1000 UNITS capsule Take 1 capsule by mouth daily.       CONTOUR NEXT TEST test strip USE ONE STRIP TO CHECK GLUCOSE THREE TIMES DAILY BY  IN  VITRO  ROUTE 300 strip 0     fluticasone furoate (ARNUITY ELLIPTA) 100 MCG/ACT AEPB inhalation powder Inhale 1 puff into the lungs daily 3 each 11     insulin aspart (NOVOLOG FLEXPEN) 100 UNIT/ML injection Check your blood sugar before meals as directed. Cover with novolog as follows:  201-250 = 4 units  251-300 = 6 units  301-350 = 8 units  >350 = 10 units and recheck blood sugar in one hour. 15 mL 0     lisinopril (PRINIVIL/ZESTRIL) 2.5 MG tablet Take 1 tablet (2.5 mg) by mouth daily 90 tablet 11     metFORMIN (GLUCOPHAGE-XR) 500 MG 24 hr tablet Take 2 tablets (1,000 mg) by mouth 2 times daily (with meals) 360 tablet 11     predniSONE (DELTASONE) 5 MG tablet Take 1 tablet by mouth daily       ranitidine (ZANTAC) 300 MG tablet Take 1 tablet (300 mg) by mouth At Bedtime 90 tablet 3     umeclidinium-vilanterol (ANORO ELLIPTA) 62.5-25 MCG/INH oral inhaler Inhale 1 puff into the lungs daily        [DISCONTINUED] atorvastatin (LIPITOR) 10 MG tablet Take 1 tablet (10 mg) by mouth daily 30 tablet 5     [DISCONTINUED] lisinopril (PRINIVIL/ZESTRIL) 2.5 MG tablet Take 1 tablet (2.5 mg) by mouth daily 90 tablet 0     [DISCONTINUED] metFORMIN (GLUCOPHAGE-XR) 500 MG 24 hr tablet TAKE 2 TABLETS BY MOUTH TWICE DAILY WITH MEALS 360 tablet 0       Reviewed and updated as needed this visit by clinical staff  Tobacco  Allergies  Meds  Med Hx  Surg Hx  Fam Hx  Soc Hx      Reviewed and updated as needed this visit by Provider         ROS:  Constitutional, HEENT, cardiovascular, pulmonary, GI, , musculoskeletal, neuro, skin, endocrine and psych systems are negative, except as otherwise noted.    OBJECTIVE:     /62 (Cuff Size: Adult Large)  Pulse 61  Temp 98.8  F (37.1  C) (Oral)  Ht 6' 3\" (1.905 m)  Wt 198 lb 8 oz (90 kg)  SpO2 96%  " BMI 24.81 kg/m2  Body mass index is 24.81 kg/(m^2).  GENERAL: healthy, alert and no distress  NECK: no adenopathy, no asymmetry, masses, or scars and thyroid normal to palpation  RESP: lungs clear to auscultation - no rales, rhonchi or wheezes  CV: regular rate and rhythm, normal S1 S2, no S3 or S4, no murmur, click or rub, no peripheral edema and peripheral pulses strong  ABDOMEN: soft, nontender, no hepatosplenomegaly, no masses and bowel sounds normal  MS: no gross musculoskeletal defects noted, no edema    Diagnostic Test Results:  Results for orders placed or performed in visit on 10/09/18   Creatinine   Result Value Ref Range    Creatinine 1.06 0.66 - 1.25 mg/dL    GFR Estimate 68 >60 mL/min/1.7m2    GFR Estimate If Black 82 >60 mL/min/1.7m2       ASSESSMENT/PLAN:     1. Type 2 diabetes mellitus with stage 2 chronic kidney disease, with long-term current use of insulin (H)  Doing well on current regimen. Some elevated sugars in afternoon with later carb laden snacks. Referred to diab ed.  - metFORMIN (GLUCOPHAGE-XR) 500 MG 24 hr tablet; Take 2 tablets (1,000 mg) by mouth 2 times daily (with meals)  Dispense: 360 tablet; Refill: 11  - Prostate spec antigen screen; Future  - **Basic metabolic panel FUTURE anytime; Future  - **TSH with free T4 reflex FUTURE anytime; Future    2. Chronic obstructive pulmonary disease, unspecified COPD type (H)  Stable and doing well.    3. Retroperitoneal fibrosis  Continues to follow with rheum. Concerned whether Dr. Campoverde will be a covered provider when his insurance changes.    4. Screening for prostate cancer      5. CARDIOVASCULAR SCREENING; LDL GOAL LESS THAN 130    - atorvastatin (LIPITOR) 10 MG tablet; Take 1 tablet (10 mg) by mouth daily  Dispense: 90 tablet; Refill: 3    Patient Instructions   nonfasting lab in 3 months.     Lab later this week with previous planned draw.      Yenni Sewell MD  [facility]

## 2018-10-23 ENCOUNTER — TRANSFERRED RECORDS (OUTPATIENT)
Dept: HEALTH INFORMATION MANAGEMENT | Facility: CLINIC | Age: 73
End: 2018-10-23

## 2018-10-25 DIAGNOSIS — N13.30 HYDRONEPHROSIS: Primary | ICD-10-CM

## 2018-10-25 LAB
CREAT SERPL-MCNC: 0.9 MG/DL (ref 0.66–1.25)
GFR SERPL CREATININE-BSD FRML MDRD: 82 ML/MIN/1.7M2

## 2018-10-25 PROCEDURE — 36415 COLL VENOUS BLD VENIPUNCTURE: CPT | Performed by: UROLOGY

## 2018-10-25 PROCEDURE — G0103 PSA SCREENING: HCPCS | Performed by: UROLOGY

## 2018-10-25 PROCEDURE — 82565 ASSAY OF CREATININE: CPT | Performed by: UROLOGY

## 2018-10-26 LAB — PSA SERPL-ACNC: 0.67 UG/L (ref 0–4)

## 2018-11-01 DIAGNOSIS — K21.9 GASTROESOPHAGEAL REFLUX DISEASE WITHOUT ESOPHAGITIS: ICD-10-CM

## 2018-11-01 NOTE — TELEPHONE ENCOUNTER
"Requested Prescriptions   Pending Prescriptions Disp Refills     ranitidine (ZANTAC) 300 MG tablet [Pharmacy Med Name: RANITIDINE 300MG    TAB]    Last Written Prescription Date: 9/25/2017  Last Fill Quantity: 90,  # refills: 3   Last office visit: 10/22/2018 with prescribing provider:  Yenni Sewell     Future Office Visit:   Next 5 appointments (look out 90 days)     Jan 14, 2019  9:10 AM CST   PHYSICAL with Yenni Sewell MD   [facility]    [address] [place] [address]  [address] [place] [id number]   [phone]                  90 tablet 3     Sig: TAKE ONE TABLET BY MOUTH ONCE DAILY AT BEDTIME    H2 Blockers Protocol Passed    11/1/2018  5:30 AM       Passed - Patient is age 12 or older       Passed - Recent (12 mo) or future (30 days) visit within the authorizing provider's specialty    Patient had office visit in the last 12 months or has a visit in the next 30 days with authorizing provider or within the authorizing provider's specialty.  See \"Patient Info\" tab in inbasket, or \"Choose Columns\" in Meds & Orders section of the refill encounter.                "

## 2018-11-02 NOTE — TELEPHONE ENCOUNTER
Pres[place] per [facility] [place].    Zoey Morales RN

## 2018-12-18 ENCOUNTER — ALLIED HEALTH/NURSE VISIT (OUTPATIENT)
Dept: EDUCATION SERVICES | Facility: CLINIC | Age: 73
End: 2018-12-18
Payer: COMMERCIAL

## 2018-12-18 DIAGNOSIS — N18.1 TYPE 2 DIABETES MELLITUS WITH STAGE 1 CHRONIC KIDNEY DISEASE, WITH LONG-TERM CURRENT USE OF INSULIN (H): Primary | ICD-10-CM

## 2018-12-18 DIAGNOSIS — Z79.4 TYPE 2 DIABETES MELLITUS WITH STAGE 1 CHRONIC KIDNEY DISEASE, WITH LONG-TERM CURRENT USE OF INSULIN (H): Primary | ICD-10-CM

## 2018-12-18 DIAGNOSIS — E11.22 TYPE 2 DIABETES MELLITUS WITH STAGE 1 CHRONIC KIDNEY DISEASE, WITH LONG-TERM CURRENT USE OF INSULIN (H): Primary | ICD-10-CM

## 2018-12-18 PROCEDURE — G0108 DIAB MANAGE TRN  PER INDIV: HCPCS

## 2018-12-18 NOTE — PROGRESS NOTES
"Diabetes Self-Management Education & Support    Diabetes Education Self Management & Training    SUBJECTIVE/OBJECTIVE:  Diabetes education in the past 24mo: Yes  Diabetes type: Type 2  Disease course: Stable  Diabetes management related comments/concerns: infrequent glucose spikes  Cultural Influences/Ethnic Background:  American    Diabetes Symptoms & Complications  Blurred vision: No  Fatigue: No  Foot ulcerations: No  Polydipsia: No  Polyphagia: No  Polyuria: Yes  Visual change: No  Weakness: No  Weight loss: No  Slow healing wounds: No  Weight trend: Stable  Autonomic neuropathy: No  CVA: No  Heart disease: No  Nephropathy: No  Peripheral neuropathy: No  Peripheral Vascular Disease: No  Retinopathy: No  Sexual dysfunction: No    Patient Problem List and Family Medical History reviewed for relevant medical history, current medical status, and diabetes risk factors.    Vitals:  There were no vitals taken for this visit.  Estimated body mass index is 24.81 kg/m  as calculated from the following:    Height as of 10/22/18: 1.905 m (6' 3\").    Weight as of 10/22/18: 90 kg (198 lb 8 oz).   Last 3 BP:   BP Readings from Last 3 Encounters:   10/22/18 110/62   06/25/18 128/62   04/30/18 128/68       History   Smoking Status     Former Smoker     Packs/day: 0.50     Years: 47.00     Types: Cigarettes     Quit date: 10/1/2013   Smokeless Tobacco     Never Used     Comment: occ       Labs:  Lab Results   Component Value Date    A1C 5.6 07/23/2018     Lab Results   Component Value Date     07/23/2018     Lab Results   Component Value Date    LDL 64 04/13/2018     HDL Cholesterol   Date Value Ref Range Status   04/13/2018 51 >39 mg/dL Final   ]  GFR Estimate   Date Value Ref Range Status   10/25/2018 82 >60 mL/min/1.7m2 Final     GFR Estimate If Black   Date Value Ref Range Status   10/25/2018 >90 >60 mL/min/1.7m2 Final     Lab Results   Component Value Date    CR 0.90 10/25/2018     No results found for: " MICROALBUMIN    Healthy Eating  Cultural/Hoahaoism diet restrictions?: No  Meal planning: Avoiding sweets, Low salt, Smaller portions  Meals include: Breakfast, Lunch, Dinner, Snacks  Beverages: Water, Tea, Milk  Has patient met with a dietitian in the past?: No  Pt brought in the following food record:          Being Active  How intense was your typical exercise? : Moderate (like brisk walking)  Barrier to exercise: None    Monitoring  Blood Glucose Meter: [employer]  Home Glucose (Sugar) Monitorin-2 times per day  Blood glucose trend: Fluctuating dramtically  Low Glucose Range (mg/dL): 70-90  High Glucose Range (mg/dL): >200  Overall Range (mg/dL): 110-130              Taking Medications  Diabetes Medication(s)     Biguanides Sig    metFORMIN (GLUCOPHAGE-XR) 500 MG 24 hr tablet Take 2 tablets (1,000 mg) by mouth 2 times daily (with meals)    Insulin Sig    insulin aspart (NOVOLOG FLEXPEN) 100 UNIT/ML injection Check your blood sugar before meals as directed. Cover with novolog as follows:  201-250 = 4 units  251-300 = 6 units  301-350 = 8 units  >350 = 10 units and recheck blood sugar in one hour.           Current Treatments: Diet, Oral Agent (monotherapy)    Problem Solving  Hypoglycemia Frequency: Never  Patient carries a carbohydrate source: No  Medical alert: No  Severe weather/disaster plan for diabetes management?: No  DKA prevention plan?: No  Sick day plan for diabetes management?: (P) No    Reducing Risks  CAD Risks: Diabetes Mellitus, Family history, Male sex  Has dilated eye exam at least once a year?: Yes  Sees dentist every 6 months?: Yes  Sees podiatrist (foot doctor)?: No    Healthy Coping  Informal Support system:: Family, Friends  Difficulty affording diabetes management supplies?: No  Patient Activation Measure Survey Score:  MARGARITA Score (Last Two) 2009   MARGARITA Raw Score 34   Activation Score 43.4   MARGARITA Level 1       ASSESSMENT:  Pt varies BG monitoring times between pre and post meal.    Most BG values are in target, occasional BG >200 mg/dl late afternoon. Pt is not sure if related to change in diet or not.       Goals Addressed as of 12/18/2018 at 4:31 PM        Patient Stated      Problem Solving (pt-stated)     Added 12/18/18 by Elva Nguyễn RD     My Goal: I will make notes in my BG record book about my food choices when I see an unexplained elevated blood sugar    I plan to meet my goal by this date: will start within the next week            Patient's most recent   Lab Results   Component Value Date    A1C 5.6 07/23/2018    is meeting goal of <7.0    INTERVENTION:   Diabetes knowledge and skills assessment:     Patient is knowledgeable in diabetes management concepts related to: Healthy Eating, Monitoring, Taking Medication, Reducing Risks and Healthy Coping    Patient needs further education on the following diabetes management concepts: Being Active and Problem Solving    Based on learning assessment above, most appropriate setting for further diabetes education would be: Individual setting.    Education provided today on:  AADE Self-Care Behaviors:  Being Active: relationship to blood glucose (eg initial BG rise followed by improved insulin sensitivity and improved glycemic control for several hours later)  Problem Solving: high blood glucose - causes, signs/symptoms, treatment and prevention, low blood glucose - causes, signs/symptoms, treatment and prevention and when to call health care provider    Opportunities for ongoing education and support in diabetes-self management were discussed.    Pt verbalized understanding of concepts discussed and recommendations provided today.       Education Materials Provided:  No new materials provided today    PLAN:  See Patient Instructions for co-developed, patient-stated behavior change goals.  AVS printed and provided to patient today. See Follow-Up section for recommended follow-up.    Elva Nguyễn RD, CDE  Diabetes Education  Specialist    Time Spent: 45 minutes  Encounter Type: Individual    Any diabetes medication dose changes were made via the CDE Protocol and Collaborative Practice Agreement with the patient's referring provider. A copy of this encounter was shared with the provider.

## 2019-01-14 ENCOUNTER — OFFICE VISIT (OUTPATIENT)
Dept: PEDIATRICS | Facility: CLINIC | Age: 74
End: 2019-01-14
Payer: COMMERCIAL

## 2019-01-14 VITALS
DIASTOLIC BLOOD PRESSURE: 60 MMHG | TEMPERATURE: 98.3 F | OXYGEN SATURATION: 96 % | HEART RATE: 71 BPM | WEIGHT: 193.4 LBS | SYSTOLIC BLOOD PRESSURE: 120 MMHG | BODY MASS INDEX: 24.05 KG/M2 | HEIGHT: 75 IN

## 2019-01-14 DIAGNOSIS — R79.89 ABNORMAL THYROID BLOOD TEST: ICD-10-CM

## 2019-01-14 DIAGNOSIS — K21.9 GASTROESOPHAGEAL REFLUX DISEASE WITHOUT ESOPHAGITIS: ICD-10-CM

## 2019-01-14 DIAGNOSIS — E11.22 TYPE 2 DIABETES MELLITUS WITH STAGE 2 CHRONIC KIDNEY DISEASE, WITH LONG-TERM CURRENT USE OF INSULIN (H): ICD-10-CM

## 2019-01-14 DIAGNOSIS — N18.2 TYPE 2 DIABETES MELLITUS WITH STAGE 2 CHRONIC KIDNEY DISEASE, WITH LONG-TERM CURRENT USE OF INSULIN (H): ICD-10-CM

## 2019-01-14 DIAGNOSIS — Z00.00 MEDICARE ANNUAL WELLNESS VISIT, SUBSEQUENT: Primary | ICD-10-CM

## 2019-01-14 DIAGNOSIS — J44.9 CHRONIC OBSTRUCTIVE PULMONARY DISEASE, UNSPECIFIED COPD TYPE (H): ICD-10-CM

## 2019-01-14 DIAGNOSIS — R94.6 ABNORMAL RESULTS OF THYROID FUNCTION STUDIES: ICD-10-CM

## 2019-01-14 DIAGNOSIS — Z79.4 TYPE 2 DIABETES MELLITUS WITH STAGE 2 CHRONIC KIDNEY DISEASE, WITH LONG-TERM CURRENT USE OF INSULIN (H): ICD-10-CM

## 2019-01-14 DIAGNOSIS — B35.3 TINEA PEDIS OF LEFT FOOT: ICD-10-CM

## 2019-01-14 LAB
ALBUMIN SERPL-MCNC: 3.8 G/DL (ref 3.4–5)
ALP SERPL-CCNC: 38 U/L (ref 40–150)
ALT SERPL W P-5'-P-CCNC: 24 U/L (ref 0–70)
ANION GAP SERPL CALCULATED.3IONS-SCNC: 6 MMOL/L (ref 3–14)
AST SERPL W P-5'-P-CCNC: 15 U/L (ref 0–45)
BILIRUB SERPL-MCNC: 0.4 MG/DL (ref 0.2–1.3)
BUN SERPL-MCNC: 20 MG/DL (ref 7–30)
CALCIUM SERPL-MCNC: 9.9 MG/DL (ref 8.5–10.1)
CHLORIDE SERPL-SCNC: 106 MMOL/L (ref 94–109)
CO2 SERPL-SCNC: 27 MMOL/L (ref 20–32)
CREAT SERPL-MCNC: 1.06 MG/DL (ref 0.66–1.25)
CREAT UR-MCNC: 195 MG/DL
GFR SERPL CREATININE-BSD FRML MDRD: 68 ML/MIN/{1.73_M2}
GLUCOSE SERPL-MCNC: 83 MG/DL (ref 70–99)
HBA1C MFR BLD: 5.8 % (ref 0–5.6)
MICROALBUMIN UR-MCNC: 19 MG/L
MICROALBUMIN/CREAT UR: 9.85 MG/G CR (ref 0–17)
POTASSIUM SERPL-SCNC: 4.3 MMOL/L (ref 3.4–5.3)
PROT SERPL-MCNC: 6.7 G/DL (ref 6.8–8.8)
SODIUM SERPL-SCNC: 139 MMOL/L (ref 133–144)
T4 FREE SERPL-MCNC: 1.09 NG/DL (ref 0.76–1.46)
TSH SERPL DL<=0.005 MIU/L-ACNC: 0.09 MU/L (ref 0.4–4)

## 2019-01-14 PROCEDURE — 36415 COLL VENOUS BLD VENIPUNCTURE: CPT | Performed by: INTERNAL MEDICINE

## 2019-01-14 PROCEDURE — 84439 ASSAY OF FREE THYROXINE: CPT | Performed by: INTERNAL MEDICINE

## 2019-01-14 PROCEDURE — 99397 PER PM REEVAL EST PAT 65+ YR: CPT | Performed by: INTERNAL MEDICINE

## 2019-01-14 PROCEDURE — 82043 UR ALBUMIN QUANTITATIVE: CPT | Performed by: INTERNAL MEDICINE

## 2019-01-14 PROCEDURE — 83036 HEMOGLOBIN GLYCOSYLATED A1C: CPT | Performed by: INTERNAL MEDICINE

## 2019-01-14 PROCEDURE — 99207 C FOOT EXAM  NO CHARGE: CPT | Mod: 25 | Performed by: INTERNAL MEDICINE

## 2019-01-14 PROCEDURE — 84443 ASSAY THYROID STIM HORMONE: CPT | Performed by: INTERNAL MEDICINE

## 2019-01-14 PROCEDURE — 80053 COMPREHEN METABOLIC PANEL: CPT | Performed by: INTERNAL MEDICINE

## 2019-01-14 PROCEDURE — 99213 OFFICE O/P EST LOW 20 MIN: CPT | Mod: 25 | Performed by: INTERNAL MEDICINE

## 2019-01-14 ASSESSMENT — ENCOUNTER SYMPTOMS
DIZZINESS: 0
COUGH: 0
DIARRHEA: 0
ABDOMINAL PAIN: 0
HEMATOCHEZIA: 0
NERVOUS/ANXIOUS: 0
HEADACHES: 0
CONSTIPATION: 0
DYSURIA: 0
SORE THROAT: 0
ARTHRALGIAS: 0
NAUSEA: 0
FEVER: 0
JOINT SWELLING: 0
CHILLS: 0
PALPITATIONS: 0
PARESTHESIAS: 0
SHORTNESS OF BREATH: 0
FREQUENCY: 0
WEAKNESS: 0
MYALGIAS: 0
HEARTBURN: 0
EYE PAIN: 0
HEMATURIA: 0

## 2019-01-14 ASSESSMENT — MIFFLIN-ST. JEOR: SCORE: 1694.76

## 2019-01-14 ASSESSMENT — ACTIVITIES OF DAILY LIVING (ADL): CURRENT_FUNCTION: NO ASSISTANCE NEEDED

## 2019-01-14 NOTE — PROGRESS NOTES
"SUBJECTIVE:   Lauri Mcgee is a 73 year old male who presents for Preventive Visit.    Are you in the first 12 months of your Medicare coverage?  No    Annual Wellness Visit     In general, how would you rate your overall health?  Good    Frequency of exercise:  2-3 days/week    Duration of exercise:  Greater than 60 minutes    Do you usually eat at least 4 servings of fruit and vegetables a day, include whole grains    & fiber and avoid regularly eating high fat or \"junk\" foods?  Yes    Taking medications regularly:  Yes    Medication side effects:  Other    Ability to successfully perform activities of daily living:  No assistance needed    Home Safety:  Throw rugs in the hallway    Hearing Impairment:  Difficulty following a conversation in a noisy restaurant or crowded room and difficulty understanding soft or whispered speech    In the past 6 months, have you been bothered by leaking of urine?  No    In general, how would you rate your overall mental or emotional health?  Excellent    PHQ-2 Total Score: 0    Additional concerns today:  Yes    Do you feel safe in your environment? Yes    Do you have a Health Care Directive? Yes: Advance Directive has been received and scanned.      Fall risk  Fallen 2 or more times in the past year?: No  Any fall with injury in the past year?: No    Cognitive Screening   1) Repeat 3 items (Leader, Season, Table)    2) Clock draw: NORMAL  3) 3 item recall: Recalls 3 objects  Results: 3 items recalled: COGNITIVE IMPAIRMENT LESS LIKELY    Mini-CogTM Copyright S Sof[employer]. Licensed by the author for use in [facility]; reprinted with permission ([email or web address]@.[email or web address]). All rights reserved.      Do you have sleep apnea, excessive snoring or daytime drowsiness?: no    Reviewed and updated as needed this visit by clinical staff  Tobacco  Allergies  Meds  Med Hx  Surg Hx  Fam Hx  Soc Hx        Reviewed and updated as needed this visit by Provider        Social History "     Tobacco Use     Smoking status: Former Smoker     Packs/day: 0.50     Years: 47.00     Pack years: 23.50     Types: Cigarettes     Last attempt to quit: 10/1/2013     Years since quittin.2     Smokeless tobacco: Never Used     Tobacco comment: occ   Substance Use Topics     Alcohol use: No     Alcohol/week: 0.0 oz       Alcohol Use 2019   If you drink alcohol do you typically have greater than 3 drinks per day OR greater than 7 drinks per week? No       COPD: feels breathing is about the same. Does think since going off omeprazole and starting zantac, he is having almost daily heartburn. Sometimes before supper, sometimes after.     Diabetes Follow-up    Patient is checking blood sugars: once daily.  Results are as follows:             Diabetic concerns: None     Symptoms of hypoglycemia (low blood sugar): none     Paresthesias (numbness or burning in feet) or sores: No     Date of last diabetic eye exam: 18    Diabetes Management Resources    Hyperlipidemia Follow-Up      Rate your low fat/cholesterol diet?: not monitoring fat    Taking statin?  Yes, no muscle aches from statin    Other lipid medications/supplements?:  Fish oil/Omega 3, dose  without side effects    Hypertension Follow-up      Outpatient blood pressures are not being checked.    Low Salt Diet: no added salt    BP Readings from Last 2 Encounters:   19 120/60   10/22/18 110/62     Hemoglobin A1C (%)   Date Value   2018 5.6   2018 6.3 (H)     LDL Cholesterol Calculated (mg/dL)   Date Value   2018 64   2017 53       Current providers sharing in care for this patient include:   Patient Care Team:  Yenni Sewell MD as PCP - General  Yenni Sewell MD as PCP - Assigned PCP  Elva Nguyễn RD as Diabetes Educator (Dietitian, Registered)    The following health maintenance items are reviewed in Epic and correct as of today:  Health Maintenance   Topic Date Due     TSH W/ FREE T4 REFLEX Q2  YEAR  2018     MICROALBUMIN Q1 YEAR  2018     EYE EXAM Q1 YEAR  2018     A1C Q6 MO  2019     FOOT EXAM Q1 YEAR  2019     COPD ACTION PLAN Q1 YR  2019     LIPID MONITORING Q1 YEAR  2019     FALL RISK ASSESSMENT  10/22/2019     PHQ-2 Q1 YR  10/22/2019     CREATININE Q1 YEAR  10/25/2019     PSA Q1 YR  10/25/2019     ADVANCE DIRECTIVE PLANNING Q5 YRS  2021     COLONOSCOPY Q5 YR  2022     DTAP/TDAP/TD IMMUNIZATION (3 - Td) 2023     SPIROMETRY ONETIME  Completed     INFLUENZA VACCINE  Completed     ZOSTER IMMUNIZATION  Completed     AORTIC ANEURYSM SCREENING (SYSTEM ASSIGNED)  Completed     IPV IMMUNIZATION  Aged Out     MENINGITIS IMMUNIZATION  Aged Out     BP Readings from Last 3 Encounters:   19 120/60   10/22/18 110/62   18 128/62    Wt Readings from Last 3 Encounters:   19 87.7 kg (193 lb 6.4 oz)   10/22/18 90 kg (198 lb 8 oz)   18 90 kg (198 lb 6.4 oz)                  Patient Active Problem List   Diagnosis     Retroperitoneal fibrosis     Gastroesophageal reflux disease without esophagitis     CARDIOVASCULAR SCREENING; LDL GOAL LESS THAN 130     Advanced directives, counseling/discussion     Tubular adenoma of colon     COPD (chronic obstructive pulmonary disease) (H)     Type 2 diabetes mellitus with stage 1 chronic kidney disease, with long-term current use of insulin (H)     Past Surgical History:   Procedure Laterality Date     C NONSPECIFIC PROCEDURE  1971    surgery for herniated disk     CARDIAC NUC ALEX STRESS TEST NL  06    normal cardiolyte test     TONSILLECTOMY         Social History     Tobacco Use     Smoking status: Former Smoker     Packs/day: 0.50     Years: 47.00     Pack years: 23.50     Types: Cigarettes     Last attempt to quit: 10/1/2013     Years since quittin.2     Smokeless tobacco: Never Used     Tobacco comment: occ   Substance Use Topics     Alcohol use: No     Alcohol/week: 0.0 oz     Family  "History   Problem Relation Age of Onset     Cerebrovascular Disease Father      Heart Disease Father      Coronary Artery Disease Father      Circulatory Mother         anurism     Diabetes Type 2  Brother      Diabetes Brother      Colon Cancer Brother      Substance Abuse Brother      Diabetes Type 2  Sister      Diabetes Sister      Diabetes Type 2  Paternal Grandfather      Diabetes Paternal Grandfather      Cerebrovascular Disease Paternal Grandfather      Hypertension Maternal Grandmother      Coronary Artery Disease Maternal Grandfather            Review of Systems   Constitutional: Negative for chills and fever.   HENT: Negative for congestion, ear pain, hearing loss and sore throat.    Eyes: Negative for pain and visual disturbance.   Respiratory: Negative for cough and shortness of breath.    Cardiovascular: Negative for chest pain, palpitations and peripheral edema.   Gastrointestinal: Negative for abdominal pain, constipation, diarrhea, heartburn, hematochezia and nausea.   Genitourinary: Negative for discharge, dysuria, frequency, genital sores, hematuria, impotence and urgency.   Musculoskeletal: Negative for arthralgias, joint swelling and myalgias.   Skin: Negative for rash.   Neurological: Negative for dizziness, weakness, headaches and paresthesias.   Psychiatric/Behavioral: Negative for mood changes. The patient is not nervous/anxious.        OBJECTIVE:   /60 (Cuff Size: Adult Regular)   Pulse 71   Temp 98.3  F (36.8  C) (Oral)   Ht 1.9 m (6' 2.8\")   Wt 87.7 kg (193 lb 6.4 oz)   SpO2 96%   BMI 24.30 kg/m   Estimated body mass index is 24.3 kg/m  as calculated from the following:    Height as of this encounter: 1.9 m (6' 2.8\").    Weight as of this encounter: 87.7 kg (193 lb 6.4 oz).  Physical Exam  GENERAL: healthy, alert and no distress  EYES: Eyes grossly normal to inspection, PERRL and conjunctivae and sclerae normal  HENT: ear canals and TM's normal, nose and mouth without ulcers " or lesions  NECK: no adenopathy, no asymmetry, masses, or scars and thyroid normal to palpation  RESP: lungs clear to auscultation - no rales, rhonchi or wheezes  CV: regular rate and rhythm, normal S1 S2, no S3 or S4, no murmur, click or rub, no peripheral edema and peripheral pulses strong  ABDOMEN: soft, nontender, no hepatosplenomegaly, no masses and bowel sounds normal  MS: no gross musculoskeletal defects noted, no edema  SKIN: no suspicious lesions or rashes  NEURO: Normal strength and tone, mentation intact and speech normal  PSYCH: mentation appears normal, affect normal/bright  Diabetic foot exam: normal DP and PT pulses, no trophic changes or ulcerative lesions, normal sensory exam and tinea pedis between L 4th and pinky toes.    Diagnostic Test Results:  Results for orders placed or performed in visit on 10/25/18   PSA, screen   Result Value Ref Range    PSA 0.67 0 - 4 ug/L   Creatinine   Result Value Ref Range    Creatinine 0.90 0.66 - 1.25 mg/dL    GFR Estimate 82 >60 mL/min/1.7m2    GFR Estimate If Black >90 >60 mL/min/1.7m2       ASSESSMENT / PLAN:   1. Medicare annual wellness visit, subsequent      2. Chronic obstructive pulmonary disease, unspecified COPD type (H)  Stable.    3. Gastroesophageal reflux disease without esophagitis  Plan trial on bid dosing for zantac. Low threshold to restart PPI. Does have known hiatal hernia.  - ranitidine (ZANTAC) 150 MG tablet; Take 1 tablet (150 mg) by mouth 2 times daily  Dispense: 60 tablet; Refill: 11    4. Type 2 diabetes mellitus with stage 2 chronic kidney disease, with long-term current use of insulin (H)  nonfasting lab today.  - **TSH with free T4 reflex FUTURE anytime    5. Tinea pedis of left foot  Discussed OTC treatment options.      End of Life Planning:  Patient currently has an advanced directive: Yes.  Practitioner is supportive of decision.    COUNSELING:  Reviewed preventive health counseling, as reflected in patient instructions    BP  "Readings from Last 1 Encounters:   01/14/19 120/60     Estimated body mass index is 24.3 kg/m  as calculated from the following:    Height as of this encounter: 1.9 m (6' 2.8\").    Weight as of this encounter: 87.7 kg (193 lb 6.4 oz).           reports that he quit smoking about 5 years ago. His smoking use included cigarettes. He has a 23.50 pack-year smoking history. he has never used smokeless tobacco.      Appropriate preventive services were discussed with this patient, including applicable screening as appropriate for cardiovascular disease, diabetes, osteopenia/osteoporosis, and glaucoma.  As appropriate for age/gender, discussed screening for colorectal cancer, prostate cancer, breast cancer, and cervical cancer. Checklist reviewing preventive services available has been given to the patient.    Reviewed patients plan of care and provided an AVS. The Intermediate Care Plan ( asthma action plan, low back pain action plan, and migraine action plan) for Lauri meets the Care Plan requirement. This Care Plan has been established and reviewed with the Patient.    Counseling Resources:  ATP IV Guidelines  Pooled Cohorts Equation Calculator  Breast Cancer Risk Calculator  FRAX Risk Assessment  ICSI Preventive Guidelines  Dietary Guidelines for Americans, 2010  [employer]'s MyPlate  ASA Prophylaxis  Lung CA Screening    Yenni Sewell MD  [facility]  "

## 2019-01-14 NOTE — PATIENT INSTRUCTIONS
Preventive Health Recommendations:     See your health care provider every year to    Review health changes.     Discuss preventive care.      Review your medicines if your doctor has prescribed any.    Talk with your health care provider about whether you should have a test to screen for prostate cancer (PSA).    Every 3 years, have a diabetes test (fasting glucose). If you are at risk for diabetes, you should have this test more often.    Every 5 years, have a cholesterol test. Have this test more often if you are at risk for high cholesterol or heart disease.     Every 10 years, have a colonoscopy. Or, have a yearly FIT test (stool test). These exams will check for colon cancer.    Talk to with your health care provider about screening for Abdominal Aortic Aneurysm if you have a family history of AAA or have a history of smoking.  Shots:     Get a flu shot each year.     Get a tetanus shot every 10 years.     Talk to your doctor about your pneumonia vaccines. There are now two you should receive - Pneumovax (PPSV 23) and Prevnar (PCV 13).    Talk to your pharmacist about a shingles vaccine.     Talk to your doctor about the hepatitis B vaccine.  Nutrition:     Eat at least 5 servings of fruits and vegetables each day.     Eat whole-grain bread, whole-wheat pasta and brown rice instead of white grains and rice.     Get adequate Calcium and Vitamin D.   Lifestyle    Exercise for at least 150 minutes a week (30 minutes a day, 5 days a week). This will help you control your weight and prevent disease.     Limit alcohol to one drink per day.     No smoking.     Wear sunscreen to prevent skin cancer.     See your dentist every six months for an exam and cleaning.     See your eye doctor every 1 to 2 years to screen for conditions such as glaucoma, macular degeneration and cataracts.    Personalized Prevention Plan  You are due for the preventive services outlined below.  Your care team is available to assist you in  scheduling these services.  If you have already completed any of these items, please share that information with your care team to update in your medical record.    Health Maintenance Due   Topic Date Due     Thyroid Function Lab (TSH) - every 2 years  08/02/2018     Microalbumin Lab - yearly  09/25/2018     Eye Exam - yearly  11/27/2018     A1C (Diabetes) Lab - every 6 months  01/23/2019       Try switching ranitidine to 150 mg morning and suppertime.  If you are still getting symptoms more than twice a week, we should go back to omeprazole. We could start with 10 mg of omeprazole with ranitidine. If you do well, we could try stopping the ranitidine.     eucerin intensive repair for feet.  Athlete's foot cream for 4-6 weeks. You can get this OTC.

## 2019-01-15 NOTE — NURSING NOTE
Cerumenosis is noted bilaterally per provider. Wax is removed by syringing and manual debridement. Instructions for home care to prevent wax buildup are given.  Alessandra Mendoza LPN

## 2019-01-31 ENCOUNTER — TRANSFERRED RECORDS (OUTPATIENT)
Dept: HEALTH INFORMATION MANAGEMENT | Facility: CLINIC | Age: 74
End: 2019-01-31

## 2019-02-07 ENCOUNTER — TRANSFERRED RECORDS (OUTPATIENT)
Dept: HEALTH INFORMATION MANAGEMENT | Facility: CLINIC | Age: 74
End: 2019-02-07

## 2019-02-21 DIAGNOSIS — N18.2 TYPE 2 DIABETES MELLITUS WITH STAGE 2 CHRONIC KIDNEY DISEASE, WITH LONG-TERM CURRENT USE OF INSULIN (H): ICD-10-CM

## 2019-02-21 DIAGNOSIS — E11.22 TYPE 2 DIABETES MELLITUS WITH STAGE 2 CHRONIC KIDNEY DISEASE, WITH LONG-TERM CURRENT USE OF INSULIN (H): ICD-10-CM

## 2019-02-21 DIAGNOSIS — Z79.4 TYPE 2 DIABETES MELLITUS WITH STAGE 2 CHRONIC KIDNEY DISEASE, WITH LONG-TERM CURRENT USE OF INSULIN (H): ICD-10-CM

## 2019-02-21 DIAGNOSIS — R94.6 ABNORMAL RESULTS OF THYROID FUNCTION STUDIES: ICD-10-CM

## 2019-02-21 LAB
ANION GAP SERPL CALCULATED.3IONS-SCNC: 3 MMOL/L (ref 3–14)
BUN SERPL-MCNC: 18 MG/DL (ref 7–30)
CALCIUM SERPL-MCNC: 9.2 MG/DL (ref 8.5–10.1)
CHLORIDE SERPL-SCNC: 105 MMOL/L (ref 94–109)
CO2 SERPL-SCNC: 29 MMOL/L (ref 20–32)
CREAT SERPL-MCNC: 1.1 MG/DL (ref 0.66–1.25)
GFR SERPL CREATININE-BSD FRML MDRD: 65 ML/MIN/{1.73_M2}
GLUCOSE SERPL-MCNC: 91 MG/DL (ref 70–99)
POTASSIUM SERPL-SCNC: 3.8 MMOL/L (ref 3.4–5.3)
SODIUM SERPL-SCNC: 137 MMOL/L (ref 133–144)
T4 FREE SERPL-MCNC: 0.78 NG/DL (ref 0.76–1.46)
TSH SERPL DL<=0.005 MIU/L-ACNC: 4.14 MU/L (ref 0.4–4)

## 2019-02-21 PROCEDURE — 84439 ASSAY OF FREE THYROXINE: CPT | Performed by: INTERNAL MEDICINE

## 2019-02-21 PROCEDURE — 84443 ASSAY THYROID STIM HORMONE: CPT | Performed by: INTERNAL MEDICINE

## 2019-02-21 PROCEDURE — 80048 BASIC METABOLIC PNL TOTAL CA: CPT | Performed by: INTERNAL MEDICINE

## 2019-02-21 PROCEDURE — 36415 COLL VENOUS BLD VENIPUNCTURE: CPT | Performed by: INTERNAL MEDICINE

## 2019-04-17 DIAGNOSIS — E09.9 DIABETES MELLITUS, DRUG-RELATED (H): ICD-10-CM

## 2019-04-17 RX ORDER — LANCETS
EACH MISCELLANEOUS
Qty: 200 EACH | Refills: 1 | Status: SHIPPED | OUTPATIENT
Start: 2019-04-17 | End: 2019-07-17

## 2019-04-17 NOTE — TELEPHONE ENCOUNTER
Prescription approved per [facility], [facility] or [facility] refill protocol.  Yaneth SIMMS RN - Triage  [facility]

## 2019-04-17 NOTE — TELEPHONE ENCOUNTER
"Requested Prescriptions   Pending Prescriptions Disp Refills     MICROLET LANCETS MISC [Pharmacy Med Name: [facility] CASSID[facility]]  Last Written Prescription Date:  3/13/2018  Last Fill Quantity: 200 each,  # refills: 5   Last office visit: 1/14/2019 with prescribing provider:  Melodie     Future Office Visit:       200 each 5     Sig: USE ONE TO CHECK GLUCOSE THREE TIMES DAILY       Diabetic Supplies Protocol Passed - 4/17/2019  7:42 AM        Passed - Medication is active on med list        Passed - Patient is 18 years of age or older        Passed - Recent (6 mo) or future (30 days) visit within the authorizing provider's specialty     Patient had office visit in the last 6 months or has a visit in the next 30 days with authorizing provider.  See \"Patient Info\" tab in inbasket, or \"Choose Columns\" in Meds & Orders section of the refill encounter.            "

## 2019-04-29 ENCOUNTER — TRANSFERRED RECORDS (OUTPATIENT)
Dept: HEALTH INFORMATION MANAGEMENT | Facility: CLINIC | Age: 74
End: 2019-04-29

## 2019-05-01 ENCOUNTER — MYC REFILL (OUTPATIENT)
Dept: PEDIATRICS | Facility: CLINIC | Age: 74
End: 2019-05-01

## 2019-05-01 DIAGNOSIS — E09.9 DIABETES MELLITUS, DRUG-RELATED (H): ICD-10-CM

## 2019-05-02 NOTE — TELEPHONE ENCOUNTER
"Prescription approved per [facility], [facility] or [facility] refill protocol.  Yaneth SIMMS RN - Triage  [facility]        Requested Prescriptions   Pending Prescriptions Disp Refills     blood glucose (CONTOUR NEXT TEST) test strip 300 strip 0     Sig: Use to test blood sugar 3 times daily or as directed.       Diabetic Supplies Protocol Passed - 5/1/2019 12:06 PM        Passed - Medication is active on med list        Passed - Patient is 18 years of age or older        Passed - Recent (6 mo) or future (30 days) visit within the authorizing provider's specialty     Patient had office visit in the last 6 months or has a visit in the next 30 days with authorizing provider.  See \"Patient Info\" tab in inbasket, or \"Choose Columns\" in Meds & Orders section of the refill encounter.              "

## 2019-07-08 ENCOUNTER — TRANSFERRED RECORDS (OUTPATIENT)
Dept: HEALTH INFORMATION MANAGEMENT | Facility: CLINIC | Age: 74
End: 2019-07-08

## 2019-07-17 ENCOUNTER — OFFICE VISIT (OUTPATIENT)
Dept: PEDIATRICS | Facility: CLINIC | Age: 74
End: 2019-07-17
Payer: COMMERCIAL

## 2019-07-17 VITALS
RESPIRATION RATE: 16 BRPM | OXYGEN SATURATION: 97 % | HEART RATE: 63 BPM | SYSTOLIC BLOOD PRESSURE: 120 MMHG | BODY MASS INDEX: 25.4 KG/M2 | WEIGHT: 197.9 LBS | DIASTOLIC BLOOD PRESSURE: 52 MMHG | TEMPERATURE: 96.2 F | HEIGHT: 74 IN

## 2019-07-17 DIAGNOSIS — E11.22 TYPE 2 DIABETES MELLITUS WITH STAGE 2 CHRONIC KIDNEY DISEASE, WITH LONG-TERM CURRENT USE OF INSULIN (H): Primary | ICD-10-CM

## 2019-07-17 DIAGNOSIS — K21.9 GASTROESOPHAGEAL REFLUX DISEASE WITHOUT ESOPHAGITIS: ICD-10-CM

## 2019-07-17 DIAGNOSIS — N18.2 TYPE 2 DIABETES MELLITUS WITH STAGE 2 CHRONIC KIDNEY DISEASE, WITH LONG-TERM CURRENT USE OF INSULIN (H): Primary | ICD-10-CM

## 2019-07-17 DIAGNOSIS — Z87.891 PERSONAL HISTORY OF TOBACCO USE: ICD-10-CM

## 2019-07-17 DIAGNOSIS — Z79.4 TYPE 2 DIABETES MELLITUS WITH STAGE 2 CHRONIC KIDNEY DISEASE, WITH LONG-TERM CURRENT USE OF INSULIN (H): Primary | ICD-10-CM

## 2019-07-17 DIAGNOSIS — Z13.6 CARDIOVASCULAR SCREENING; LDL GOAL LESS THAN 130: ICD-10-CM

## 2019-07-17 LAB
CHOLEST SERPL-MCNC: 118 MG/DL
HBA1C MFR BLD: 5.5 % (ref 0–5.6)
HDLC SERPL-MCNC: 48 MG/DL
LDLC SERPL CALC-MCNC: 54 MG/DL
NONHDLC SERPL-MCNC: 70 MG/DL
TRIGL SERPL-MCNC: 79 MG/DL

## 2019-07-17 PROCEDURE — 80061 LIPID PANEL: CPT | Performed by: INTERNAL MEDICINE

## 2019-07-17 PROCEDURE — 36415 COLL VENOUS BLD VENIPUNCTURE: CPT | Performed by: INTERNAL MEDICINE

## 2019-07-17 PROCEDURE — 83036 HEMOGLOBIN GLYCOSYLATED A1C: CPT | Performed by: INTERNAL MEDICINE

## 2019-07-17 PROCEDURE — 99214 OFFICE O/P EST MOD 30 MIN: CPT | Performed by: INTERNAL MEDICINE

## 2019-07-17 RX ORDER — ATORVASTATIN CALCIUM 10 MG/1
10 TABLET, FILM COATED ORAL DAILY
Qty: 90 TABLET | Refills: 3 | Status: SHIPPED | OUTPATIENT
Start: 2019-07-17 | End: 2020-07-13

## 2019-07-17 RX ORDER — LISINOPRIL 2.5 MG/1
2.5 TABLET ORAL DAILY
Qty: 90 TABLET | Refills: 11 | Status: SHIPPED | OUTPATIENT
Start: 2019-07-17 | End: 2020-07-13

## 2019-07-17 RX ORDER — METFORMIN HCL 500 MG
1000 TABLET, EXTENDED RELEASE 24 HR ORAL 2 TIMES DAILY WITH MEALS
Qty: 360 TABLET | Refills: 11 | Status: SHIPPED | OUTPATIENT
Start: 2019-07-17 | End: 2020-07-13

## 2019-07-17 RX ORDER — LANCETS
EACH MISCELLANEOUS
Qty: 200 EACH | Refills: 1 | Status: SHIPPED | OUTPATIENT
Start: 2019-07-17 | End: 2021-01-07

## 2019-07-17 ASSESSMENT — MIFFLIN-ST. JEOR: SCORE: 1705.6

## 2019-07-17 NOTE — PROGRESS NOTES
Subjective     Lauri Mcgee is a 73 year old male who presents to clinic today for the following health issues:    HPI     Diabetes Follow-up      How often are you checking your blood sugar? One time daily    What time of day are you checking your blood sugars (select all that apply)?  Before and after meals    Have you had any blood sugars above 200?  No    Have you had any blood sugars below 70?  No    What symptoms do you notice when your blood sugar is low?  None    What concerns do you have today about your diabetes? None     Do you have any of these symptoms? (Select all that apply)  No numbness or tingling in feet.  No redness, sores or blisters on feet.  No complaints of excessive thirst.  No reports of blurry vision.  No significant changes to weight.     Have you had a diabetic eye exam in the last 12 months? Yes- Date of last eye exam: November 2018    BP Readings from Last 2 Encounters:   07/17/19 120/52   01/14/19 120/60     Hemoglobin A1C (%)   Date Value   07/17/2019 5.5   01/14/2019 5.8 (H)     LDL Cholesterol Calculated (mg/dL)   Date Value   07/17/2019 54   04/13/2018 64       Diabetes Management Resources    Amount of exercise or physical activity: 6-7 days/week for an average of greater than 60 minutes    Problems taking medications regularly: No    Medication side effects: none    Diet: diabetic      Patient Active Problem List   Diagnosis     Retroperitoneal fibrosis     Gastroesophageal reflux disease without esophagitis     CARDIOVASCULAR SCREENING; LDL GOAL LESS THAN 130     Advanced directives, counseling/discussion     Tubular adenoma of colon     COPD (chronic obstructive pulmonary disease) (H)     Type 2 diabetes mellitus with stage 1 chronic kidney disease, with long-term current use of insulin (H)     Abnormal CT lung screening     Past Surgical History:   Procedure Laterality Date     C NONSPECIFIC PROCEDURE  1971    surgery for herniated disk     CARDIAC NUC ALEX STRESS TEST NL   06    normal cardiolyte test     TONSILLECTOMY         Social History     Tobacco Use     Smoking status: Former Smoker     Packs/day: 0.50     Years: 47.00     Pack years: 23.50     Types: Cigarettes     Last attempt to quit: 10/1/2013     Years since quittin.9     Smokeless tobacco: Never Used     Tobacco comment: occ   Substance Use Topics     Alcohol use: No     Alcohol/week: 0.0 oz     Family History   Problem Relation Age of Onset     Cerebrovascular Disease Father      Heart Disease Father      Coronary Artery Disease Father      Circulatory Mother         anurism     Diabetes Type 2  Brother      Diabetes Brother      Colon Cancer Brother      Substance Abuse Brother      Diabetes Type 2  Sister      Diabetes Sister      Diabetes Type 2  Paternal Grandfather      Diabetes Paternal Grandfather      Cerebrovascular Disease Paternal Grandfather      Hypertension Maternal Grandmother      Coronary Artery Disease Maternal Grandfather          Current Outpatient Medications   Medication Sig Dispense Refill     albuterol 90 MCG/ACT inhaler Inhale 2 puffs into the lungs every 6 hours as needed.       aspirin 81 MG tablet Take 1 tablet by mouth daily       atorvastatin (LIPITOR) 10 MG tablet Take 1 tablet (10 mg) by mouth daily 90 tablet 3     azaTHIOprine (IMURAN) 50 MG tablet Take 100 mg by mouth daily  30 tablet 1     blood glucose (CONTOUR NEXT TEST) test strip Use to test blood sugar 3 times daily or as directed. 300 strip 0     Cholecalciferol (VITAMIN D) 1000 UNITS capsule Take 1 capsule by mouth daily.       Fluticasone-Umeclidin-Vilanterol (TRELEGY ELLIPTA) 100-62.5-25 MCG/INH oral inhaler Inhale 1 puff into the lungs daily 60 Inhaler      lisinopril (PRINIVIL/ZESTRIL) 2.5 MG tablet Take 1 tablet (2.5 mg) by mouth daily 90 tablet 11     metFORMIN (GLUCOPHAGE-XR) 500 MG 24 hr tablet Take 2 tablets (1,000 mg) by mouth 2 times daily (with meals) 360 tablet 11     MICROLET LANCETS MISC Test twice daily.  "200 each 1     predniSONE (DELTASONE) 5 MG tablet Take 1 tablet by mouth daily       ranitidine (ZANTAC) 150 MG tablet Take 1 tablet (150 mg) by mouth 2 times daily 60 tablet 11       Reviewed and updated as needed this visit by Provider  Meds         Review of Systems   ROS COMP: Constitutional, HEENT, cardiovascular, pulmonary, gi and gu systems are negative, except as otherwise noted.      Objective    /52 (BP Location: Right arm, Patient Position: Sitting, Cuff Size: Adult Regular)   Pulse 63   Temp 96.2  F (35.7  C) (Tympanic)   Resp 16   Ht 1.885 m (6' 2.2\")   Wt 89.8 kg (197 lb 14.4 oz)   SpO2 97%   BMI 25.27 kg/m    Body mass index is 25.27 kg/m .  Physical Exam   GENERAL: healthy, alert and no distress  NECK: no adenopathy, no asymmetry, masses, or scars and thyroid normal to palpation  RESP: lungs clear to auscultation - no rales, rhonchi or wheezes  CV: regular rate and rhythm, normal S1 S2, no S3 or S4, no murmur, click or rub, no peripheral edema and peripheral pulses strong  ABDOMEN: soft, nontender, no hepatosplenomegaly, no masses and bowel sounds normal  MS: no gross musculoskeletal defects noted, no edema    Diagnostic Test Results:  Labs reviewed in Epic        Assessment & Plan       ICD-10-CM    1. Type 2 diabetes mellitus with stage 2 chronic kidney disease, with long-term current use of insulin (H) E11.22 Hemoglobin A1c    N18.2 Lipid panel reflex to direct LDL Fasting    Z79.4 metFORMIN (GLUCOPHAGE-XR) 500 MG 24 hr tablet     lisinopril (PRINIVIL/ZESTRIL) 2.5 MG tablet   2. Gastroesophageal reflux disease without esophagitis K21.9 ranitidine (ZANTAC) 150 MG tablet   3. Personal history of tobacco use Z87.891 Prof Fee: Shared Decision Making Visit for Lung Cancer Screening     CT Chest Lung Cancer Scrn Low Dose wo   4. CARDIOVASCULAR SCREENING; LDL GOAL LESS THAN 130 Z13.6 atorvastatin (LIPITOR) 10 MG tablet        BMI:   Estimated body mass index is 25.27 kg/m  as calculated " "from the following:    Height as of this encounter: 1.885 m (6' 2.2\").    Weight as of this encounter: 89.8 kg (197 lb 14.4 oz).           Patient Instructions     Lung Cancer Screening   Frequently Asked Questions  If you are at high-risk for lung cancer, getting screened with low-dose computed tomography (LDCT) every year can help save your life. This handout offers answers to some of the most common questions about lung cancer screening. If you have other questions, please call [phone] ([phone]).     What is it?  Lung cancer screening uses special X-ray technology to create an image of your lung tissue. The exam is quick and easy and takes less than 10 seconds. We don t give you any medicine or use any needles. You can eat before and after the exam. You don t need to change your clothes as long as the clothing on your chest doesn t contain metal. But, you do need to be able to hold your breath for at least 6 seconds during the exam.    What is the goal of lung cancer screening?  The goal of lung cancer screening is to save lives. Many times, lung cancer is not found until a person starts having physical symptoms. Lung cancer screening can help detect lung cancer in the earliest stages when it may be easier to treat.    Who should be screened for lung cancer?  We suggest lung cancer screening for anyone who is at high-risk for lung cancer. You are in the high-risk group if you:      are between the ages of 55 and 79, and    have smoked at least 1 pack of cigarettes a day for 30 or more years, and    still smoke or have quit within the past 15 years.    However, if you have a new cough or shortness of breath, you should talk to your doctor before being screened.    Some national lung health advocacy groups also recommend screening for people ages 50 to 79 who have smoked an average of 1 pack of cigarettes a day for 20 years. They must also have at least 1 other risk factor for lung cancer, not " including exposure to secondhand smoke. Other risk factors are having had cancer in the past, emphysema, pulmonary fibrosis, COPD, a family history of lung cancer, or exposure to certain materials such as arsenic, asbestos, beryllium, cadmium, chromium, diesel fumes, nickel, radon or silica. Your care team can help you know if you have one of these risk factors.     Why does it matter if I have symptoms?  Certain symptoms can be a sign that you have a condition in your lungs that should be checked and treated by your doctor. These symptoms include fever, chest pain, a new or changing cough, shortness of breath that you have never felt before, coughing up blood or unexplained weight loss. Having any of these symptoms can greatly affect the results of lung cancer screening.       Should all smokers get an LDCT lung cancer screening exam?  It depends. Lung cancer screening is for a very specific group of men and women who have a history of heavy smoking over a long period of time (see  Who should be screened for lung cancer  above).  I am in the high-risk group, but have been diagnosed with cancer in the past. Is LDCT lung cancer screening right for me?  In some cases, you should not have LDCT lung screening, such as when your doctor is already following your cancer with CT scan studies. Your doctor will help you decide if LDCT lung screening is right for you.  Do I need to have a screening exam every year?  Yes. If you are in the high-risk group described earlier, you should get an LDCT lung cancer screening exam every year until you are 79, or are no longer willing or able to undergo screening and possible procedures to diagnose and treat lung cancer.  How effective is LDCT at preventing death from lung cancer?  Studies have shown that LDCT lung cancer screening can lower the risk of death from lung cancer by 20 percent in people who are at high-risk.  What are the risks?  There are some risks and limitations of LDCT  lung cancer screening. We want to make sure you understand the risks and benefits, so please let us know if you have any questions. Your doctor may want to talk with you more about these risks.    Radiation exposure: As with any exam that uses radiation, there is a very small increased risk of cancer. The amount of radiation in LDCT is small--about the same amount a person would get from a mammogram. Your doctor orders the exam when he or she feels the potential benefits outweigh the risks.    False negatives: No test is perfect, including LDCT. It is possible that you may have a medical condition, including lung cancer, that is not found during your exam. This is called a false negative result.    False positives and more testing: LDCT very often finds something in the lung that could be cancer, but in fact is not. This is called a false positive result. False positive tests often cause anxiety. To make sure these findings are not cancer, you may need to have more tests. These tests will be done only if you give us permission. Sometimes patients need a treatment that can have side effects, such as a biopsy. For more information on false positives, see  What can I expect from the results?     Findings not related to lung cancer: Your LDCT exam also takes pictures of areas of your body next to your lungs. In a very small number of cases, the CT scan will show an abnormal finding in one of these areas, such as your kidneys, adrenal glands, liver or thyroid. This finding may not be serious, but you may need more tests. Your doctor can help you decide what other tests you may need, if any.  What can I expect from the results?  About 1 out of 4 LDCT exams will find something that may need more tests. Most of the time, these findings are lung nodules. Lung nodules are very small collections of tissue in the lung. These nodules are very common, and the vast majority--more than 97 percent--are not cancer (benign). Most are  normal lymph nodes or small areas of scarring from past infections.  But, if a small lung nodule is found to be cancer, the cancer can be cured more than 90 percent of the time. To know if the nodule is cancer, we may need to get more images before your next yearly screening exam. If the nodule has suspicious features (for example, it is large, has an odd shape or grows over time), we will refer you to a specialist for further testing.  Will my doctor also get the results?  Yes. Your doctor will get a copy of your results.  Is it okay to keep smoking now that there s a cancer screening exam?  No. Tobacco is one of the strongest cancer-causing agents. It causes not only lung cancer, but other cancers and cardiovascular (heart) diseases as well. The damage caused by smoking builds over time. This means that the longer you smoke, the higher your risk of disease. While it is never too late to quit, the sooner you quit, the better.  Where can I find help to quit smoking?  The best way to prevent lung cancer is to stop smoking. If you have already quit smoking, congratulations and keep it up! For help on quitting smoking, please call "[employer] at 9-734-334-XIGO ([phone]) or the American [employer] Society at 1-551.533.9073 to find local resources near you.  One-on-one health coaching:  If you d prefer to work individually with a health care provider on tobacco cessation, we offer:      Medication Therapy Management:  Our specially trained pharmacists work closely with you and your doctor to help you quit smoking.  Call [phone] or [phone] (toll free).     Can Do: Health coaching offered by [facility].  [email or web address]  Call for an appointment with Dr. Guerra at your convenience.  . [phone].        Return in about 6 months (around 1/17/2020).    Yenni Sewell MD  [facility]    Lung Cancer Screening Shared Decision Making Visit     Lauri NGUYỄN Mcgee is eligible for lung cancer  screening on the basis of the information provided in my signed lung cancer screening order.     I have discussed with patient the risks and benefits of screening for lung cancer with low-dose CT.     The risks include:  radiation exposure: one low dose chest CT has as much ionizing radiation as about 15 chest x-rays or 6 months of background radiation living in [place]    false positives: 96% of positive findings/nodules are NOT cancer, but some might still require additional diagnostic evaluation, including biopsy  over-diagnosis: some slow growing cancers that might never have been clinically significant will be detected and treated unnecessarily     The benefit of early detection of lung cancer is contingent upon adherence to annual screening or more frequent follow up if indicated.     Furthermore, reaping the benefits of screening requires Lauri Mcgee to be willing and physically able to undergo diagnostic procedures, if indicated. Although no specific guide is available for determining severity of comorbidities, it is reasonable to withhold screening in patients who have greater mortality risk from other diseases.     I did offer risk estimation using a calculator such as this one:    ShouldIScreen

## 2019-07-17 NOTE — PATIENT INSTRUCTIONS

## 2019-07-24 ENCOUNTER — HOSPITAL ENCOUNTER (OUTPATIENT)
Dept: CT IMAGING | Facility: CLINIC | Age: 74
Discharge: HOME | End: 2019-07-24
Attending: INTERNAL MEDICINE | Admitting: INTERNAL MEDICINE
Payer: COMMERCIAL

## 2019-07-24 DIAGNOSIS — Z87.891 PERSONAL HISTORY OF TOBACCO USE: ICD-10-CM

## 2019-07-24 PROCEDURE — G0297 LDCT FOR LUNG CA SCREEN: HCPCS

## 2019-07-25 ENCOUNTER — TELEPHONE (OUTPATIENT)
Dept: PEDIATRICS | Facility: CLINIC | Age: 74
End: 2019-07-25

## 2019-07-25 DIAGNOSIS — R91.8 ABNORMAL CT LUNG SCREENING: ICD-10-CM

## 2019-07-25 NOTE — TELEPHONE ENCOUNTER
[facility] Radiology Lung Cancer Screening CT result notification:     LDCT/Lung Cancer Screening CT Exam date: 7/24/19  Radiologist Impression AND Recommendations:   ACR Assessment Category:  Lung-RADS Category 3. Probably benign  finding(s)- short term follow up suggested with 6 month low dose CT  (please order exam code IMG 2163). Several new nodules compared to  2014.    Pertinent Findings:  Lungs: Regions of bullous emphysematous change again identified,  overall stable in pattern. Some scarring or atelectasis at the right  lower lobe appears stable. No acute airspace disease. Some presumed  subpleural scarring at the anteromedial left upper lobe is again seen,  series 3 image 22. There are multiple new small pulmonary nodules  noted at the bilateral lung bases. Several of these nodules are  clustered together. An example at the left lower lobe is 0.3 cm,  series 6 image 262. Several of these may also be calcified. There is a  stable left fissural nodule that is 0.3 cm, series 6 image 154. Other  stable nodules are noted. New mildly irregular nodule that is 0.4 cm  medial posterior right upper lobe, series 6 image 84. Stable lateral  left lower lobe nodularity with a dominant lesion measuring 0.7 cm,  series 6 image 218.     Ordering Provider: Yenni Sewell MD  Lauri NGUYỄN Mcgee did not receive the remaining radiology results from her provider.   CT Chest order placed to be completed within 6 months (Yes/No):  Yes,  Image schedulers to contact Patient 1 month prior to due date   RN communicated the lung nodule finding to the patient (Yes/No):  No, left message requesting a call back  RN ordered Lung nodule program referral (Yes/No/NA): NA  The patient had the following questions: NA  Correct letter sent as per Lung nodule protocol (Yes/No):  Yes  Did Patient have any CT's of chest previous? (inquire only if no CT's were used for comparison) (Yes/No/NA) NA    If scheduling LDCT only, RN will contact  Image Scheduling Team  Hours available (all sites below):  Mon-Fri 7A to 8P; Sat 7A to 3P.  No schedulers available on Sunday.    [facility] ([place] and [place]): [phone]    [place] ([place], [place], [place]): [phone]    [place] region ([facility]): [phone]    Left voicemail message requesting a call back to [phone] between 10 a.m. and 6:30 p.m. to discuss radiology finding.    [facility] RN  Lung Nodule and ED Lab Result F/u RN  Ph# [phone]

## 2019-07-25 NOTE — TELEPHONE ENCOUNTER
[facility] Radiology Incidental Finding result - Return call    Patient/parent Name  Lauri Mcgee    Reason for call  CT result notification.  Reviewed general information regarding lung nodules.  Prefers to schedule repeat CT scan now, warm transferred to imaging scheduling.    Radiology Result  Lungs: Regions of bullous emphysematous change again identified,  overall stable in pattern. Some scarring or atelectasis at the right  lower lobe appears stable. No acute airspace disease. Some presumed  subpleural scarring at the anteromedial left upper lobe is again seen,  series 3 image 22. There are multiple new small pulmonary nodules  noted at the bilateral lung bases. Several of these nodules are  clustered together. An example at the left lower lobe is 0.3 cm,  series 6 image 262. Several of these may also be calcified. There is a  stable left fissural nodule that is 0.3 cm, series 6 image 154. Other  stable nodules are noted. New mildly irregular nodule that is 0.4 cm  medial posterior right upper lobe, series 6 image 84. Stable lateral  left lower lobe nodularity with a dominant lesion measuring 0.7 cm,  series 6 image 218.  Recommendations/Instructions  ACR Assessment Category:  Lung-RADS Category 3. Probably benign  finding(s)- short term follow up suggested with 6 month low dose CT  (please order exam code IMG 2163). Several new nodules compared to  2014.       Eliana Hernandez RN    [facility]   Lung Nodule and ED Lab Results F/U RN  Epic pool (ED late result f/u RN) : [id number]   # [phone]

## 2019-08-16 ENCOUNTER — TRANSFERRED RECORDS (OUTPATIENT)
Dept: HEALTH INFORMATION MANAGEMENT | Facility: CLINIC | Age: 74
End: 2019-08-16

## 2019-10-14 ENCOUNTER — TRANSFERRED RECORDS (OUTPATIENT)
Dept: HEALTH INFORMATION MANAGEMENT | Facility: CLINIC | Age: 74
End: 2019-10-14

## 2019-11-01 ENCOUNTER — TRANSFERRED RECORDS (OUTPATIENT)
Dept: MULTI SPECIALTY CLINIC | Facility: CLINIC | Age: 74
End: 2019-11-01

## 2019-11-01 LAB — RETINOPATHY: NORMAL

## 2019-11-07 ENCOUNTER — HEALTH MAINTENANCE LETTER (OUTPATIENT)
Age: 74
End: 2019-11-07

## 2019-12-09 ENCOUNTER — TRANSFERRED RECORDS (OUTPATIENT)
Dept: HEALTH INFORMATION MANAGEMENT | Facility: CLINIC | Age: 74
End: 2019-12-09

## 2020-01-13 ENCOUNTER — HOSPITAL ENCOUNTER (OUTPATIENT)
Dept: CT IMAGING | Facility: CLINIC | Age: 75
Discharge: HOME | End: 2020-01-13
Attending: INTERNAL MEDICINE | Admitting: INTERNAL MEDICINE
Payer: COMMERCIAL

## 2020-01-13 DIAGNOSIS — R91.8 ABNORMAL CT LUNG SCREENING: ICD-10-CM

## 2020-01-13 PROCEDURE — 71250 CT THORAX DX C-: CPT

## 2020-01-20 ENCOUNTER — OFFICE VISIT (OUTPATIENT)
Dept: PEDIATRICS | Facility: CLINIC | Age: 75
End: 2020-01-20
Payer: COMMERCIAL

## 2020-01-20 VITALS
HEIGHT: 74 IN | RESPIRATION RATE: 14 BRPM | SYSTOLIC BLOOD PRESSURE: 124 MMHG | TEMPERATURE: 97.1 F | OXYGEN SATURATION: 95 % | WEIGHT: 197.8 LBS | HEART RATE: 60 BPM | BODY MASS INDEX: 25.38 KG/M2 | DIASTOLIC BLOOD PRESSURE: 64 MMHG

## 2020-01-20 DIAGNOSIS — Z12.5 SCREENING FOR PROSTATE CANCER: ICD-10-CM

## 2020-01-20 DIAGNOSIS — Z79.4 TYPE 2 DIABETES MELLITUS WITH STAGE 2 CHRONIC KIDNEY DISEASE, WITH LONG-TERM CURRENT USE OF INSULIN (H): ICD-10-CM

## 2020-01-20 DIAGNOSIS — N18.2 TYPE 2 DIABETES MELLITUS WITH STAGE 2 CHRONIC KIDNEY DISEASE, WITH LONG-TERM CURRENT USE OF INSULIN (H): ICD-10-CM

## 2020-01-20 DIAGNOSIS — E11.22 TYPE 2 DIABETES MELLITUS WITH STAGE 2 CHRONIC KIDNEY DISEASE, WITH LONG-TERM CURRENT USE OF INSULIN (H): ICD-10-CM

## 2020-01-20 DIAGNOSIS — Z00.00 ENCOUNTER FOR MEDICARE ANNUAL WELLNESS EXAM: Primary | ICD-10-CM

## 2020-01-20 DIAGNOSIS — J44.9 CHRONIC OBSTRUCTIVE PULMONARY DISEASE, UNSPECIFIED COPD TYPE (H): ICD-10-CM

## 2020-01-20 LAB
ANION GAP SERPL CALCULATED.3IONS-SCNC: 5 MMOL/L (ref 3–14)
BUN SERPL-MCNC: 24 MG/DL (ref 7–30)
CALCIUM SERPL-MCNC: 10.3 MG/DL (ref 8.5–10.1)
CHLORIDE SERPL-SCNC: 106 MMOL/L (ref 94–109)
CO2 SERPL-SCNC: 27 MMOL/L (ref 20–32)
CREAT SERPL-MCNC: 1.11 MG/DL (ref 0.66–1.25)
CREAT UR-MCNC: 146 MG/DL
GFR SERPL CREATININE-BSD FRML MDRD: 64 ML/MIN/{1.73_M2}
GLUCOSE SERPL-MCNC: 105 MG/DL (ref 70–99)
HBA1C MFR BLD: 5.9 % (ref 0–5.6)
MICROALBUMIN UR-MCNC: 15 MG/L
MICROALBUMIN/CREAT UR: 10 MG/G CR (ref 0–17)
POTASSIUM SERPL-SCNC: 4.3 MMOL/L (ref 3.4–5.3)
PSA SERPL-ACNC: 0.61 UG/L (ref 0–4)
SODIUM SERPL-SCNC: 138 MMOL/L (ref 133–144)

## 2020-01-20 PROCEDURE — 83036 HEMOGLOBIN GLYCOSYLATED A1C: CPT | Performed by: INTERNAL MEDICINE

## 2020-01-20 PROCEDURE — G0103 PSA SCREENING: HCPCS | Performed by: INTERNAL MEDICINE

## 2020-01-20 PROCEDURE — 99397 PER PM REEVAL EST PAT 65+ YR: CPT | Performed by: INTERNAL MEDICINE

## 2020-01-20 PROCEDURE — 36415 COLL VENOUS BLD VENIPUNCTURE: CPT | Performed by: INTERNAL MEDICINE

## 2020-01-20 PROCEDURE — 80048 BASIC METABOLIC PNL TOTAL CA: CPT | Performed by: INTERNAL MEDICINE

## 2020-01-20 PROCEDURE — 99207 C FOOT EXAM  NO CHARGE: CPT | Mod: 25 | Performed by: INTERNAL MEDICINE

## 2020-01-20 PROCEDURE — 82043 UR ALBUMIN QUANTITATIVE: CPT | Performed by: INTERNAL MEDICINE

## 2020-01-20 SDOH — HEALTH STABILITY: MENTAL HEALTH
STRESS IS WHEN SOMEONE FEELS TENSE, NERVOUS, ANXIOUS, OR CAN'T SLEEP AT NIGHT BECAUSE THEIR MIND IS TROUBLED. HOW STRESSED ARE YOU?: NOT AT ALL

## 2020-01-20 SDOH — HEALTH STABILITY: MENTAL HEALTH: HOW MANY STANDARD DRINKS CONTAINING ALCOHOL DO YOU HAVE ON A TYPICAL DAY?: PATIENT DECLINED

## 2020-01-20 SDOH — ECONOMIC STABILITY: TRANSPORTATION INSECURITY
IN THE PAST 12 MONTHS, HAS LACK OF TRANSPORTATION KEPT YOU FROM MEETINGS, WORK, OR FROM GETTING THINGS NEEDED FOR DAILY LIVING?: NO

## 2020-01-20 SDOH — HEALTH STABILITY: PHYSICAL HEALTH: ON AVERAGE, HOW MANY DAYS PER WEEK DO YOU ENGAGE IN MODERATE TO STRENUOUS EXERCISE (LIKE A BRISK WALK)?: 2 DAYS

## 2020-01-20 SDOH — SOCIAL STABILITY: SOCIAL NETWORK: ARE YOU MARRIED, WIDOWED, DIVORCED, SEPARATED, NEVER MARRIED, OR LIVING WITH A PARTNER?: NEVER MARRIED

## 2020-01-20 SDOH — HEALTH STABILITY: MENTAL HEALTH: HOW OFTEN DO YOU HAVE 6 OR MORE DRINKS ON ONE OCCASION?: NEVER

## 2020-01-20 SDOH — ECONOMIC STABILITY: TRANSPORTATION INSECURITY
IN THE PAST 12 MONTHS, HAS THE LACK OF TRANSPORTATION KEPT YOU FROM MEDICAL APPOINTMENTS OR FROM GETTING MEDICATIONS?: NO

## 2020-01-20 SDOH — HEALTH STABILITY: PHYSICAL HEALTH: ON AVERAGE, HOW MANY MINUTES DO YOU ENGAGE IN EXERCISE AT THIS LEVEL?: 150+ MIN

## 2020-01-20 SDOH — ECONOMIC STABILITY: FOOD INSECURITY: WITHIN THE PAST 12 MONTHS, YOU WORRIED THAT YOUR FOOD WOULD RUN OUT BEFORE YOU GOT MONEY TO BUY MORE.: NEVER TRUE

## 2020-01-20 SDOH — ECONOMIC STABILITY: INCOME INSECURITY: HOW HARD IS IT FOR YOU TO PAY FOR THE VERY BASICS LIKE FOOD, HOUSING, MEDICAL CARE, AND HEATING?: NOT HARD AT ALL

## 2020-01-20 SDOH — SOCIAL STABILITY: SOCIAL NETWORK: HOW OFTEN DO YOU GET TOGETHER WITH FRIENDS OR RELATIVES?: TWICE A WEEK

## 2020-01-20 SDOH — HEALTH STABILITY: MENTAL HEALTH: HOW OFTEN DO YOU HAVE A DRINK CONTAINING ALCOHOL?: NEVER

## 2020-01-20 SDOH — SOCIAL STABILITY: SOCIAL NETWORK: HOW OFTEN DO YOU ATTEND CHURCH OR RELIGIOUS SERVICES?: 1 TO 4 TIMES PER YEAR

## 2020-01-20 SDOH — SOCIAL STABILITY: SOCIAL NETWORK: IN A TYPICAL WEEK, HOW MANY TIMES DO YOU TALK ON THE PHONE WITH FAMILY, FRIENDS, OR NEIGHBORS?: TWICE A WEEK

## 2020-01-20 SDOH — SOCIAL STABILITY: SOCIAL NETWORK
DO YOU BELONG TO ANY CLUBS OR ORGANIZATIONS SUCH AS CHURCH GROUPS UNIONS, FRATERNAL OR ATHLETIC GROUPS, OR SCHOOL GROUPS?: YES

## 2020-01-20 SDOH — SOCIAL STABILITY: SOCIAL NETWORK: HOW OFTEN DO YOU ATTENT MEETINGS OF THE CLUB OR ORGANIZATION YOU BELONG TO?: MORE THAN 4 TIMES PER YEAR

## 2020-01-20 SDOH — ECONOMIC STABILITY: FOOD INSECURITY: WITHIN THE PAST 12 MONTHS, THE FOOD YOU BOUGHT JUST DIDN'T LAST AND YOU DIDN'T HAVE MONEY TO GET MORE.: NEVER TRUE

## 2020-01-20 ASSESSMENT — ACTIVITIES OF DAILY LIVING (ADL): CURRENT_FUNCTION: NO ASSISTANCE NEEDED

## 2020-01-20 ASSESSMENT — ENCOUNTER SYMPTOMS
HEARTBURN: 0
MYALGIAS: 0
HEADACHES: 0
WEAKNESS: 0
DIARRHEA: 1
ABDOMINAL PAIN: 0
PALPITATIONS: 0
CONSTIPATION: 0
FREQUENCY: 0
SHORTNESS OF BREATH: 0
HEMATOCHEZIA: 0
FEVER: 0
JOINT SWELLING: 0
DYSURIA: 0
CHILLS: 0
SORE THROAT: 0
EYE PAIN: 0
HEMATURIA: 0
NERVOUS/ANXIOUS: 0
DIZZINESS: 0
PARESTHESIAS: 0
ARTHRALGIAS: 0
NAUSEA: 0
COUGH: 0

## 2020-01-20 ASSESSMENT — MIFFLIN-ST. JEOR: SCORE: 1703.45

## 2020-01-20 NOTE — PROGRESS NOTES
"SUBJECTIVE:   Lauri Mcgee is a 74 year old male who presents for Preventive Visit.    Are you in the first 12 months of your Medicare coverage?  No    Healthy Habits:     In general, how would you rate your overall health?  Good    Frequency of exercise:  2-3 days/week    Duration of exercise:  15-30 minutes    Do you usually eat at least 4 servings of fruit and vegetables a day, include whole grains    & fiber and avoid regularly eating high fat or \"junk\" foods?  No    Taking medications regularly:  Yes    Medication side effects:  Other    Ability to successfully perform activities of daily living:  No assistance needed    Home Safety:  Throw rugs in the hallway and lack of grab bars in the bathroom    Hearing Impairment:  Difficulty following a conversation in a noisy restaurant or crowded room and no hearing concerns    In the past 6 months, have you been bothered by leaking of urine?  No    In general, how would you rate your overall mental or emotional health?  Good      PHQ-2 Total Score: 0    Additional concerns today:  Yes    Do you feel safe in your environment? Yes    Have you ever done Advance Care Planning? (For example, a Health Directive, POLST, or a discussion with a medical provider or your loved ones about your wishes): Yes, advance care planning is on file.      Fall risk  Fallen 2 or more times in the past year?: No  Any fall with injury in the past year?: No    Cognitive Screening   1) Repeat 3 items (Leader, Season, Table)    2) Clock draw: NORMAL  3) 3 item recall: Recalls 2 objects   Results: NORMAL clock, 1-2 items recalled: COGNITIVE IMPAIRMENT LESS LIKELY    Mini-CogTM Copyright MATILDA Farah. Licensed by the author for use in [facility]; reprinted with permission ([email or web address]@.[email or web address]). All rights reserved.      Do you have sleep apnea, excessive snoring or daytime drowsiness?: no    Reviewed and updated as needed this visit by clinical staff  Tobacco  Allergies  Med Hx  Surg Hx  " Fam Hx  Soc Hx        Reviewed and updated as needed this visit by Provider        Social History     Tobacco Use     Smoking status: Former Smoker     Packs/day: 0.50     Years: 47.00     Pack years: 23.50     Types: Cigarettes     Last attempt to quit: 10/1/2013     Years since quittin.3     Smokeless tobacco: Never Used     Tobacco comment: occ   Substance Use Topics     Alcohol use: No     Alcohol/week: 0.0 standard drinks     Frequency: Never     Drinks per session: Patient refused     Binge frequency: Never         Alcohol Use 2020   Prescreen: >3 drinks/day or >7 drinks/week? No   Prescreen: >3 drinks/day or >7 drinks/week? -           Diabetes Follow-up    How often are you checking your blood sugar? One time daily  What time of day are you checking your blood sugars (select all that apply)?  Before and after meals  Have you had any blood sugars above 200?  No  Have you had any blood sugars below 70?  No    What symptoms do you notice when your blood sugar is low?  None    What concerns do you have today about your diabetes? None     Do you have any of these symptoms? (Select all that apply)  No numbness or tingling in feet.  No redness, sores or blisters on feet.  No complaints of excessive thirst.  No reports of blurry vision.  No significant changes to weight.    Have you had a diabetic eye exam in the last 12 months? Yes-  Location: 2019      Taking generic tums since he stopped his ranitidine due to contamination concerns. Has taken famotidine prn but gets diarrhea for a few hours after. Has prevacid at home, that does fine. Last night had     Hyperlipidemia Follow-Up      Are you regularly taking any medication or supplement to lower your cholesterol?   Yes- atorvastatin    Are you having muscle aches or other side effects that you think could be caused by your cholesterol lowering medication?  No    Hypertension Follow-up      Do you check your blood pressure regularly outside of the  clinic? No     Are you following a low salt diet? No    Are your blood pressures ever more than 140 on the top number (systolic) OR more   than 90 on the bottom number (diastolic), for example 140/90? No    BP Readings from Last 2 Encounters:   01/20/20 124/64   07/17/19 120/52     Hemoglobin A1C (%)   Date Value   07/17/2019 5.5   01/14/2019 5.8 (H)     LDL Cholesterol Calculated (mg/dL)   Date Value   07/17/2019 54   04/13/2018 64       COPD Follow-Up    Overall, how are your COPD symptoms since your last clinic visit?  No change    How much fatigue or shortness of breath do you have when you are walking?  Same as usual    How much shortness of breath do you have when you are resting?  None    How often do you cough? Rarely    Have you noticed any change in your sputum/phlegm?  No    Have you experienced a recent fever? No    Please describe how far you can walk without stopping to rest:  1-2 miles    How many flights of stairs are you able to walk up without stopping?  3 or more    Have you had any Emergency Room Visits, Urgent Care Visits, or Hospital Admissions because of your COPD since your last office visit?  No    History   Smoking Status     Former Smoker     Packs/day: 0.50     Years: 47.00     Types: Cigarettes     Quit date: 10/1/2013   Smokeless Tobacco     Never Used     Comment: occ     No results found for: FEV1, [id number]      Current providers sharing in care for this patient include:   Patient Care Team:  Yenni Sewell MD as PCP - General  Yenni Sewell MD as Assigned PCP  Elva Nguyễn RD as Diabetes Educator (Dietitian, Registered)  Jose D Campoverde MD as MD (Internal Medicine)  Jerome Rasheed MD as MD (Urology)  Maci Reynolds (Physician Assistant)  Abby Guerra (Dermatology)  David Valenzuela    The following health maintenance items are reviewed in Epic and correct as of today:  Health Maintenance   Topic Date Due     ANNUAL REVIEW OF  ORDERS   1945     CT CHEST W/O CONTRAST  06/24/2015     EYE EXAM  11/27/2018     PSA  10/25/2019     PHQ-2  01/01/2020     MICROALBUMIN  01/14/2020     DIABETIC FOOT EXAM  01/14/2020     FALL RISK ASSESSMENT  01/14/2020     A1C  01/17/2020     MEDICARE ANNUAL WELLNESS VISIT  01/14/2020     BMP  02/21/2020     LIPID  07/17/2020     LUNG CANCER SCREENING ANNUAL  01/13/2021     TSH W/FREE T4 REFLEX  02/21/2021     ADVANCE CARE PLANNING  09/13/2021     COLONOSCOPY  11/22/2022     DTAP/TDAP/TD IMMUNIZATION (3 - Td) 04/08/2023     SPIROMETRY  Completed     COPD ACTION PLAN  Completed     INFLUENZA VACCINE  Completed     PNEUMOCOCCAL IMMUNIZATION 65+ LOW/MEDIUM RISK  Completed     ZOSTER IMMUNIZATION  Completed     IPV IMMUNIZATION  Aged Out     MENINGITIS IMMUNIZATION  Aged Out     Labs reviewed in EPIC  BP Readings from Last 3 Encounters:   01/20/20 124/64   07/17/19 120/52   01/14/19 120/60    Wt Readings from Last 3 Encounters:   01/20/20 89.7 kg (197 lb 12.8 oz)   07/17/19 89.8 kg (197 lb 14.4 oz)   01/14/19 87.7 kg (193 lb 6.4 oz)                    Review of Systems   Constitutional: Negative for chills and fever.   HENT: Negative for congestion, ear pain, hearing loss and sore throat.    Eyes: Negative for pain and visual disturbance.   Respiratory: Negative for cough and shortness of breath.    Cardiovascular: Positive for peripheral edema. Negative for chest pain and palpitations.   Gastrointestinal: Positive for diarrhea. Negative for abdominal pain, constipation, heartburn, hematochezia and nausea.   Genitourinary: Negative for discharge, dysuria, frequency, genital sores, hematuria, impotence and urgency.   Musculoskeletal: Negative for arthralgias, joint swelling and myalgias.   Skin: Negative for rash.   Neurological: Negative for dizziness, weakness, headaches and paresthesias.   Psychiatric/Behavioral: Negative for mood changes. The patient is not nervous/anxious.          OBJECTIVE:   /64 (Cuff Size:  "Adult Large)   Pulse 60   Temp 97.1  F (36.2  C) (Tympanic)   Resp 14   Ht 1.89 m (6' 2.41\")   Wt 89.7 kg (197 lb 12.8 oz)   SpO2 95%   BMI 25.12 kg/m   Estimated body mass index is 25.12 kg/m  as calculated from the following:    Height as of this encounter: 1.89 m (6' 2.41\").    Weight as of this encounter: 89.7 kg (197 lb 12.8 oz).  Physical Exam  GENERAL: healthy, alert and no distress  NECK: no adenopathy, no asymmetry, masses, or scars and thyroid normal to palpation  RESP: lungs clear to auscultation - no rales, rhonchi or wheezes  CV: regular rate and rhythm, normal S1 S2, no S3 or S4, no murmur, click or rub, no peripheral edema and peripheral pulses strong  ABDOMEN: soft, nontender, no hepatosplenomegaly, no masses and bowel sounds normal  MS: no gross musculoskeletal defects noted, no edema  Diabetic foot exam: normal DP and PT pulses, no trophic changes or ulcerative lesions, normal sensory exam and normal monofilament exam    Diagnostic Test Results:  Labs reviewed in Epic    ASSESSMENT / PLAN:   1. Encounter for Medicare annual wellness exam      2. Chronic obstructive pulmonary disease, unspecified COPD type (H)      3. Type 2 diabetes mellitus with stage 2 chronic kidney disease, with long-term current use of insulin (H)  Doing well.  - FOOT EXAM  - Hemoglobin A1c  - Albumin Random Urine Quantitative with Creat Ratio  - Basic metabolic panel  (Ca, Cl, CO2, Creat, Gluc, K, Na, BUN)  - **A1C FUTURE anytime; Future  - Comprehensive metabolic panel (BMP + Alb, Alk Phos, ALT, AST, Total. Bili, TP); Future  - Lipid panel reflex to direct LDL Fasting; Future    4. Screening for prostate cancer    - PSA, screen    COUNSELING:  Reviewed preventive health counseling, as reflected in patient instructions    Estimated body mass index is 25.12 kg/m  as calculated from the following:    Height as of this encounter: 1.89 m (6' 2.41\").    Weight as of this encounter: 89.7 kg (197 lb 12.8 oz).         reports " that he quit smoking about 6 years ago. His smoking use included cigarettes. He has a 23.50 pack-year smoking history. He has never used smokeless tobacco.      Appropriate preventive services were discussed with this patient, including applicable screening as appropriate for cardiovascular disease, diabetes, osteopenia/osteoporosis, and glaucoma.  As appropriate for age/gender, discussed screening for colorectal cancer, prostate cancer, breast cancer, and cervical cancer. Checklist reviewing preventive services available has been given to the patient.    Reviewed patients plan of care and provided an AVS. The Intermediate Care Plan ( asthma action plan, low back pain action plan, and migraine action plan) for Lauri meets the Care Plan requirement. This Care Plan has been established and reviewed with the Patient.    Counseling Resources:  ATP IV Guidelines  Pooled Cohorts Equation Calculator  Breast Cancer Risk Calculator  FRAX Risk Assessment  ICSI Preventive Guidelines  Dietary Guidelines for Americans, 2010  USDA's MyPlate  ASA Prophylaxis  Lung CA Screening    Yenni Sewell MD  [facility]    Identified Health Risks:    The patient was counseled and encouraged to consider modifying their diet and eating habits. He was provided with information on recommended healthy diet options.  The patient was provided with written information regarding signs of hearing loss.

## 2020-01-20 NOTE — PATIENT INSTRUCTIONS
Patient Education   Personalized Prevention Plan  You are due for the preventive services outlined below.  Your care team is available to assist you in scheduling these services.  If you have already completed any of these items, please share that information with your care team to update in your medical record.  Health Maintenance Due   Topic Date Due     ANNUAL REVIEW OF HM ORDERS  1945     CT Lungs  06/24/2015     Eye Exam  11/27/2018     Prostate Test  10/25/2019     PHQ-2  01/01/2020     Kidney Microalbumin Urine Test  01/14/2020     Diabetic Foot Exam  01/14/2020     FALL RISK ASSESSMENT  01/14/2020     A1C Lab  01/17/2020     Annual Wellness Visit  01/14/2020       Understanding [employer] MyPlate  The USDA (U.S. Department of Agriculture) has guidelines to help you make healthy food choices. These are called MyPlate. MyPlate shows the food groups that make up healthy meals using the image of a place setting. Before you eat, think about the healthiest choices for what to put onto your plate or into your cup or bowl. To learn more about building a healthy plate, visit www.choosemyplate.gov.    The food groups    Fruits. Any fruit or 100% fruit juice counts as part of the Fruit Group. Fruits may be fresh, canned, frozen, or dried, and may be whole, cut-up, or pureed. Make half your plate fruits and vegetables.    Vegetables. Any vegetable or 100% vegetable juice counts as a member of the Vegetable Group. Vegetables may be fresh, frozen, canned, or dried. They can be served raw or cooked and may be whole, cut-up, or mashed. Make half your plate fruits and vegetables.    Grains. All foods made from grains are part of the Grains Group. These include wheat, rice, oats, cornmeal, and barley such as bread, pasta, oatmeal, cereal, tortillas, and grits. Grains should be no more than a quarter of your plate. At least half of your grains should be whole grains.    Protein. This group includes meat, poultry, seafood,  beans and peas, eggs, processed soy products (like tofu), nuts (including nut butters), and seeds. Make protein choices no more than a quarter of your plate. Meat and poultry choices should be lean or low fat.    Dairy. All fluid milk products and foods made from milk that contain calcium, like yogurt and cheese, are part of the Dairy Group. (Foods that have little calcium, such as cream, butter, and cream cheese, are not part of the group.) Most dairy choices should be low-fat or fat-free.    Oils. These are fats that are liquid at room temperature. They include canola, corn, olive, soybean, and sunflower oil. Foods that are mainly oil include mayonnaise, certain salad dressings, and soft margarines. You should have only 5 to 7 teaspoons of oils a day. You probably already get this much from the food you eat.  Date Last Reviewed: 8/1/2017 2000-2019 [employer]. [address] [place] [address], [place] [address]. All rights reserved. This information is not intended as a substitute for professional medical care. Always follow your healthcare professional's instructions.        OK prevacid as needed.     Let me know what dose you are taking of this. It's stronger than zantac (ranitidine) so better if you don't have to take it every day, but can take before meals that typically trigger your symptoms. Goal is heartburn <2 x a week.

## 2020-02-10 ENCOUNTER — TRANSFERRED RECORDS (OUTPATIENT)
Dept: HEALTH INFORMATION MANAGEMENT | Facility: CLINIC | Age: 75
End: 2020-02-10

## 2020-03-09 ENCOUNTER — TRANSFERRED RECORDS (OUTPATIENT)
Dept: HEALTH INFORMATION MANAGEMENT | Facility: CLINIC | Age: 75
End: 2020-03-09

## 2020-06-10 ENCOUNTER — TRANSFERRED RECORDS (OUTPATIENT)
Dept: HEALTH INFORMATION MANAGEMENT | Facility: CLINIC | Age: 75
End: 2020-06-10

## 2020-06-10 LAB
ALT SERPL-CCNC: 22 IU/L (ref 5–40)
AST SERPL-CCNC: 20 U/L (ref 5–34)
CREAT SERPL-MCNC: 1.01 MG/DL (ref 0.5–1.3)

## 2020-07-13 ENCOUNTER — VIRTUAL VISIT (OUTPATIENT)
Dept: PEDIATRICS | Facility: CLINIC | Age: 75
End: 2020-07-13
Payer: COMMERCIAL

## 2020-07-13 DIAGNOSIS — J44.9 CHRONIC OBSTRUCTIVE PULMONARY DISEASE, UNSPECIFIED COPD TYPE (H): ICD-10-CM

## 2020-07-13 DIAGNOSIS — E11.22 TYPE 2 DIABETES MELLITUS WITH STAGE 2 CHRONIC KIDNEY DISEASE, WITH LONG-TERM CURRENT USE OF INSULIN (H): ICD-10-CM

## 2020-07-13 DIAGNOSIS — Z13.6 CARDIOVASCULAR SCREENING; LDL GOAL LESS THAN 130: ICD-10-CM

## 2020-07-13 DIAGNOSIS — K68.2 RETROPERITONEAL FIBROSIS: ICD-10-CM

## 2020-07-13 DIAGNOSIS — R91.8 ABNORMAL CT LUNG SCREENING: ICD-10-CM

## 2020-07-13 DIAGNOSIS — Z79.4 TYPE 2 DIABETES MELLITUS WITH STAGE 2 CHRONIC KIDNEY DISEASE, WITH LONG-TERM CURRENT USE OF INSULIN (H): ICD-10-CM

## 2020-07-13 DIAGNOSIS — Z20.822 EXPOSURE TO COVID-19 VIRUS: Primary | ICD-10-CM

## 2020-07-13 DIAGNOSIS — N18.2 TYPE 2 DIABETES MELLITUS WITH STAGE 2 CHRONIC KIDNEY DISEASE, WITH LONG-TERM CURRENT USE OF INSULIN (H): ICD-10-CM

## 2020-07-13 PROCEDURE — 99215 OFFICE O/P EST HI 40 MIN: CPT | Mod: GT | Performed by: INTERNAL MEDICINE

## 2020-07-13 RX ORDER — METFORMIN HCL 500 MG
1000 TABLET, EXTENDED RELEASE 24 HR ORAL 2 TIMES DAILY WITH MEALS
Qty: 360 TABLET | Refills: 11 | Status: SHIPPED | OUTPATIENT
Start: 2020-07-13 | End: 2021-06-30

## 2020-07-13 RX ORDER — FLUTICASONE FUROATE 100 UG/1
1 POWDER RESPIRATORY (INHALATION) DAILY
COMMUNITY
Start: 2020-06-12 | End: 2022-07-27

## 2020-07-13 RX ORDER — ATORVASTATIN CALCIUM 10 MG/1
10 TABLET, FILM COATED ORAL DAILY
Qty: 90 TABLET | Refills: 11 | Status: SHIPPED | OUTPATIENT
Start: 2020-07-13 | End: 2021-06-30

## 2020-07-13 RX ORDER — PREDNISONE 5 MG/1
2.5 TABLET ORAL
COMMUNITY
Start: 2020-04-09

## 2020-07-13 RX ORDER — LISINOPRIL 2.5 MG/1
2.5 TABLET ORAL DAILY
Qty: 90 TABLET | Refills: 11 | Status: SHIPPED | OUTPATIENT
Start: 2020-07-13 | End: 2021-06-30

## 2020-07-13 NOTE — PROGRESS NOTES
"Lauri Mcgee is a 74 year old male who is being evaluated via a billable video visit.      The patient has been notified of following:     \"This video visit will be conducted via a call between you and your physician/provider. We have found that certain health care needs can be provided without the need for an in-person physical exam.  This service lets us provide the care you need with a video conversation.  If a prescription is necessary we can send it directly to your pharmacy.  If lab work is needed we can place an order for that and you can then stop by our lab to have the test done at a later time.    Video visits are billed at different rates depending on your insurance coverage.  Please reach out to your insurance provider with any questions.    If during the course of the call the physician/provider feels a video visit is not appropriate, you will not be charged for this service.\"    Patient has given verbal consent for Video visit? Yes  How would you like to obtain your AVS? Scott  Patient would like the video invitation sent by: scott  Will anyone else be joining your video visit? No    Subjective     Luari Mcgee is a 74 year old male who presents today via video visit for the following health issues:    Brother tested positive for coronavirus. Last time he saw him was 4-5 days ago, brother got positive test results the day after. Was on day 8 of symptoms when he saw him. His brother is doing OK. Played 18 holes of golf. They had separate carts. Were mostly well more than 6 feet apart.       HPI  Diabetes Follow-up    How often are you checking your blood sugar? One time daily  What time of day are you checking your blood sugars (select all that apply)?  Before meals, After meals and At bedtime  Have you had any blood sugars above 200?  Yes 1 time may 31st   219 after a meal  Have you had any blood sugars below 70?  No    What symptoms do you notice when your blood sugar is low?  None    What " concerns do you have today about your diabetes? None     Do you have any of these symptoms? (Select all that apply)  Numbness in feet      Hyperlipidemia Follow-Up      Are you regularly taking any medication or supplement to lower your cholesterol?   Yes- lipitor    Are you having muscle aches or other side effects that you think could be caused by your cholesterol lowering medication?  No    Hypertension Follow-up      Do you check your blood pressure regularly outside of the clinic? No     Are you following a low salt diet? No, no added salt    Are your blood pressures ever more than 140 on the top number (systolic) OR more   than 90 on the bottom number (diastolic), for example 140/90? Not taking    BP Readings from Last 2 Encounters:   01/20/20 124/64   07/17/19 120/52     Hemoglobin A1C (%)   Date Value   01/20/2020 5.9 (H)   07/17/2019 5.5     LDL Cholesterol Calculated (mg/dL)   Date Value   07/17/2019 54   04/13/2018 64         How many servings of fruits and vegetables do you eat daily?  2-3    On average, how many sweetened beverages do you drink each day (Examples: soda, juice, sweet tea, etc.  Do NOT count diet or artificially sweetened beverages)?   0    How many days per week do you exercise enough to make your heart beat faster? [place] daily How many minutes a day do you exercise enough to make your heart beat faster? 10 - 19    How many days per week do you miss taking your medication? 0    Video Start Time: 0920    Wondering when he is due for repeat lung CT.      Patient Active Problem List   Diagnosis     Retroperitoneal fibrosis     Gastroesophageal reflux disease without esophagitis     CARDIOVASCULAR SCREENING; LDL GOAL LESS THAN 130     Advanced directives, counseling/discussion     Tubular adenoma of colon     COPD (chronic obstructive pulmonary disease) (H)     Type 2 diabetes mellitus with stage 1 chronic kidney disease, with long-term current use of insulin (H)     Abnormal CT lung  screening     Past Surgical History:   Procedure Laterality Date     C NONSPECIFIC PROCEDURE      surgery for herniated disk     CARDIAC NUC ALEX STRESS TEST NL  06    normal cardiolyte test     TONSILLECTOMY         Social History     Tobacco Use     Smoking status: Former Smoker     Packs/day: 0.50     Years: 47.00     Pack years: 23.50     Types: Cigarettes     Last attempt to quit: 10/1/2013     Years since quittin.7     Smokeless tobacco: Never Used     Tobacco comment: occ   Substance Use Topics     Alcohol use: No     Alcohol/week: 0.0 standard drinks     Frequency: Never     Drinks per session: Patient refused     Binge frequency: Never     Family History   Problem Relation Age of Onset     Cerebrovascular Disease Father      Heart Disease Father      Coronary Artery Disease Father      Circulatory Mother         anurism     Diabetes Type 2  Brother      Diabetes Brother      Colon Cancer Brother      Substance Abuse Brother      Diabetes Type 2  Sister      Diabetes Sister      Diabetes Type 2  Paternal Grandfather      Diabetes Paternal Grandfather      Cerebrovascular Disease Paternal Grandfather      Hypertension Maternal Grandmother      Coronary Artery Disease Maternal Grandfather            Reviewed and updated as needed this visit by Provider         Review of Systems   Constitutional, HEENT, cardiovascular, pulmonary, GI, , musculoskeletal, neuro, skin, endocrine and psych systems are negative, except as otherwise noted.      Objective             Physical Exam     GENERAL: Healthy, alert and no distress  EYES: Eyes grossly normal to inspection.  No discharge or erythema, or obvious scleral/conjunctival abnormalities.  RESP: No audible wheeze, cough, or visible cyanosis.  No visible retractions or increased work of breathing.    SKIN: Visible skin clear. No significant rash, abnormal pigmentation or lesions.  NEURO: Cranial nerves grossly intact.  Mentation and speech appropriate for  age.  PSYCH: Mentation appears normal, affect normal/bright, judgement and insight intact, normal speech and appearance well-groomed.      Diagnostic Test Results:  Labs reviewed in Epic        Assessment & Plan     1. Exposure to COVID-19 virus  Currently asymptomatic. Does seem to be a lower risk interaction he had with his brother. At this point, since Theo is asymptomatic, there is no indication for PCR testing. He could get antibody testing in future if he would like. He was cautioned to let us know right away if he develops symptoms of cough, sore throat, fever, myalgia, any symptoms consistent with COVID-19.    2. Type 2 diabetes mellitus with stage 2 chronic kidney disease, with long-term current use of insulin (H)  Doing well. Continue current regimen.  - **A1C FUTURE anytime; Future  - Lipid panel reflex to direct LDL Fasting; Future  - lisinopril (ZESTRIL) 2.5 MG tablet; Take 1 tablet (2.5 mg) by mouth daily  Dispense: 90 tablet; Refill: 11  - metFORMIN (GLUCOPHAGE-XR) 500 MG 24 hr tablet; Take 2 tablets (1,000 mg) by mouth 2 times daily (with meals)  Dispense: 360 tablet; Refill: 11  - **Comprehensive metabolic panel FUTURE anytime; Future    3. Retroperitoneal fibrosis  Gets labs every 3 months through rheum. Discussed doing them at our clinic in conjunction with diabetes labs to minimize extra trips to a lab facility. He will discuss with Dr. Campoverde. For now, I will order his usual rheum labs.   - **Comprehensive metabolic panel FUTURE anytime; Future  - **CBC with platelets differential FUTURE 2mo; Future    4. Chronic obstructive pulmonary disease, unspecified COPD type (H)  Doing well. Updated current meds, changed due to insurance    5. CARDIOVASCULAR SCREENING; LDL GOAL LESS THAN 130    - atorvastatin (LIPITOR) 10 MG tablet; Take 1 tablet (10 mg) by mouth daily  Dispense: 90 tablet; Refill: 11    6. Abnormal CT lung screening  Due for ct in January 2021.       BMI:   Estimated body mass index is  "25.12 kg/m  as calculated from the following:    Height as of 1/20/20: 1.89 m (6' 2.41\").    Weight as of 1/20/20: 89.7 kg (197 lb 12.8 oz).           Regular exercise    No follow-ups on file.    Yenni Sewell MD  [facility]      Video-Visit Details    Type of service:  Video Visit    Video End Time:10:03 AM    Originating Location (pt. Location): Home    Distant Location (provider location):  [facility]     Platform used for Video Visit: [employer]    No follow-ups on file.       Yenni Sewell MD      "

## 2020-07-13 NOTE — PATIENT INSTRUCTIONS
Mr. Mcgee,    Great to talk with you today.    Your CT scan in January I ordered and it looked good, no change from the one last summer. The recommendations from that scan are to repeat another CT scan in January 2021.    Orders are in for fasting lab in the next 3 months or so.    Check with Dr. Campoverde about doing your labs at same time we do ours.     Let's keep your medications the same.    Yenni Sewell M.D.

## 2020-09-22 DIAGNOSIS — N18.2 TYPE 2 DIABETES MELLITUS WITH STAGE 2 CHRONIC KIDNEY DISEASE, WITH LONG-TERM CURRENT USE OF INSULIN (H): ICD-10-CM

## 2020-09-22 DIAGNOSIS — K68.2 RETROPERITONEAL FIBROSIS: ICD-10-CM

## 2020-09-22 DIAGNOSIS — E11.22 TYPE 2 DIABETES MELLITUS WITH STAGE 2 CHRONIC KIDNEY DISEASE, WITH LONG-TERM CURRENT USE OF INSULIN (H): ICD-10-CM

## 2020-09-22 DIAGNOSIS — Z79.4 TYPE 2 DIABETES MELLITUS WITH STAGE 2 CHRONIC KIDNEY DISEASE, WITH LONG-TERM CURRENT USE OF INSULIN (H): ICD-10-CM

## 2020-09-22 DIAGNOSIS — Z79.899 ENCOUNTER FOR LONG-TERM (CURRENT) USE OF OTHER MEDICATIONS: ICD-10-CM

## 2020-09-22 LAB
ALBUMIN SERPL-MCNC: 3.6 G/DL (ref 3.4–5)
ALP SERPL-CCNC: 46 U/L (ref 40–150)
ALT SERPL W P-5'-P-CCNC: 24 U/L (ref 0–70)
ANION GAP SERPL CALCULATED.3IONS-SCNC: 5 MMOL/L (ref 3–14)
AST SERPL W P-5'-P-CCNC: 17 U/L (ref 0–45)
BASOPHILS # BLD AUTO: 0 10E9/L (ref 0–0.2)
BASOPHILS NFR BLD AUTO: 0.5 %
BILIRUB SERPL-MCNC: 0.2 MG/DL (ref 0.2–1.3)
BUN SERPL-MCNC: 21 MG/DL (ref 7–30)
CALCIUM SERPL-MCNC: 9.8 MG/DL (ref 8.5–10.1)
CHLORIDE SERPL-SCNC: 105 MMOL/L (ref 94–109)
CHOLEST SERPL-MCNC: 109 MG/DL
CO2 SERPL-SCNC: 29 MMOL/L (ref 20–32)
CREAT SERPL-MCNC: 1.03 MG/DL (ref 0.66–1.25)
DIFFERENTIAL METHOD BLD: ABNORMAL
EOSINOPHIL # BLD AUTO: 0.2 10E9/L (ref 0–0.7)
EOSINOPHIL NFR BLD AUTO: 3.1 %
ERYTHROCYTE [DISTWIDTH] IN BLOOD BY AUTOMATED COUNT: 13.1 % (ref 10–15)
GFR SERPL CREATININE-BSD FRML MDRD: 70 ML/MIN/{1.73_M2}
GLUCOSE SERPL-MCNC: 99 MG/DL (ref 70–99)
HBA1C MFR BLD: 5.7 % (ref 0–5.6)
HCT VFR BLD AUTO: 40.5 % (ref 40–53)
HDLC SERPL-MCNC: 49 MG/DL
HGB BLD-MCNC: 13.1 G/DL (ref 13.3–17.7)
LDLC SERPL CALC-MCNC: 44 MG/DL
LYMPHOCYTES # BLD AUTO: 0.9 10E9/L (ref 0.8–5.3)
LYMPHOCYTES NFR BLD AUTO: 14.2 %
MCH RBC QN AUTO: 32.8 PG (ref 26.5–33)
MCHC RBC AUTO-ENTMCNC: 32.3 G/DL (ref 31.5–36.5)
MCV RBC AUTO: 101 FL (ref 78–100)
MONOCYTES # BLD AUTO: 1 10E9/L (ref 0–1.3)
MONOCYTES NFR BLD AUTO: 15.5 %
NEUTROPHILS # BLD AUTO: 4.1 10E9/L (ref 1.6–8.3)
NEUTROPHILS NFR BLD AUTO: 66.7 %
NONHDLC SERPL-MCNC: 60 MG/DL
PLATELET # BLD AUTO: 211 10E9/L (ref 150–450)
POTASSIUM SERPL-SCNC: 4.2 MMOL/L (ref 3.4–5.3)
PROT SERPL-MCNC: 6.6 G/DL (ref 6.8–8.8)
RBC # BLD AUTO: 4 10E12/L (ref 4.4–5.9)
SODIUM SERPL-SCNC: 139 MMOL/L (ref 133–144)
TRIGL SERPL-MCNC: 79 MG/DL
WBC # BLD AUTO: 6.1 10E9/L (ref 4–11)

## 2020-09-22 PROCEDURE — 80061 LIPID PANEL: CPT | Performed by: INTERNAL MEDICINE

## 2020-09-22 PROCEDURE — 80053 COMPREHEN METABOLIC PANEL: CPT | Performed by: INTERNAL MEDICINE

## 2020-09-22 PROCEDURE — 85025 COMPLETE CBC W/AUTO DIFF WBC: CPT | Performed by: INTERNAL MEDICINE

## 2020-09-22 PROCEDURE — 36415 COLL VENOUS BLD VENIPUNCTURE: CPT | Performed by: INTERNAL MEDICINE

## 2020-09-22 PROCEDURE — 83036 HEMOGLOBIN GLYCOSYLATED A1C: CPT | Performed by: INTERNAL MEDICINE

## 2020-10-15 ENCOUNTER — TRANSFERRED RECORDS (OUTPATIENT)
Dept: HEALTH INFORMATION MANAGEMENT | Facility: CLINIC | Age: 75
End: 2020-10-15

## 2020-11-23 ENCOUNTER — TRANSFERRED RECORDS (OUTPATIENT)
Dept: HEALTH INFORMATION MANAGEMENT | Facility: CLINIC | Age: 75
End: 2020-11-23

## 2020-12-07 ENCOUNTER — TRANSFERRED RECORDS (OUTPATIENT)
Dept: HEALTH INFORMATION MANAGEMENT | Facility: CLINIC | Age: 75
End: 2020-12-07

## 2020-12-07 LAB
ALT SERPL-CCNC: 19 IU/L (ref 5–40)
AST SERPL-CCNC: 24 U/L (ref 5–34)
CREAT SERPL-MCNC: 1.02 MG/DL (ref 0.5–1.3)

## 2020-12-10 ENCOUNTER — TRANSFERRED RECORDS (OUTPATIENT)
Dept: HEALTH INFORMATION MANAGEMENT | Facility: CLINIC | Age: 75
End: 2020-12-10

## 2020-12-10 LAB — RETINOPATHY: NEGATIVE

## 2021-02-09 ENCOUNTER — TRANSFERRED RECORDS (OUTPATIENT)
Dept: HEALTH INFORMATION MANAGEMENT | Facility: CLINIC | Age: 76
End: 2021-02-09

## 2021-02-15 ENCOUNTER — HOSPITAL ENCOUNTER (OUTPATIENT)
Dept: CT IMAGING | Facility: CLINIC | Age: 76
Discharge: HOME | End: 2021-02-15
Attending: INTERNAL MEDICINE | Admitting: INTERNAL MEDICINE
Payer: COMMERCIAL

## 2021-02-15 DIAGNOSIS — Z87.891 FORMER SMOKER: ICD-10-CM

## 2021-02-15 PROCEDURE — 71271 CT THORAX LUNG CANCER SCR C-: CPT

## 2021-02-17 ENCOUNTER — IMMUNIZATION (OUTPATIENT)
Dept: NURSING | Facility: CLINIC | Age: 76
End: 2021-02-17
Payer: COMMERCIAL

## 2021-02-17 ENCOUNTER — TELEPHONE (OUTPATIENT)
Dept: PEDIATRICS | Facility: CLINIC | Age: 76
End: 2021-02-17

## 2021-02-17 DIAGNOSIS — R91.8 ABNORMAL CT LUNG SCREENING: Primary | ICD-10-CM

## 2021-02-17 PROCEDURE — 91300 PR COVID VAC PFIZER DIL RECON 30 MCG/0.3 ML IM: CPT

## 2021-02-17 PROCEDURE — 0001A PR COVID VAC PFIZER DIL RECON 30 MCG/0.3 ML IM: CPT

## 2021-02-17 NOTE — TELEPHONE ENCOUNTER
Dr. Sewell-   I relayed message to pt. When I tried to sign the order it asks for more info from you. Emiliana Lopez RN on 2/17/2021 at 10:00 AM

## 2021-02-17 NOTE — TELEPHONE ENCOUNTER
I called pt and left message for him to call us back. If no call back today, pls try calling him.    New spot on his chest CT screening test. Screening CT is great for screening but doesn't give enough information to know what the spot is. Need CT with contrast to sort out further. If he is OK with this, OK to sign order.   Yenni Sewell M.D.

## 2021-02-25 ENCOUNTER — HOSPITAL ENCOUNTER (OUTPATIENT)
Dept: CT IMAGING | Facility: CLINIC | Age: 76
Discharge: HOME | End: 2021-02-25
Attending: INTERNAL MEDICINE | Admitting: INTERNAL MEDICINE
Payer: COMMERCIAL

## 2021-02-25 DIAGNOSIS — R91.8 ABNORMAL CT LUNG SCREENING: ICD-10-CM

## 2021-02-25 LAB
CREAT BLD-MCNC: 1.2 MG/DL (ref 0.66–1.25)
GFR SERPL CREATININE-BSD FRML MDRD: 59 ML/MIN/{1.73_M2}

## 2021-02-25 PROCEDURE — 82565 ASSAY OF CREATININE: CPT

## 2021-02-25 PROCEDURE — 250N000009 HC RX 250: Performed by: INTERNAL MEDICINE

## 2021-02-25 PROCEDURE — 71260 CT THORAX DX C+: CPT

## 2021-02-25 PROCEDURE — 250N000011 HC RX IP 250 OP 636: Performed by: INTERNAL MEDICINE

## 2021-02-25 RX ORDER — IOPAMIDOL 755 MG/ML
500 INJECTION, SOLUTION INTRAVASCULAR ONCE
Status: COMPLETED | OUTPATIENT
Start: 2021-02-25 | End: 2021-02-25

## 2021-02-25 RX ADMIN — SODIUM CHLORIDE 60 ML: 9 INJECTION, SOLUTION INTRAVENOUS at 09:21

## 2021-02-25 RX ADMIN — IOPAMIDOL 80 ML: 755 INJECTION, SOLUTION INTRAVENOUS at 09:21

## 2021-03-03 ENCOUNTER — MYC MEDICAL ADVICE (OUTPATIENT)
Dept: PEDIATRICS | Facility: CLINIC | Age: 76
End: 2021-03-03

## 2021-03-03 NOTE — TELEPHONE ENCOUNTER
I left message for patient to call back. Will order ct/pet. Confirmed patient received my result note.  Yenni Sewell M.D.

## 2021-03-08 ENCOUNTER — TRANSFERRED RECORDS (OUTPATIENT)
Dept: HEALTH INFORMATION MANAGEMENT | Facility: CLINIC | Age: 76
End: 2021-03-08

## 2021-03-08 LAB
ALT SERPL-CCNC: 19 IU/L (ref 5–40)
AST SERPL-CCNC: 21 U/L (ref 5–34)
CREAT SERPL-MCNC: 0.94 MG/DL (ref 0.5–1.3)
GFR SERPL CREATININE-BSD FRML MDRD: 82.7 ML/MIN/1.73M2

## 2021-03-10 ENCOUNTER — IMMUNIZATION (OUTPATIENT)
Dept: NURSING | Facility: CLINIC | Age: 76
End: 2021-03-10
Attending: INTERNAL MEDICINE
Payer: COMMERCIAL

## 2021-03-10 PROCEDURE — 0002A PR COVID VAC PFIZER DIL RECON 30 MCG/0.3 ML IM: CPT

## 2021-03-10 PROCEDURE — 91300 PR COVID VAC PFIZER DIL RECON 30 MCG/0.3 ML IM: CPT

## 2021-03-16 ENCOUNTER — HOSPITAL ENCOUNTER (OUTPATIENT)
Dept: PET IMAGING | Facility: CLINIC | Age: 76
Discharge: HOME | End: 2021-03-16
Attending: INTERNAL MEDICINE | Admitting: INTERNAL MEDICINE
Payer: COMMERCIAL

## 2021-03-16 DIAGNOSIS — R91.8 ABNORMAL CT LUNG SCREENING: ICD-10-CM

## 2021-03-16 PROCEDURE — 71260 CT THORAX DX C+: CPT | Mod: 26

## 2021-03-16 PROCEDURE — 74177 CT ABD & PELVIS W/CONTRAST: CPT | Mod: 26

## 2021-03-16 PROCEDURE — 78816 PET IMAGE W/CT FULL BODY: CPT | Mod: 26

## 2021-03-16 PROCEDURE — 250N000011 HC RX IP 250 OP 636: Performed by: INTERNAL MEDICINE

## 2021-03-16 PROCEDURE — 78816 PET IMAGE W/CT FULL BODY: CPT | Mod: PI

## 2021-03-16 PROCEDURE — 71260 CT THORAX DX C+: CPT

## 2021-03-16 PROCEDURE — 343N000001 HC RX 343: Performed by: INTERNAL MEDICINE

## 2021-03-16 PROCEDURE — A9552 F18 FDG: HCPCS | Performed by: INTERNAL MEDICINE

## 2021-03-16 RX ORDER — IOPAMIDOL 755 MG/ML
10-135 INJECTION, SOLUTION INTRAVASCULAR ONCE
Status: COMPLETED | OUTPATIENT
Start: 2021-03-16 | End: 2021-03-16

## 2021-03-16 RX ADMIN — IOPAMIDOL 120 ML: 755 INJECTION, SOLUTION INTRAVENOUS at 09:34

## 2021-03-16 RX ADMIN — FLUDEOXYGLUCOSE F-18 13.22 MCI.: 500 INJECTION, SOLUTION INTRAVENOUS at 09:14

## 2021-03-17 DIAGNOSIS — R91.8 ABNORMAL CT LUNG SCREENING: Primary | ICD-10-CM

## 2021-03-18 ENCOUNTER — TELEPHONE (OUTPATIENT)
Dept: PEDIATRICS | Facility: CLINIC | Age: 76
End: 2021-03-18

## 2021-03-23 NOTE — TELEPHONE ENCOUNTER
Called and spoke with [facility] in [place]. Based on indication results of PET scan, she thought it would be best to see thoracic specialist for biopsy. The thoracic specialist is Dr. Clarke and he is located in [facility]. I was transferred to his  VM to confirm if this is best seen by them. Had to leave a detailed VM with [facility] direct number requesting call back.    Lucille QUACH RN

## 2021-03-23 NOTE — TELEPHONE ENCOUNTER
pls call oncology scheduling. Prefer [facility]. [facility] at [phone].   Need to know if he would get scheduled to see thoracic specialist for biopsy, or would see general oncologist. I'm happy to speak with oncology if they prefer.  He is not aware of results so do not want him called to schedule until I'm able to discuss with him.   Yenni Sewell M.D.

## 2021-03-23 NOTE — TELEPHONE ENCOUNTER
Received VM from Rhina, the  with Dr. Clarke. It would be appropriate for patient to see Dr. Clarke as he can then determine the best way to get the tissue biopsy. OK for patient to call her directly at [phone].    Will update Dr. Sewell so this can be discussed at tomorrows appointment.    Lucille QUACH RN

## 2021-03-24 ENCOUNTER — OFFICE VISIT (OUTPATIENT)
Dept: PEDIATRICS | Facility: CLINIC | Age: 76
End: 2021-03-24
Payer: COMMERCIAL

## 2021-03-24 VITALS
HEIGHT: 74 IN | RESPIRATION RATE: 16 BRPM | OXYGEN SATURATION: 93 % | DIASTOLIC BLOOD PRESSURE: 58 MMHG | WEIGHT: 200.9 LBS | BODY MASS INDEX: 25.78 KG/M2 | HEART RATE: 85 BPM | TEMPERATURE: 98.4 F | SYSTOLIC BLOOD PRESSURE: 116 MMHG

## 2021-03-24 DIAGNOSIS — Z79.4 TYPE 2 DIABETES MELLITUS WITH STAGE 1 CHRONIC KIDNEY DISEASE, WITH LONG-TERM CURRENT USE OF INSULIN (H): ICD-10-CM

## 2021-03-24 DIAGNOSIS — J44.9 STAGE 3 SEVERE COPD BY GOLD CLASSIFICATION (H): ICD-10-CM

## 2021-03-24 DIAGNOSIS — J44.1 COPD EXACERBATION (H): ICD-10-CM

## 2021-03-24 DIAGNOSIS — Z11.59 NEED FOR HEPATITIS C SCREENING TEST: ICD-10-CM

## 2021-03-24 DIAGNOSIS — Z12.5 SCREENING FOR PROSTATE CANCER: ICD-10-CM

## 2021-03-24 DIAGNOSIS — E11.22 TYPE 2 DIABETES MELLITUS WITH STAGE 1 CHRONIC KIDNEY DISEASE, WITH LONG-TERM CURRENT USE OF INSULIN (H): ICD-10-CM

## 2021-03-24 DIAGNOSIS — R94.2 ABNORMAL PET SCAN OF LUNG: Primary | ICD-10-CM

## 2021-03-24 DIAGNOSIS — N18.1 TYPE 2 DIABETES MELLITUS WITH STAGE 1 CHRONIC KIDNEY DISEASE, WITH LONG-TERM CURRENT USE OF INSULIN (H): ICD-10-CM

## 2021-03-24 PROCEDURE — 99215 OFFICE O/P EST HI 40 MIN: CPT | Performed by: INTERNAL MEDICINE

## 2021-03-24 RX ORDER — PREDNISONE 20 MG/1
40 TABLET ORAL DAILY
Qty: 10 TABLET | Refills: 0 | Status: SHIPPED | OUTPATIENT
Start: 2021-03-24 | End: 2021-06-30

## 2021-03-24 RX ORDER — LEVOFLOXACIN 500 MG/1
500 TABLET, FILM COATED ORAL DAILY
Qty: 5 TABLET | Refills: 0 | Status: SHIPPED | OUTPATIENT
Start: 2021-03-24 | End: 2021-06-30

## 2021-03-24 ASSESSMENT — MIFFLIN-ST. JEOR: SCORE: 1712.54

## 2021-03-24 NOTE — PROGRESS NOTES
Assessment & Plan     Abnormal PET scan of lung  Reviewed his PET/CT results and notable hypermetabolic nodule, suspicious for malignancy. Discussed with [facility], Dr. Clarke team. Was able to schedule Theo for a follow up appointment tomorrow afternoon for consult with Dr. Clarke, thoracic oncologist. Reviewed with Theo what he might expect to discuss at that visit, and the need for tissue diagnosis for any confirmation. Discussed possible need for preop visit if surgical biopsy is needed. Answered all questions to the best of my ability.    COPD exacerbation (H)  No fevers, but sats lower than usual, has increased sputum and increased shortness of breath in the last few weeks. Recent CT last week not consistent with pneumonia. 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 Given mucous chest symptoms, recommended treatment for COPD exacerbation with short course of prednisone and antibiotics. Given his chronic steroid use, rxd levaquin. Discussed potential tendon and neuro problems with this medication, and rationale for prescribing. Will ask our nurses to follow up with him by phone in 2 days to make sure he is having some improvement as he does not have frequent exacerbations.  - predniSONE (DELTASONE) 20 MG tablet; Take 2 tablets (40 mg) by mouth daily  - levofloxacin (LEVAQUIN) 500 MG tablet; Take 1 tablet (500 mg) by mouth daily    Stage 3 severe COPD by GOLD classification (H)  Likely in exacerbation, as above. Reviewed most recent PFT's at [facility].    Type 2 diabetes mellitus with stage 1 chronic kidney disease, with long-term current use of insulin (H)  No change in treatment plan at this time due to above active issues. Is overdue for A1C but may be having lab for preop in near future so defer to that visit.     Need for hepatitis C screening test      Screening for prostate cancer        Discussion of management or test interpretation with external physician/other qualified healthcare  "professional/appropriate source - [facility] RN for appropriate scheduling with Dr. Clarke and discussion of likely options for patient  55 minutes spent on the date of the encounter doing chart review, history and exam, documentation and further activities as noted above       BMI:   Estimated body mass index is 25.51 kg/m  as calculated from the following:    Height as of this encounter: 1.89 m (6' 2.41\").    Weight as of this encounter: 91.1 kg (200 lb 14.4 oz).       Patient Instructions   Monday at 2:15, check in at 1:45.  [facility]  [address]  [address]  Dr. Refugio Clarke    If questions, can call Rhina, his .  [phone]    Take prednisone 40 mg daily for 5 days  Take levaquin 500 mg once daily for 5 days.   I'll ask someone to call you Friday to check in.      No follow-ups on file.    Yenni Sewell MD  [facility] MADDIE Venegas is a 75 year old who presents for the following health issues    History of Present Illness       Diabetes:   He presents for follow up of diabetes.  He is checking home blood glucose one time daily. He checks blood glucose before and after meals.  Blood glucose is never over 200 and sometimes over 70. When his blood glucose is low, the patient is asymptomatic for confusion, blurred vision, lethargy and reports not feeling dizzy, shaky, or weak.  He has no concerns regarding his diabetes at this time.  He is having numbness in feet.         He eats 2-3 servings of fruits and vegetables daily.He consumes 0 sweetened beverage(s) daily.He exercises with enough effort to increase his heart rate 30 to 60 minutes per day.  He exercises with enough effort to increase his heart rate 5 days per week.   He is taking medications regularly.     Has noticed more soboe in the last few weeks. Some increase in sputum volume. He feels not sure if the sputum is post nasal drip, but does feel mucous rattling in his chest when he breathes at " times, which is unusual for him.     Hyperlipidemia Follow-Up      Are you regularly taking any medication or supplement to lower your cholesterol?   Yes- ATORVASTATIN    Are you having muscle aches or other side effects that you think could be caused by your cholesterol lowering medication?  No    Hypertension Follow-up      Do you check your blood pressure regularly outside of the clinic? No     Are you following a low salt diet? No, no added salt    Are your blood pressures ever more than 140 on the top number (systolic) OR more   than 90 on the bottom number (diastolic), for example 140/90? Not taking    BP Readings from Last 2 Encounters:   03/24/21 116/58   01/20/20 124/64     Hemoglobin A1C (%)   Date Value   09/22/2020 5.7 (H)   01/20/2020 5.9 (H)     LDL Cholesterol Calculated (mg/dL)   Date Value   09/22/2020 44   07/17/2019 54         COPD Follow-Up    Overall, how are your COPD symptoms since your last clinic visit?  Slightly worse    How much fatigue or shortness of breath do you have when you are walking?  More than usual    How much shortness of breath do you have when you are resting?  Same as usual    How often do you cough? Sometimes    Have you noticed any change in your sputum/phlegm?  Yes- increased volume    Have you experienced a recent fever? No    Have you had any Emergency Room Visits, Urgent Care Visits, or Hospital Admissions because of your COPD since your last office visit?  No     Sees Dr. Pan, [facility], had PFT's done earlier this year.     History   Smoking Status     Former Smoker     Packs/day: 0.50     Years: 47.00     Types: Cigarettes, Cigars, Pipe     Start date: 3/1/1966     Quit date: 10/1/2013   Smokeless Tobacco     Never Used     Comment: occ         Review of Systems   Constitutional, HEENT, cardiovascular, pulmonary, gi and gu systems are negative, except as otherwise noted.      Objective    /58 (BP Location: Right arm, Patient Position: Sitting, Cuff Size:  "Adult Large)   Pulse 85   Temp 98.4  F (36.9  C) (Tympanic)   Resp 16   Ht 1.89 m (6' 2.41\")   Wt 91.1 kg (200 lb 14.4 oz)   SpO2 93%   BMI 25.51 kg/m    Body mass index is 25.51 kg/m .  Physical Exam   GENERAL: healthy, alert and no distress  NECK: no adenopathy, no asymmetry, masses, or scars and thyroid normal to palpation  RESP: lungs clear to auscultation - no rales, rhonchi or wheezes, lung sounds distant  CV: regular rate and rhythm, normal S1 S2, no S3 or S4, no murmur, click or rub, no peripheral edema and peripheral pulses strong  ABDOMEN: soft, nontender, no hepatosplenomegaly, no masses and bowel sounds normal      Transferred Records on 03/08/2021   Component Date Value Ref Range Status     ALT 03/08/2021 19  5 - 40 IU/L Final     AST 03/08/2021 21  5 - 34 U/L Final     Creatinine 03/08/2021 0.940  0.500 - 1.300 mg/dL Final     GFR Estimate 03/08/2021 82.7  ml/min/1.73m2 Final     Combined Report of:    PET and CT on  3/16/2021 11:11 AM :     1. PET of the neck, chest, abdomen, and pelvis.  2. PET CT Fusion for Attenuation Correction and Anatomical  Localization:    3. Diagnostic CT scan of the chest, abdomen, and pelvis with  intravenous contrast for interpretation.  3. CT of the chest, abdomen and pelvis obtained for diagnostic  interpretation.  4. 3D MIP and PET-CT fused images were processed on an independent  workstation and archived to PACS and reviewed by a radiologist.     Technique:     1. PET: The patient received 13.22 mCi of F-18-FDG; the serum glucose  was 96 prior to administration, body weight was 89.7 kg. Images were  evaluated in the axial, sagittal, and coronal planes as well as the  rotational whole body MIP. Images were acquired from the Vertex to the  Feet.     UPTAKE WAS MEASURED AT 79 MINUTES.      BACKGROUND:  Liver SUV max= 4.5,   Aorta Blood SUV Max: 3.1.      2. CT: Volumetric acquisition for clinical interpretation of the  chest, abdomen, and pelvis acquired at 3 mm " sections . The chest,  abdomen, and pelvis were evaluated at 5 mm sections in bone, soft  tissue, and lung windows.       The patient received 120 cc of Isovue 370 intravenously for the  examination.    --     3. 3D MIP and PET-CT fused images were processed on an independent  workstation and archived to PACS and reviewed by a radiologist.     INDICATION: Lung nodule, > 8mm; nodule increasing in size; Abnormal CT  lung screening     ADDITIONAL INFORMATION OBTAINED FROM EMR: 75-year-old male with  history of enlarging right upper lobe pulmonary nodule, concerning for  malignancy. Patient received 1st Vaccine shot Lt Deltoid, 2nd vaccine  3/10/21 Rt Deltoid     COMPARISON: CT chest 2/25/2021 and CT abdomen and pelvis 4/1/2015     FINDINGS:      HEAD/NECK:  There is no  suspicious FDG uptake in the neck.      The paranasal sinuses are clear. The mastoid air cells are clear. The  mucosal pharyngeal space, the , prevertebral and carotid  spaces are within normal limits. No masses, mass effect or  pathologically enlarged lymph nodes are evident. The thyroid gland is  unremarkable.     CHEST:  1.8 x 1.3 cm hypermetabolic subpleural pulmonary nodule in the right  upper lobe with an SUV max of 18.9 (series 5 image 160). Nonenlarged  bilateral axillary lymph nodes with low level FDG uptake with an SUV  max of 2.5, likely reactive.     The central tracheobronchial tree is patent. Extensive diffuse  emphysematous changes. Stable calcified pleural plaques in the right  lower lobe. Unchanged 6 mm subpleural pulmonary nodule in the left  lower lobe without associated abnormal FDG uptake (series 5 image  239). Focal consolidative opacity in the anterior left upper lobe with  mild FDG uptake with SUV max of 3.0 (series 5 image 193). The heart  size is normal. No pericardial effusion. Mild coronary artery  calcifications. No central pulmonary embolus. Normal caliber main  pulmonary artery and thoracic aorta. Conventional 3  vessel branching  aortic arch with mild atherosclerotic calcifications. No enlarged or  hypermetabolic axillary or mediastinal lymph nodes. Sliding hiatal  hernia with mild FDG uptake with an SUV max of 5.5, likely related to  esophagitis.        ABDOMEN AND PELVIS:  There is no suspicious FDG uptake in the abdomen or pelvis.     The liver is normal in appearance. No focal hepatic mass. No  intrahepatic or extra hepatic biliary dilatation. Cholelithiasis. The  pancreas, spleen, and adrenal glands are normal in appearance.  Symmetric renal cortical enhancement. No nephrolithiasis. No  hydronephrosis. Mild prominence of the proximal left ureter with  circumferential soft tissue thickening (series 5 image 344). Mild  prostatomegaly. Diffuse bladder wall thickening. No free fluid within  the pelvis. No free intraperitoneal air. No abnormally dilated loops  or small bowel. Colonic diverticulosis without evidence of acute  diverticulitis. Diffuse FDG uptake throughout the large bowel, which  is likely related to metformin use. Intra-abdominal vasculature is  widely patent. The infrarenal aorta is of normal caliber. Persistent  soft tissue thickening adjacent to the infrarenal aorta, which is  similar to appearance from prior on 4/1/2015. Aortoiliac  atherosclerotic calcifications. No enlarged or hypermetabolic lymph  nodes within the abdomen or pelvis. Small fat-containing umbilical  hernia. Small fat-containing bilateral inguinal hernias.     LOWER EXTREMITIES:   No abnormal masses or hypermetabolic lesions.     BONES:   There are no suspicious lytic or blastic osseous lesions.  There is no  abnormal FDG uptake in the skeleton. Multilevel degenerative changes  of the spine, hips, and sacroiliac joints.                                                                         IMPRESSION: In this patient being worked up for solitary pulmonary  nodule and malignancy:     1. Hypermetabolic 1.8 cm right upper lobe pulmonary  nodule, malignancy  until proven otherwise. Recommend tissue sampling for further  evaluation.     2. Nonenlarged bilateral axillary lymph nodes with low level FDG  uptake, likely reactive due to recent COVID-19 immunization.     3. Unchanged small area of consolidation in the anterior left upper  lobe with low level FDG uptake, which may related to sequela of prior  infection. Recommend continued follow-up until resolution.     4. Extensive pulmonary emphysema.     5. Stable calcified pleural plaques, likely related to prior asbestos  exposure.     6. Hiatal hernia with low-level FDG uptake in the distal esophagus,  consistent with esophagitis.     7. Persistent soft tissue thickening adjacent to the infrarenal aorta,  which can be seen in the setting of retroperitoneal fibrosis.  Additionally, there is stable appearing soft tissue thickening about  the proximal left ureter which is similar to appearance from prior CT  on 4/1/2015.     I have personally reviewed the examination and initial interpretation  and I agree with the findings.     DEVIN CHO MD

## 2021-03-24 NOTE — PATIENT INSTRUCTIONS
Monday at 2:15, check in at 1:45.  [facility]  [address]  [address]  Dr. Refugio Clarke    If questions, can call Rhina, his .  [phone]    Take prednisone 40 mg daily for 5 days  Take levaquin 500 mg once daily for 5 days.   I'll ask someone to call you Friday to check in.

## 2021-03-26 ENCOUNTER — TELEPHONE (OUTPATIENT)
Dept: PEDIATRICS | Facility: CLINIC | Age: 76
End: 2021-03-26

## 2021-03-26 NOTE — TELEPHONE ENCOUNTER
Called patient to get an update on how he is doing after 3/24/21 OV. No answer. Left  requesting call back at [facility] direct line.    Lucille QUACH RN

## 2021-03-26 NOTE — TELEPHONE ENCOUNTER
Received call back from patient. Patient had OV 3/24 for COPD exacerbation. I was asked to follow up today to see how he is doing.    Patient states he is doing OK. Patient does state if anything, he might feel a little shorter of breath than since Wednesday, but isn't too sure. States he is not worse, but not really better. Started levaquin and prednisone on 3/25 so has had 2 doses at this point. Would like to give medication more time since has only been 1 day. Patient denies any side effect concerns with the medications.     Patient does not have any concerns other than he states [facility] called him yesterday and expressed concerns with him taking 40 mg of prednisone and then just stopping without any taper.     We discussed that it is true that when on prednisone long term, tapering is needed due to adrenal glands producing less cortisol when on prednisone. However, this is a short course of 5 days. Therefore, taper not needed. Patient expressed being satisfied with this information. And no further questions at this time.    I did advise that [facility] available 24/7 and to not hesitate to call if any questions over the weekend. Patient has PAL to call with questions as well.    Lucille QUACH RN

## 2021-03-29 ENCOUNTER — TRANSFERRED RECORDS (OUTPATIENT)
Dept: HEALTH INFORMATION MANAGEMENT | Facility: CLINIC | Age: 76
End: 2021-03-29

## 2021-03-30 NOTE — TELEPHONE ENCOUNTER
pls check in with him again. Also ask re appt with Dr. Clarke. May need to schedule preop?  Yenni Sewell M.D.

## 2021-03-30 NOTE — TELEPHONE ENCOUNTER
"Spoke with patient.    Completed prednisone and levaquin on Monday (3/29).    Patient notes improvement. Less phlegm- states \"seems to have dried up\"    Does report getting winded yesterday although also notes was rushing around quite a bit during the day. Feels his breathing is otherwise baseline for him.     Patient had appointment suzan Dr. Clarke yesterday. Spo2 was 94% (was 93% on 3/24)- Does not have a pulse ox at home to check. Patient states pre-op is not needed as they discussed going through series of \"procedures\" to \"zap\" the area stating this would be less invasive than biopsy or surgery. Patient will call the clinic if this changes and a pre-op is needed.     Patient does not have any questions or concerns at this time. Patient feels he has improved. I did let him know Dr. Sewell is in office M-W and, although he has my direct number, I will be out of office rest of the week. If he has questions or concerns that come up, he will call main clinic number and is also aware [facility] available 24/7. Patient verbalized understanding.    Lucille QUACH RN    "

## 2021-04-01 ENCOUNTER — TRANSFERRED RECORDS (OUTPATIENT)
Dept: HEALTH INFORMATION MANAGEMENT | Facility: CLINIC | Age: 76
End: 2021-04-01

## 2021-04-10 ENCOUNTER — HEALTH MAINTENANCE LETTER (OUTPATIENT)
Age: 76
End: 2021-04-10

## 2021-04-12 ENCOUNTER — MYC MEDICAL ADVICE (OUTPATIENT)
Dept: PEDIATRICS | Facility: CLINIC | Age: 76
End: 2021-04-12

## 2021-04-16 ENCOUNTER — TRANSFERRED RECORDS (OUTPATIENT)
Dept: HEALTH INFORMATION MANAGEMENT | Facility: CLINIC | Age: 76
End: 2021-04-16

## 2021-04-26 DIAGNOSIS — N18.1 TYPE 2 DIABETES MELLITUS WITH STAGE 1 CHRONIC KIDNEY DISEASE, WITH LONG-TERM CURRENT USE OF INSULIN (H): ICD-10-CM

## 2021-04-26 DIAGNOSIS — Z79.4 TYPE 2 DIABETES MELLITUS WITH STAGE 1 CHRONIC KIDNEY DISEASE, WITH LONG-TERM CURRENT USE OF INSULIN (H): ICD-10-CM

## 2021-04-26 DIAGNOSIS — E11.22 TYPE 2 DIABETES MELLITUS WITH STAGE 1 CHRONIC KIDNEY DISEASE, WITH LONG-TERM CURRENT USE OF INSULIN (H): ICD-10-CM

## 2021-04-26 DIAGNOSIS — Z12.5 SCREENING FOR PROSTATE CANCER: ICD-10-CM

## 2021-04-26 DIAGNOSIS — Z11.59 NEED FOR HEPATITIS C SCREENING TEST: ICD-10-CM

## 2021-04-26 LAB
CREAT UR-MCNC: 268 MG/DL
HBA1C MFR BLD: 6 % (ref 0–5.6)
HCV AB SERPL QL IA: NONREACTIVE
MICROALBUMIN UR-MCNC: 32 MG/L
MICROALBUMIN/CREAT UR: 11.9 MG/G CR (ref 0–17)
PSA SERPL-ACNC: 0.7 UG/L (ref 0–4)

## 2021-04-26 PROCEDURE — 83036 HEMOGLOBIN GLYCOSYLATED A1C: CPT | Performed by: INTERNAL MEDICINE

## 2021-04-26 PROCEDURE — 36415 COLL VENOUS BLD VENIPUNCTURE: CPT | Performed by: INTERNAL MEDICINE

## 2021-04-26 PROCEDURE — G0103 PSA SCREENING: HCPCS | Performed by: INTERNAL MEDICINE

## 2021-04-26 PROCEDURE — 82043 UR ALBUMIN QUANTITATIVE: CPT | Performed by: INTERNAL MEDICINE

## 2021-04-26 PROCEDURE — 86803 HEPATITIS C AB TEST: CPT | Performed by: INTERNAL MEDICINE

## 2021-06-07 ENCOUNTER — TRANSFERRED RECORDS (OUTPATIENT)
Dept: HEALTH INFORMATION MANAGEMENT | Facility: CLINIC | Age: 76
End: 2021-06-07

## 2021-06-07 LAB
ALT SERPL-CCNC: 17 IU/L (ref 5–40)
AST SERPL-CCNC: 18 U/L (ref 5–34)
CREAT SERPL-MCNC: 0.92 MG/DL (ref 0.5–1.3)

## 2021-06-24 NOTE — PROGRESS NOTES
Pre-Visit Planning     Appointment Notes for this encounter:   Annual Wellness Exam    Questionnaires Reviewed/Assigned  No additional questionnaires are needed     Spoke with patient and confirmed upcoming appointment. No other concerns that this time. Reviewed medications.    Lucille QUACH RN

## 2021-06-30 ENCOUNTER — OFFICE VISIT (OUTPATIENT)
Dept: PEDIATRICS | Facility: CLINIC | Age: 76
End: 2021-06-30
Payer: COMMERCIAL

## 2021-06-30 VITALS
WEIGHT: 199.2 LBS | DIASTOLIC BLOOD PRESSURE: 60 MMHG | HEART RATE: 60 BPM | TEMPERATURE: 97.2 F | RESPIRATION RATE: 12 BRPM | HEIGHT: 75 IN | SYSTOLIC BLOOD PRESSURE: 116 MMHG | OXYGEN SATURATION: 97 % | BODY MASS INDEX: 24.77 KG/M2

## 2021-06-30 DIAGNOSIS — Z13.6 CARDIOVASCULAR SCREENING; LDL GOAL LESS THAN 130: ICD-10-CM

## 2021-06-30 DIAGNOSIS — R12 HEARTBURN: ICD-10-CM

## 2021-06-30 DIAGNOSIS — Z00.00 ENCOUNTER FOR MEDICARE ANNUAL WELLNESS EXAM: Primary | ICD-10-CM

## 2021-06-30 DIAGNOSIS — Z79.4 TYPE 2 DIABETES MELLITUS WITH STAGE 1 CHRONIC KIDNEY DISEASE, WITH LONG-TERM CURRENT USE OF INSULIN (H): ICD-10-CM

## 2021-06-30 DIAGNOSIS — Z12.5 SCREENING FOR PROSTATE CANCER: ICD-10-CM

## 2021-06-30 DIAGNOSIS — N18.1 TYPE 2 DIABETES MELLITUS WITH STAGE 1 CHRONIC KIDNEY DISEASE, WITH LONG-TERM CURRENT USE OF INSULIN (H): ICD-10-CM

## 2021-06-30 DIAGNOSIS — J44.9 STAGE 3 SEVERE COPD BY GOLD CLASSIFICATION (H): ICD-10-CM

## 2021-06-30 DIAGNOSIS — C34.91 CARCINOMA OF RIGHT LUNG (H): ICD-10-CM

## 2021-06-30 DIAGNOSIS — E11.22 TYPE 2 DIABETES MELLITUS WITH STAGE 1 CHRONIC KIDNEY DISEASE, WITH LONG-TERM CURRENT USE OF INSULIN (H): ICD-10-CM

## 2021-06-30 PROCEDURE — 99397 PER PM REEVAL EST PAT 65+ YR: CPT | Performed by: INTERNAL MEDICINE

## 2021-06-30 PROCEDURE — 99207 PR FOOT EXAM NO CHARGE: CPT | Mod: 25 | Performed by: INTERNAL MEDICINE

## 2021-06-30 RX ORDER — ALBUTEROL SULFATE 90 UG/1
AEROSOL, METERED RESPIRATORY (INHALATION)
COMMUNITY
Start: 2021-02-09 | End: 2023-07-31

## 2021-06-30 RX ORDER — ATORVASTATIN CALCIUM 10 MG/1
10 TABLET, FILM COATED ORAL DAILY
Qty: 90 TABLET | Refills: 11 | Status: SHIPPED | OUTPATIENT
Start: 2021-06-30 | End: 2021-08-16

## 2021-06-30 RX ORDER — LANCETS
EACH MISCELLANEOUS
COMMUNITY
End: 2021-12-27

## 2021-06-30 RX ORDER — FAMOTIDINE 20 MG/1
20 TABLET, FILM COATED ORAL 2 TIMES DAILY
Qty: 180 TABLET | Refills: 4 | Status: SHIPPED | OUTPATIENT
Start: 2021-06-30 | End: 2022-07-27

## 2021-06-30 RX ORDER — LISINOPRIL 2.5 MG/1
2.5 TABLET ORAL DAILY
Qty: 90 TABLET | Refills: 11 | Status: SHIPPED | OUTPATIENT
Start: 2021-06-30 | End: 2021-08-16

## 2021-06-30 RX ORDER — METFORMIN HCL 500 MG
1000 TABLET, EXTENDED RELEASE 24 HR ORAL 2 TIMES DAILY WITH MEALS
Qty: 360 TABLET | Refills: 11 | Status: SHIPPED | OUTPATIENT
Start: 2021-06-30 | End: 2022-07-27

## 2021-06-30 RX ORDER — FAMOTIDINE 20 MG/1
TABLET, FILM COATED ORAL
COMMUNITY
Start: 2019-12-02 | End: 2021-06-30

## 2021-06-30 ASSESSMENT — ENCOUNTER SYMPTOMS
WEAKNESS: 0
SHORTNESS OF BREATH: 0
PARESTHESIAS: 0
HEARTBURN: 0
DIZZINESS: 0
CONSTIPATION: 0
ABDOMINAL PAIN: 0
NAUSEA: 0
ARTHRALGIAS: 0
HEMATURIA: 0
DIARRHEA: 0
CHILLS: 0
JOINT SWELLING: 0
DYSURIA: 0
FEVER: 0
MYALGIAS: 0
NERVOUS/ANXIOUS: 0
COUGH: 0
SORE THROAT: 0
EYE PAIN: 0
PALPITATIONS: 0
HEADACHES: 0
FREQUENCY: 0
HEMATOCHEZIA: 0

## 2021-06-30 ASSESSMENT — ACTIVITIES OF DAILY LIVING (ADL): CURRENT_FUNCTION: NO ASSISTANCE NEEDED

## 2021-06-30 ASSESSMENT — MIFFLIN-ST. JEOR: SCORE: 1717.32

## 2021-06-30 NOTE — PROGRESS NOTES
"SUBJECTIVE:   Lauri Mcgee is a 75 year old male who presents for Preventive Visit.    {  Patient has been advised of split billing requirements and indicates understanding: No   Are you in the first 12 months of your Medicare coverage?  No    Healthy Habits:     In general, how would you rate your overall health?  Good    Frequency of exercise:  2-3 days/week    Duration of exercise:  30-45 minutes    Do you usually eat at least 4 servings of fruit and vegetables a day, include whole grains    & fiber and avoid regularly eating high fat or \"junk\" foods?  Yes    Taking medications regularly:  Yes    Medication side effects:  No muscle aches and No significant flushing    Ability to successfully perform activities of daily living:  No assistance needed    Home Safety:  Throw rugs in the hallway and lack of grab bars in the bathroom    Hearing Impairment:  Difficulty following a conversation in a noisy restaurant or crowded room and difficulty understanding soft or whispered speech    In the past 6 months, have you been bothered by leaking of urine?  No    In general, how would you rate your overall mental or emotional health?  Good      PHQ-2 Total Score: 0    Additional concerns today:  No    Do you feel safe in your environment? Yes    Have you ever done Advance Care Planning? (For example, a Health Directive, POLST, or a discussion with a medical provider or your loved ones about your wishes): Yes, advance care planning is on file.      Fall risk  Fallen 2 or more times in the past year?: No  Any fall with injury in the past year?: No    Cognitive Screening   1) Repeat 3 items (Leader, Season, Table)    2) Clock draw: NORMAL  3) 3 item recall: Recalls 2 objects   Results: NORMAL clock, 1-2 items recalled: COGNITIVE IMPAIRMENT LESS LIKELY    Mini-CogTM Copyright S [employer]. Licensed by the author for use in [employer]; reprinted with permission (lissette@.[email or web address]). All rights reserved.          Reviewed and " updated as needed this visit by clinical staff  Tobacco  Allergies    Med Hx  Surg Hx  Fam Hx  Soc Hx        Reviewed and updated as needed this visit by Provider                Social History     Tobacco Use     Smoking status: Former Smoker     Packs/day: 0.50     Years: 47.00     Pack years: 23.50     Types: Cigarettes, Cigars, Pipe     Start date: 3/1/1966     Quit date: 10/1/2013     Years since quittin.7     Smokeless tobacco: Never Used     Tobacco comment: occ   Substance Use Topics     Alcohol use: Yes     Alcohol/week: 0.0 standard drinks     Frequency: Never     Drinks per session: Patient refused     Binge frequency: Never     Comment: None since 2006     If you drink alcohol do you typically have >3 drinks per day or >7 drinks per week? No    Alcohol Use 2021   Prescreen: >3 drinks/day or >7 drinks/week? Not Applicable   Prescreen: >3 drinks/day or >7 drinks/week? -       Diabetes Follow-up    How often are you checking your blood sugar? One time daily  What time of day are you checking your blood sugars (select all that apply)?  Before and after meals  Have you had any blood sugars above 200?  No  Have you had any blood sugars below 70?  Yes, one reading of 57    What symptoms do you notice when your blood sugar is low?  None    What concerns do you have today about your diabetes? None     Do you have any of these symptoms? (Select all that apply)  Numbness in feet      Hyperlipidemia Follow-Up      Are you regularly taking any medication or supplement to lower your cholesterol?   Yes- atorvastatin    Are you having muscle aches or other side effects that you think could be caused by your cholesterol lowering medication?  No    Hypertension Follow-up      Do you check your blood pressure regularly outside of the clinic? No     Are you following a low salt diet? Yes      BP Readings from Last 2 Encounters:   21 116/60   21 116/58     Hemoglobin A1C (%)   Date Value    04/26/2021 6.0 (H)   09/22/2020 5.7 (H)     LDL Cholesterol Calculated (mg/dL)   Date Value   09/22/2020 44   07/17/2019 54         Current providers sharing in care for this patient include:   Patient Care Team:  Yenni Sewell MD as PCP - General  Yenni Sewell MD as Assigned PCP  Elva Nguyễn RD as Diabetes Educator (Dietitian, Registered)  Jose D Campoverde MD as MD (Internal Medicine)  Jerome Rasheed MD as MD (Urology)  Maci Reynolds as Pulmonologist (Physician Assistant)  Abby Guerra (Dermatology)  David Valenzuela Nicole D, RN as Personal Advocate & Liaison (PAL)    The following health maintenance items are reviewed in Epic and correct as of today:  Health Maintenance Due   Topic Date Due     CT CHEST W/O CONTRAST  01/13/2021     DIABETIC FOOT EXAM  01/20/2021     ANNUAL REVIEW OF HM ORDERS  07/13/2021     Labs reviewed in EPIC      Review of Systems   Constitutional: Negative for chills and fever.   HENT: Negative for congestion, ear pain, hearing loss and sore throat.    Eyes: Negative for pain and visual disturbance.   Respiratory: Negative for cough and shortness of breath.    Cardiovascular: Positive for peripheral edema. Negative for chest pain and palpitations.   Gastrointestinal: Negative for abdominal pain, constipation, diarrhea, heartburn, hematochezia and nausea.   Genitourinary: Negative for discharge, dysuria, frequency, genital sores, hematuria, impotence and urgency.   Musculoskeletal: Negative for arthralgias, joint swelling and myalgias.   Skin: Negative for rash.   Neurological: Negative for dizziness, weakness, headaches and paresthesias.   Psychiatric/Behavioral: Negative for mood changes. The patient is not nervous/anxious.      Edema chronic and stable    OBJECTIVE:   /60 (BP Location: Right arm, Patient Position: Sitting, Cuff Size: Adult Large)   Pulse 60   Temp 97.2  F (36.2  C) (Temporal)   Resp 12   Ht 1.91 m (6'  "3.2\")   Wt 90.4 kg (199 lb 3.2 oz)   SpO2 97%   BMI 24.77 kg/m   Estimated body mass index is 24.77 kg/m  as calculated from the following:    Height as of this encounter: 1.91 m (6' 3.2\").    Weight as of this encounter: 90.4 kg (199 lb 3.2 oz).  Physical Exam  GENERAL: healthy, alert and no distress  EYES: Eyes grossly normal to inspection, PERRL and conjunctivae and sclerae normal  NECK: no adenopathy, no asymmetry, masses, or scars and thyroid normal to palpation  RESP: lungs clear to auscultation - no rales, rhonchi or wheezes  CV: regular rate and rhythm, normal S1 S2, no S3 or S4, no murmur, click or rub, no peripheral edema and peripheral pulses strong  ABDOMEN: soft, nontender, no hepatosplenomegaly, no masses and bowel sounds normal  MS: no gross musculoskeletal defects noted, no edema  SKIN: no suspicious lesions or rashes  NEURO: Normal strength and tone, mentation intact and speech normal  PSYCH: mentation appears normal, affect normal/bright  FOOT: no ulcerations, DP pulses 1+, sensation grossly normal.    Diagnostic Test Results:  Labs reviewed in Epic    ASSESSMENT / PLAN:   1. Encounter for Medicare annual wellness exam      2. Carcinoma of right lung (H)  Reviewed recent events. Presumptive diagnosis of lung cancer, biopsy/resection not undertaken, was treated instead with radiation and planning close follow up.    3. Type 2 diabetes mellitus with stage 1 chronic kidney disease, with long-term current use of insulin (H)  Doing well.  - FOOT EXAM    4. Stage 3 severe COPD by GOLD classification (H)  No current change in symptoms. Continue current regimen.    5. Heartburn    - famotidine (PEPCID) 20 MG tablet; Take 1 tablet (20 mg) by mouth 2 times daily  Dispense: 180 tablet; Refill: 4    6. CARDIOVASCULAR SCREENING; LDL GOAL LESS THAN 130    - atorvastatin (LIPITOR) 10 MG tablet; Take 1 tablet (10 mg) by mouth daily  Dispense: 90 tablet; Refill: 11    7. Screening for prostate cancer  **  - " "**Prostate spec antigen screen FUTURE anytime; Future    Patient has been advised of split billing requirements and indicates understanding: Yes  COUNSELING:  Reviewed preventive health counseling, as reflected in patient instructions    Estimated body mass index is 24.77 kg/m  as calculated from the following:    Height as of this encounter: 1.91 m (6' 3.2\").    Weight as of this encounter: 90.4 kg (199 lb 3.2 oz).        He reports that he quit smoking about 7 years ago. His smoking use included cigarettes, cigars, and pipe. He started smoking about 55 years ago. He has a 23.50 pack-year smoking history. He has never used smokeless tobacco.      Appropriate preventive services were discussed with this patient, including applicable screening as appropriate for cardiovascular disease, diabetes, osteopenia/osteoporosis, and glaucoma.  As appropriate for age/gender, discussed screening for colorectal cancer, prostate cancer, breast cancer, and cervical cancer. Checklist reviewing preventive services available has been given to the patient.    Reviewed patients plan of care and provided an AVS. The Intermediate Care Plan ( asthma action plan, low back pain action plan, and migraine action plan) for Lauri meets the Care Plan requirement. This Care Plan has been established and reviewed with the Patient.    Counseling Resources:  ATP IV Guidelines  Pooled Cohorts Equation Calculator  Breast Cancer Risk Calculator  Breast Cancer: Medication to Reduce Risk  FRAX Risk Assessment  ICSI Preventive Guidelines  Dietary Guidelines for Americans, 2010  USDA's MyPlate  ASA Prophylaxis  Lung CA Screening    Yenni Sewell MD  [facility]    Identified Health Risks:    The patient was provided with written information regarding signs of hearing loss.  "

## 2021-07-14 ENCOUNTER — HOSPITAL ENCOUNTER (OUTPATIENT)
Dept: CT IMAGING | Facility: CLINIC | Age: 76
Discharge: HOME | End: 2021-07-14
Attending: THORACIC SURGERY (CARDIOTHORACIC VASCULAR SURGERY) | Admitting: THORACIC SURGERY (CARDIOTHORACIC VASCULAR SURGERY)
Payer: COMMERCIAL

## 2021-07-14 ENCOUNTER — TRANSFERRED RECORDS (OUTPATIENT)
Dept: HEALTH INFORMATION MANAGEMENT | Facility: CLINIC | Age: 76
End: 2021-07-14

## 2021-07-14 DIAGNOSIS — R91.1 SOLITARY LUNG NODULE: ICD-10-CM

## 2021-07-14 LAB
CREAT BLD-MCNC: 1.1 MG/DL (ref 0.7–1.3)
GFR SERPL CREATININE-BSD FRML MDRD: >60 ML/MIN/1.73M2

## 2021-07-14 PROCEDURE — 250N000011 HC RX IP 250 OP 636: Performed by: THORACIC SURGERY (CARDIOTHORACIC VASCULAR SURGERY)

## 2021-07-14 PROCEDURE — 71260 CT THORAX DX C+: CPT

## 2021-07-14 PROCEDURE — 82565 ASSAY OF CREATININE: CPT

## 2021-07-14 PROCEDURE — 250N000009 HC RX 250: Performed by: THORACIC SURGERY (CARDIOTHORACIC VASCULAR SURGERY)

## 2021-07-14 RX ORDER — IOPAMIDOL 755 MG/ML
80 INJECTION, SOLUTION INTRAVASCULAR ONCE
Status: COMPLETED | OUTPATIENT
Start: 2021-07-14 | End: 2021-07-14

## 2021-07-14 RX ADMIN — IOPAMIDOL 80 ML: 755 INJECTION, SOLUTION INTRAVENOUS at 13:02

## 2021-07-14 RX ADMIN — SODIUM CHLORIDE 70 ML: 9 INJECTION, SOLUTION INTRAVENOUS at 13:03

## 2021-07-19 PROBLEM — C34.91: Status: ACTIVE | Noted: 2021-07-19

## 2021-07-19 NOTE — PATIENT INSTRUCTIONS
Patient Education   Personalized Prevention Plan  You are due for the preventive services outlined below.  Your care team is available to assist you in scheduling these services.  If you have already completed any of these items, please share that information with your care team to update in your medical record.  Health Maintenance Due   Topic Date Due     CT Lungs  01/13/2021       Signs of Hearing Loss      Hearing much better with one ear can be a sign of hearing loss.   Hearing loss is a problem shared by many people. In fact, it is one of the most common health problems, particularly as people age. Most people age 65 and older have some hearing loss. By age 80, almost everyone does. Hearing loss often occurs slowly over the years. So you may not realize your hearing has gotten worse.  Have your hearing checked  Call your healthcare provider if you:    Have to strain to hear normal conversation    Have to watch other people s faces very carefully to follow what they re saying    Need to ask people to repeat what they ve said    Often misunderstand what people are saying    Turn the volume of the television or radio up so high that others complain    Feel that people are mumbling when they re talking to you    Find that the effort to hear leaves you feeling tired and irritated    Notice, when using the phone, that you hear better with one ear than the other  [employer] last reviewed this educational content on 1/1/2020 2000-2021 [employer]. All rights reserved. This information is not intended as a substitute for professional medical care. Always follow your healthcare professional's instructions.

## 2021-08-14 ENCOUNTER — TRANSFERRED RECORDS (OUTPATIENT)
Dept: HEALTH INFORMATION MANAGEMENT | Facility: CLINIC | Age: 76
End: 2021-08-14

## 2021-08-14 DIAGNOSIS — Z13.6 CARDIOVASCULAR SCREENING; LDL GOAL LESS THAN 130: ICD-10-CM

## 2021-08-16 RX ORDER — LISINOPRIL 2.5 MG/1
TABLET ORAL
Qty: 90 TABLET | Refills: 1 | Status: SHIPPED | OUTPATIENT
Start: 2021-08-16 | End: 2021-12-27

## 2021-08-16 RX ORDER — ATORVASTATIN CALCIUM 10 MG/1
TABLET, FILM COATED ORAL
Qty: 90 TABLET | Refills: 1 | Status: SHIPPED | OUTPATIENT
Start: 2021-08-16 | End: 2021-12-27

## 2021-08-16 NOTE — TELEPHONE ENCOUNTER
Prescription approved per [facility] Refill Protocol.  Hamida Black RN

## 2021-09-08 ENCOUNTER — TRANSFERRED RECORDS (OUTPATIENT)
Dept: HEALTH INFORMATION MANAGEMENT | Facility: CLINIC | Age: 76
End: 2021-09-08

## 2021-09-08 LAB
ALT SERPL-CCNC: 17 IU/L (ref 5–40)
AST SERPL-CCNC: 18 U/L (ref 5–34)
CREATININE (EXTERNAL): 1.05 MG/DL (ref 0.5–1.3)

## 2021-09-25 ENCOUNTER — HEALTH MAINTENANCE LETTER (OUTPATIENT)
Age: 76
End: 2021-09-25

## 2021-10-09 DIAGNOSIS — N18.2 TYPE 2 DIABETES MELLITUS WITH STAGE 2 CHRONIC KIDNEY DISEASE, WITH LONG-TERM CURRENT USE OF INSULIN (H): ICD-10-CM

## 2021-10-09 DIAGNOSIS — Z79.4 TYPE 2 DIABETES MELLITUS WITH STAGE 2 CHRONIC KIDNEY DISEASE, WITH LONG-TERM CURRENT USE OF INSULIN (H): ICD-10-CM

## 2021-10-09 DIAGNOSIS — E11.22 TYPE 2 DIABETES MELLITUS WITH STAGE 2 CHRONIC KIDNEY DISEASE, WITH LONG-TERM CURRENT USE OF INSULIN (H): ICD-10-CM

## 2021-10-12 RX ORDER — LANCETS
EACH MISCELLANEOUS
Qty: 200 EACH | Refills: 5 | Status: SHIPPED | OUTPATIENT
Start: 2021-10-12 | End: 2022-11-16

## 2021-10-12 NOTE — TELEPHONE ENCOUNTER
Prescription approved per [facility] Refill Protocol.  Elva Calvin RN, BSN  Message handled by CLINIC NURSE.

## 2021-11-20 ENCOUNTER — HEALTH MAINTENANCE LETTER (OUTPATIENT)
Age: 76
End: 2021-11-20

## 2021-11-29 DIAGNOSIS — E11.22 TYPE 2 DIABETES MELLITUS WITH STAGE 2 CHRONIC KIDNEY DISEASE, WITH LONG-TERM CURRENT USE OF INSULIN (H): ICD-10-CM

## 2021-11-29 DIAGNOSIS — N18.2 TYPE 2 DIABETES MELLITUS WITH STAGE 2 CHRONIC KIDNEY DISEASE, WITH LONG-TERM CURRENT USE OF INSULIN (H): ICD-10-CM

## 2021-11-29 DIAGNOSIS — Z79.4 TYPE 2 DIABETES MELLITUS WITH STAGE 2 CHRONIC KIDNEY DISEASE, WITH LONG-TERM CURRENT USE OF INSULIN (H): ICD-10-CM

## 2021-12-06 ENCOUNTER — TRANSFERRED RECORDS (OUTPATIENT)
Dept: HEALTH INFORMATION MANAGEMENT | Facility: CLINIC | Age: 76
End: 2021-12-06
Payer: COMMERCIAL

## 2021-12-10 ENCOUNTER — TRANSFERRED RECORDS (OUTPATIENT)
Dept: HEALTH INFORMATION MANAGEMENT | Facility: CLINIC | Age: 76
End: 2021-12-10
Payer: COMMERCIAL

## 2021-12-10 LAB — RETINOPATHY: NEGATIVE

## 2021-12-20 ENCOUNTER — LAB (OUTPATIENT)
Dept: LAB | Facility: CLINIC | Age: 76
End: 2021-12-20
Payer: COMMERCIAL

## 2021-12-20 DIAGNOSIS — N18.1 TYPE 2 DIABETES MELLITUS WITH STAGE 1 CHRONIC KIDNEY DISEASE, WITH LONG-TERM CURRENT USE OF INSULIN (H): Primary | ICD-10-CM

## 2021-12-20 DIAGNOSIS — Z12.5 SCREENING FOR PROSTATE CANCER: ICD-10-CM

## 2021-12-20 DIAGNOSIS — E11.22 TYPE 2 DIABETES MELLITUS WITH STAGE 1 CHRONIC KIDNEY DISEASE, WITH LONG-TERM CURRENT USE OF INSULIN (H): Primary | ICD-10-CM

## 2021-12-20 DIAGNOSIS — Z79.4 TYPE 2 DIABETES MELLITUS WITH STAGE 1 CHRONIC KIDNEY DISEASE, WITH LONG-TERM CURRENT USE OF INSULIN (H): Primary | ICD-10-CM

## 2021-12-20 LAB
ANION GAP SERPL CALCULATED.3IONS-SCNC: 7 MMOL/L (ref 3–14)
BUN SERPL-MCNC: 26 MG/DL (ref 7–30)
CALCIUM SERPL-MCNC: 9.2 MG/DL (ref 8.5–10.1)
CHLORIDE BLD-SCNC: 105 MMOL/L (ref 94–109)
CO2 SERPL-SCNC: 26 MMOL/L (ref 20–32)
CREAT SERPL-MCNC: 1.17 MG/DL (ref 0.66–1.25)
GFR SERPL CREATININE-BSD FRML MDRD: 59 ML/MIN/1.73M2
GLUCOSE BLD-MCNC: 120 MG/DL (ref 70–99)
HBA1C MFR BLD: 6.2 % (ref 0–5.6)
POTASSIUM BLD-SCNC: 4.2 MMOL/L (ref 3.4–5.3)
PSA SERPL-MCNC: 0.81 UG/L (ref 0–4)
SODIUM SERPL-SCNC: 138 MMOL/L (ref 133–144)

## 2021-12-20 PROCEDURE — 80048 BASIC METABOLIC PNL TOTAL CA: CPT

## 2021-12-20 PROCEDURE — 83036 HEMOGLOBIN GLYCOSYLATED A1C: CPT

## 2021-12-20 PROCEDURE — 36415 COLL VENOUS BLD VENIPUNCTURE: CPT

## 2021-12-20 PROCEDURE — 80061 LIPID PANEL: CPT

## 2021-12-20 PROCEDURE — G0103 PSA SCREENING: HCPCS

## 2021-12-21 LAB
CHOLEST SERPL-MCNC: 110 MG/DL
FASTING STATUS PATIENT QL REPORTED: YES
HDLC SERPL-MCNC: 46 MG/DL
LDLC SERPL CALC-MCNC: 51 MG/DL
NONHDLC SERPL-MCNC: 64 MG/DL
TRIGL SERPL-MCNC: 65 MG/DL

## 2021-12-27 ENCOUNTER — OFFICE VISIT (OUTPATIENT)
Dept: PEDIATRICS | Facility: CLINIC | Age: 76
End: 2021-12-27
Payer: COMMERCIAL

## 2021-12-27 VITALS
TEMPERATURE: 96.3 F | OXYGEN SATURATION: 95 % | DIASTOLIC BLOOD PRESSURE: 64 MMHG | SYSTOLIC BLOOD PRESSURE: 122 MMHG | RESPIRATION RATE: 14 BRPM | WEIGHT: 200.4 LBS | BODY MASS INDEX: 24.92 KG/M2 | HEART RATE: 84 BPM

## 2021-12-27 DIAGNOSIS — E11.22 TYPE 2 DIABETES MELLITUS WITH STAGE 1 CHRONIC KIDNEY DISEASE, WITH LONG-TERM CURRENT USE OF INSULIN (H): Primary | ICD-10-CM

## 2021-12-27 DIAGNOSIS — Z13.6 CARDIOVASCULAR SCREENING; LDL GOAL LESS THAN 130: ICD-10-CM

## 2021-12-27 DIAGNOSIS — Z79.4 TYPE 2 DIABETES MELLITUS WITH STAGE 1 CHRONIC KIDNEY DISEASE, WITH LONG-TERM CURRENT USE OF INSULIN (H): Primary | ICD-10-CM

## 2021-12-27 DIAGNOSIS — M77.11 LATERAL EPICONDYLITIS OF RIGHT ELBOW: ICD-10-CM

## 2021-12-27 DIAGNOSIS — C34.91 CARCINOMA OF RIGHT LUNG (H): ICD-10-CM

## 2021-12-27 DIAGNOSIS — N18.1 TYPE 2 DIABETES MELLITUS WITH STAGE 1 CHRONIC KIDNEY DISEASE, WITH LONG-TERM CURRENT USE OF INSULIN (H): Primary | ICD-10-CM

## 2021-12-27 PROCEDURE — 99214 OFFICE O/P EST MOD 30 MIN: CPT | Performed by: INTERNAL MEDICINE

## 2021-12-27 RX ORDER — PREDNISONE 2.5 MG/1
1 TABLET ORAL EVERY 24 HOURS
COMMUNITY
Start: 2020-11-23 | End: 2022-07-27

## 2021-12-27 RX ORDER — ATORVASTATIN CALCIUM 10 MG/1
10 TABLET, FILM COATED ORAL DAILY
Qty: 90 TABLET | Refills: 4 | Status: SHIPPED | OUTPATIENT
Start: 2021-12-27 | End: 2022-02-16

## 2021-12-27 RX ORDER — LISINOPRIL 2.5 MG/1
2.5 TABLET ORAL DAILY
Qty: 90 TABLET | Refills: 4 | Status: SHIPPED | OUTPATIENT
Start: 2021-12-27 | End: 2022-02-16

## 2021-12-27 NOTE — PATIENT INSTRUCTIONS
Make an appointment with your dermatologist, Dr. Guerra      Patient Education     Understanding Lateral Epicondylitis     Tendons are strong bands of tissue that connect muscles to bones. Lateral epicondylitis affects the tendons that connect muscles in the forearm to the lateral epicondyle. This is the bony knob on the outer side of the elbow. The condition occurs if the extensor tendons of the wrist become painful and swollen (irritated). This can cause pain in the elbow, forearm, and wrist. Because the condition is sometimes caused by playing tennis, it's also known as  tennis elbow.     How to say it  LA-tuhr-[id number]   What causes lateral epicondylitis?  The condition most often occurs because of overuse. This can be from any activity that repeatedly puts stress on the forearm extensor muscles or tendons and wrist. For instance, playing tennis, lifting weights, cutting meat, painting, and typing can all cause the condition. Wear and tear of the tendons from aging or an injury to the tendons can also cause the condition.   Symptoms of lateral epicondylitis  The most common symptom is pain. You may feel it on the outer side of the elbow and down the back of the forearm. It may be worse when moving or using the elbow, forearm, or wrist. You may also feel pain when gripping or lifting things.   Treatment for lateral epicondylitis  Treatments may include:    Resting the elbow, forearm, and wrist. You ll need to avoid movements that can make your symptoms worse. You also may need to avoid certain sports and types of work for a time. This helps relieve symptoms and prevent further damage to the tendons.    Changing the action that caused the problem. For instance, if the tendons were damaged from playing tennis, it may help to change your playing technique or use different equipment. This helps prevent further damage to the tendons.    Using cold packs. Putting an ice pack on the injured area can help  reduce pain and swelling.    Taking pain medicines. Taking prescription or over-the-counter pain medicines may help reduce pain and swelling.      Wearing a brace. This helps reduce strain on the muscles and tendons in the forearm, which may relieve symptoms. It's very important to wear the brace properly.    Doing exercises and physical therapy. These help improve strength and range of motion in the elbow, forearm, and wrist.    Getting shots of medicine into the injured area. These may help relieve symptoms for a time.    Having surgery. This may be an option if other treatments fail to relieve symptoms. In many cases, the surgeon removes the damaged tissue.  Possible complications of lateral epicondylitis  If the tendons involved don t heal properly, symptoms may return or get worse. To help prevent this, follow your treatment plan as directed.   When to call your healthcare provider  Call your healthcare provider right away if you have any of these:    Fever of 100.4 F (38 C) or higher, or as directed by your provider    Chills    Redness, swelling, or warmth in the elbow or forearm that gets worse    Symptoms that don t get better with treatment, or get worse    New symptoms  [employer] last reviewed this educational content on 6/1/2019 2000-2021 [employer]. All rights reserved. This information is not intended as a substitute for professional medical care. Always follow your healthcare professional's instructions.           Patient Education     Treating Tennis Elbow    Your treatment will depend on how inflamed your tendon is. The goal is to ease your symptoms and help you regain full use of your elbow.  Rest and medicine  Wear a tennis elbow splint to let the inflamed tendon rest and heal. It must be worn correctly. It should be placed down the arm past the painful area of the elbow. It can make symptoms worse if it's directly over the inflamed tendon. Take stress off the tendon by using your  other hand or changing your . Take NSAIDs (nonsteroidal anti-inflammatory drugs) and use ice to ease pain and swelling.  Exercises and therapy  Your healthcare provider may give you an exercise program. He or she may send you to a physical therapist. That expert will teach you how to gently stretch and strengthen the muscles around your elbow.  Anti-inflammatory shots  Your healthcare provider may give you shots of an anti-inflammatory medicine such as cortisone. This helps ease swelling. You may have more pain at first. But your elbow should feel better in a few days.  If surgery is needed  If your symptoms go on for a long time, or other treatments don t work, your healthcare provider may recommend surgery. Surgery repairs the inflamed tendon.  Network last reviewed this educational content on 1/1/2018 2000-2021 [employer]. All rights reserved. This information is not intended as a substitute for professional medical care. Always follow your healthcare professional's instructions.           Patient Education     Ulnar Deviation (Strength)     This exercise is written for your right wrist . Switch sides for your left wrist .  1. Stand up straight. Hold a hand weight in your right hand. Your healthcare provider will tell you what size hand weight to use.  2. Keep your arm straight down at your side. Bend your wrist backward to lift the weight. Don t move your arm, only your wrist.  3. Hold for 5 seconds. Slowly lower your hand back down.  4. Repeat 5 to 10 times, or as instructed.  [employer] last reviewed this educational content on 6/1/2019 2000-2021 [employer]. All rights reserved. This information is not intended as a substitute for professional medical care. Always follow your healthcare professional's instructions.           Patient Education     Wrist Extension (Strength)     This exercise is written for your right forearm and wrist. Switch sides for your left forearm  and wrist.  5. Sit in a chair. Hold a hand weight in your right hand. Your healthcare provider will tell you what size of hand weight to use.  6. Lean your forearm on your thigh or a table. Hang your wrist off the end of your knee or edge of the table, palm down.  7. Keep your forearm in place and bend your wrist upward. Lift the hand weight as high as you can.  8. Slowly lower the hand weight back down.  9. Repeat 5 times, or as instructed.  [employer] last reviewed this educational content on 6/1/2019 2000-2021 [employer]. All rights reserved. This information is not intended as a substitute for professional medical care. Always follow your healthcare professional's instructions.           Patient Education     Wrist Flexion (Flexibility)    10. Stand or sit and hold your arms straight out in front of you.  11. Dangle your right hand at the wrist. Gently press down on your right hand with your left hand. Feel the stretch in your right wrist and the top of your hand.  12. Hold for 30 to 60 seconds, then relax.  13. Switch arms and repeat 2 times.   [employer] last reviewed this educational content on 6/1/2019 2000-2021 [employer]. All rights reserved. This information is not intended as a substitute for professional medical care. Always follow your healthcare professional's instructions.           Patient Education     Wrist Flexion (Strength)     This exercise is written for your right wrist. Switch sides for your left wrist .   14. Sit in a chair next to a table. Rest your right forearm on the table. Hang your right wrist off the edge of the table, palm up. Hold a hand weight in your right hand. Your healthcare provider will tell you what size of hand weight to use.  15. Keep your forearm in place and bend your wrist upward to lift the weight. Hold for 5 seconds.  16. Slowly lower the hand weight back down.  17. Repeat 5 times, or as instructed.  [employer] last reviewed this  educational content on 2/1/2020 2000-2021 [employer]. All rights reserved. This information is not intended as a substitute for professional medical care. Always follow your healthcare professional's instructions.

## 2021-12-27 NOTE — PROGRESS NOTES
Assessment & Plan     Type 2 diabetes mellitus with stage 1 chronic kidney disease, with long-term current use of insulin (H)  Doing well. Continuing to follow A1C  - **A1C FUTURE 6mo; Future  - **Basic metabolic panel FUTURE 6mo; Future    Lateral epicondylitis of right elbow  Reviewed causes, overuse likely. Reviewed handout and printed for him. Will try forearm strap  - Miscellaneous Order for DME - ONLY FOR DME    Carcinoma of right lung (H)  Reviewed recent notes from specialists.     CARDIOVASCULAR SCREENING; LDL GOAL LESS THAN 130  Refills done.  - lisinopril (ZESTRIL) 2.5 MG tablet; Take 1 tablet (2.5 mg) by mouth daily  - atorvastatin (LIPITOR) 10 MG tablet; Take 1 tablet (10 mg) by mouth daily       See Patient Instructions    Return in about 7 months (around 7/27/2022) for Preventive Visit.    Yenni Sewell MD  [facility] MADDIE Venegas is a 76 year old who presents for the following health issues:     History of Present Illness       He eats 2-3 servings of fruits and vegetables daily.He consumes 0 sweetened beverage(s) daily.He exercises with enough effort to increase his heart rate 60 or more minutes per day.  He exercises with enough effort to increase his heart rate 3 or less days per week.   He is taking medications regularly.     - Here for follow up on labs   - Here for a 6 month follow up to catch up with provider       Review of Systems   Constitutional, HEENT, cardiovascular, pulmonary, gi and gu systems are negative, except as otherwise noted.      Objective    /64 (BP Location: Right arm, Patient Position: Sitting, Cuff Size: Adult Regular)   Pulse 84   Temp (!) 96.3  F (35.7  C) (Tympanic)   Resp 14   Wt 90.9 kg (200 lb 6.4 oz)   SpO2 95%   BMI 24.92 kg/m    Body mass index is 24.92 kg/m .  Physical Exam   GENERAL: healthy, alert and no distress  NECK: no adenopathy, no asymmetry, masses, or scars and thyroid normal to palpation  RESP: lungs clear  to auscultation - no rales, rhonchi or wheezes  CV: regular rate and rhythm, normal S1 S2, no S3 or S4, no murmur, click or rub, no peripheral edema and peripheral pulses strong  ABDOMEN: soft, nontender, no hepatosplenomegaly, no masses and bowel sounds normal  MS: tenderness to palpation over R lateral epicondyle. Full ROM elbow. No bruising or edema.

## 2022-01-10 ENCOUNTER — HOSPITAL ENCOUNTER (OUTPATIENT)
Dept: CT IMAGING | Facility: CLINIC | Age: 77
Discharge: HOME | End: 2022-01-10
Attending: THORACIC SURGERY (CARDIOTHORACIC VASCULAR SURGERY) | Admitting: THORACIC SURGERY (CARDIOTHORACIC VASCULAR SURGERY)
Payer: COMMERCIAL

## 2022-01-10 DIAGNOSIS — R91.1 LUNG NODULE: ICD-10-CM

## 2022-01-10 DIAGNOSIS — J43.9 EMPHYSEMA OF LUNG (H): ICD-10-CM

## 2022-01-10 PROCEDURE — 71250 CT THORAX DX C-: CPT

## 2022-02-08 ENCOUNTER — TRANSFERRED RECORDS (OUTPATIENT)
Dept: HEALTH INFORMATION MANAGEMENT | Facility: CLINIC | Age: 77
End: 2022-02-08
Payer: COMMERCIAL

## 2022-02-15 DIAGNOSIS — Z13.6 CARDIOVASCULAR SCREENING; LDL GOAL LESS THAN 130: ICD-10-CM

## 2022-02-16 RX ORDER — LISINOPRIL 2.5 MG/1
2.5 TABLET ORAL DAILY
Qty: 90 TABLET | Refills: 1 | Status: SHIPPED | OUTPATIENT
Start: 2022-02-16 | End: 2022-07-27

## 2022-02-16 RX ORDER — ATORVASTATIN CALCIUM 10 MG/1
10 TABLET, FILM COATED ORAL DAILY
Qty: 90 TABLET | Refills: 1 | Status: SHIPPED | OUTPATIENT
Start: 2022-02-16 | End: 2022-07-27

## 2022-03-07 ENCOUNTER — TRANSFERRED RECORDS (OUTPATIENT)
Dept: HEALTH INFORMATION MANAGEMENT | Facility: CLINIC | Age: 77
End: 2022-03-07
Payer: COMMERCIAL

## 2022-03-07 LAB
ALT SERPL-CCNC: 18 IU/L (ref 5–40)
AST SERPL-CCNC: 17 U/L (ref 5–34)
CREATININE (EXTERNAL): 1.1 MG/DL (ref 0.5–1.3)

## 2022-06-06 ENCOUNTER — TRANSFERRED RECORDS (OUTPATIENT)
Dept: HEALTH INFORMATION MANAGEMENT | Facility: CLINIC | Age: 77
End: 2022-06-06
Payer: COMMERCIAL

## 2022-06-06 ENCOUNTER — TRANSFERRED RECORDS (OUTPATIENT)
Dept: PEDIATRICS | Facility: CLINIC | Age: 77
End: 2022-06-06

## 2022-06-06 LAB
ALT SERPL-CCNC: 18 IU/L (ref 5–40)
AST SERPL-CCNC: 18 U/L (ref 5–34)
CREATININE (EXTERNAL): 1.05 MG/DL (ref 0.5–1.3)
GFR ESTIMATED (EXTERNAL): 72.6 ML/MIN/1.73M2

## 2022-07-02 ENCOUNTER — HEALTH MAINTENANCE LETTER (OUTPATIENT)
Age: 77
End: 2022-07-02

## 2022-07-20 ENCOUNTER — LAB (OUTPATIENT)
Dept: LAB | Facility: CLINIC | Age: 77
End: 2022-07-20
Payer: COMMERCIAL

## 2022-07-20 DIAGNOSIS — Z79.4 TYPE 2 DIABETES MELLITUS WITH STAGE 1 CHRONIC KIDNEY DISEASE, WITH LONG-TERM CURRENT USE OF INSULIN (H): ICD-10-CM

## 2022-07-20 DIAGNOSIS — E11.22 TYPE 2 DIABETES MELLITUS WITH STAGE 1 CHRONIC KIDNEY DISEASE, WITH LONG-TERM CURRENT USE OF INSULIN (H): ICD-10-CM

## 2022-07-20 DIAGNOSIS — N18.1 TYPE 2 DIABETES MELLITUS WITH STAGE 1 CHRONIC KIDNEY DISEASE, WITH LONG-TERM CURRENT USE OF INSULIN (H): ICD-10-CM

## 2022-07-20 LAB
ANION GAP SERPL CALCULATED.3IONS-SCNC: 4 MMOL/L (ref 3–14)
BUN SERPL-MCNC: 22 MG/DL (ref 7–30)
CALCIUM SERPL-MCNC: 9.6 MG/DL (ref 8.5–10.1)
CHLORIDE BLD-SCNC: 105 MMOL/L (ref 94–109)
CO2 SERPL-SCNC: 29 MMOL/L (ref 20–32)
CREAT SERPL-MCNC: 1.07 MG/DL (ref 0.66–1.25)
GFR SERPL CREATININE-BSD FRML MDRD: 71 ML/MIN/1.73M2
GLUCOSE BLD-MCNC: 126 MG/DL (ref 70–99)
HBA1C MFR BLD: 6.2 % (ref 0–5.6)
POTASSIUM BLD-SCNC: 4.3 MMOL/L (ref 3.4–5.3)
SODIUM SERPL-SCNC: 138 MMOL/L (ref 133–144)

## 2022-07-20 PROCEDURE — 36415 COLL VENOUS BLD VENIPUNCTURE: CPT

## 2022-07-20 PROCEDURE — 83036 HEMOGLOBIN GLYCOSYLATED A1C: CPT

## 2022-07-20 PROCEDURE — 80048 BASIC METABOLIC PNL TOTAL CA: CPT

## 2022-07-20 SDOH — HEALTH STABILITY: PHYSICAL HEALTH: ON AVERAGE, HOW MANY DAYS PER WEEK DO YOU ENGAGE IN MODERATE TO STRENUOUS EXERCISE (LIKE A BRISK WALK)?: 3 DAYS

## 2022-07-20 SDOH — HEALTH STABILITY: PHYSICAL HEALTH: ON AVERAGE, HOW MANY MINUTES DO YOU ENGAGE IN EXERCISE AT THIS LEVEL?: 30 MIN

## 2022-07-20 SDOH — ECONOMIC STABILITY: INCOME INSECURITY: HOW HARD IS IT FOR YOU TO PAY FOR THE VERY BASICS LIKE FOOD, HOUSING, MEDICAL CARE, AND HEATING?: NOT HARD AT ALL

## 2022-07-20 SDOH — ECONOMIC STABILITY: FOOD INSECURITY: WITHIN THE PAST 12 MONTHS, YOU WORRIED THAT YOUR FOOD WOULD RUN OUT BEFORE YOU GOT MONEY TO BUY MORE.: NEVER TRUE

## 2022-07-20 SDOH — ECONOMIC STABILITY: INCOME INSECURITY: IN THE LAST 12 MONTHS, WAS THERE A TIME WHEN YOU WERE NOT ABLE TO PAY THE MORTGAGE OR RENT ON TIME?: NO

## 2022-07-20 SDOH — ECONOMIC STABILITY: FOOD INSECURITY: WITHIN THE PAST 12 MONTHS, THE FOOD YOU BOUGHT JUST DIDN'T LAST AND YOU DIDN'T HAVE MONEY TO GET MORE.: NEVER TRUE

## 2022-07-20 ASSESSMENT — ENCOUNTER SYMPTOMS
ARTHRALGIAS: 0
NAUSEA: 0
HEARTBURN: 0
DIARRHEA: 0
PALPITATIONS: 0
MYALGIAS: 0
CHILLS: 0
PARESTHESIAS: 0
FEVER: 0
HEMATOCHEZIA: 0
CONSTIPATION: 0
HEADACHES: 0
ABDOMINAL PAIN: 0
SHORTNESS OF BREATH: 1
SORE THROAT: 0
WEAKNESS: 0
JOINT SWELLING: 0
HEMATURIA: 0
FREQUENCY: 0
NERVOUS/ANXIOUS: 0
EYE PAIN: 0
COUGH: 0
DYSURIA: 0
DIZZINESS: 0

## 2022-07-20 ASSESSMENT — LIFESTYLE VARIABLES
AUDIT-C TOTAL SCORE: 0
SKIP TO QUESTIONS 9-10: 1
HOW MANY STANDARD DRINKS CONTAINING ALCOHOL DO YOU HAVE ON A TYPICAL DAY: PATIENT DOES NOT DRINK
HOW OFTEN DO YOU HAVE SIX OR MORE DRINKS ON ONE OCCASION: NEVER
HOW OFTEN DO YOU HAVE A DRINK CONTAINING ALCOHOL: NEVER

## 2022-07-20 ASSESSMENT — SOCIAL DETERMINANTS OF HEALTH (SDOH)
ARE YOU MARRIED, WIDOWED, DIVORCED, SEPARATED, NEVER MARRIED, OR LIVING WITH A PARTNER?: NEVER MARRIED
DO YOU BELONG TO ANY CLUBS OR ORGANIZATIONS SUCH AS CHURCH GROUPS UNIONS, FRATERNAL OR ATHLETIC GROUPS, OR SCHOOL GROUPS?: YES
HOW OFTEN DO YOU GET TOGETHER WITH FRIENDS OR RELATIVES?: ONCE A WEEK
HOW OFTEN DO YOU ATTEND CHURCH OR RELIGIOUS SERVICES?: NEVER
IN A TYPICAL WEEK, HOW MANY TIMES DO YOU TALK ON THE PHONE WITH FAMILY, FRIENDS, OR NEIGHBORS?: NEVER

## 2022-07-20 ASSESSMENT — ACTIVITIES OF DAILY LIVING (ADL): CURRENT_FUNCTION: NO ASSISTANCE NEEDED

## 2022-07-27 ENCOUNTER — TRANSFERRED RECORDS (OUTPATIENT)
Dept: PEDIATRICS | Facility: CLINIC | Age: 77
End: 2022-07-27

## 2022-07-27 ENCOUNTER — ANCILLARY PROCEDURE (OUTPATIENT)
Dept: CT IMAGING | Facility: CLINIC | Age: 77
End: 2022-07-27
Attending: THORACIC SURGERY (CARDIOTHORACIC VASCULAR SURGERY)
Payer: COMMERCIAL

## 2022-07-27 ENCOUNTER — OFFICE VISIT (OUTPATIENT)
Dept: PEDIATRICS | Facility: CLINIC | Age: 77
End: 2022-07-27
Payer: COMMERCIAL

## 2022-07-27 VITALS
BODY MASS INDEX: 25.8 KG/M2 | HEIGHT: 74 IN | SYSTOLIC BLOOD PRESSURE: 120 MMHG | RESPIRATION RATE: 20 BRPM | TEMPERATURE: 98.3 F | WEIGHT: 201 LBS | HEART RATE: 88 BPM | OXYGEN SATURATION: 95 % | DIASTOLIC BLOOD PRESSURE: 66 MMHG

## 2022-07-27 DIAGNOSIS — E11.22 TYPE 2 DIABETES MELLITUS WITH STAGE 1 CHRONIC KIDNEY DISEASE, WITH LONG-TERM CURRENT USE OF INSULIN (H): ICD-10-CM

## 2022-07-27 DIAGNOSIS — N18.1 TYPE 2 DIABETES MELLITUS WITH STAGE 1 CHRONIC KIDNEY DISEASE, WITH LONG-TERM CURRENT USE OF INSULIN (H): ICD-10-CM

## 2022-07-27 DIAGNOSIS — Z12.5 SCREENING FOR PROSTATE CANCER: ICD-10-CM

## 2022-07-27 DIAGNOSIS — J44.9 STAGE 3 SEVERE COPD BY GOLD CLASSIFICATION (H): ICD-10-CM

## 2022-07-27 DIAGNOSIS — Z00.00 ENCOUNTER FOR MEDICARE ANNUAL WELLNESS EXAM: Primary | ICD-10-CM

## 2022-07-27 DIAGNOSIS — Z13.6 CARDIOVASCULAR SCREENING; LDL GOAL LESS THAN 130: ICD-10-CM

## 2022-07-27 DIAGNOSIS — C34.91 CARCINOMA OF RIGHT LUNG (H): ICD-10-CM

## 2022-07-27 DIAGNOSIS — Z79.4 TYPE 2 DIABETES MELLITUS WITH STAGE 1 CHRONIC KIDNEY DISEASE, WITH LONG-TERM CURRENT USE OF INSULIN (H): ICD-10-CM

## 2022-07-27 DIAGNOSIS — R12 HEARTBURN: ICD-10-CM

## 2022-07-27 DIAGNOSIS — R91.1 SOLITARY LUNG NODULE: ICD-10-CM

## 2022-07-27 LAB
CREAT UR-MCNC: 54 MG/DL
MICROALBUMIN UR-MCNC: 9 MG/L
MICROALBUMIN/CREAT UR: 16.67 MG/G CR (ref 0–17)

## 2022-07-27 PROCEDURE — 82043 UR ALBUMIN QUANTITATIVE: CPT | Performed by: INTERNAL MEDICINE

## 2022-07-27 PROCEDURE — 99207 PR FOOT EXAM NO CHARGE: CPT | Mod: 25 | Performed by: INTERNAL MEDICINE

## 2022-07-27 PROCEDURE — G0438 PPPS, INITIAL VISIT: HCPCS | Performed by: INTERNAL MEDICINE

## 2022-07-27 PROCEDURE — 71250 CT THORAX DX C-: CPT

## 2022-07-27 PROCEDURE — 99214 OFFICE O/P EST MOD 30 MIN: CPT | Mod: 25 | Performed by: INTERNAL MEDICINE

## 2022-07-27 RX ORDER — METFORMIN HCL 500 MG
1500 TABLET, EXTENDED RELEASE 24 HR ORAL
Qty: 270 TABLET | Refills: 11 | Status: SHIPPED | OUTPATIENT
Start: 2022-07-27 | End: 2023-07-31

## 2022-07-27 RX ORDER — LISINOPRIL 2.5 MG/1
2.5 TABLET ORAL DAILY
Qty: 90 TABLET | Refills: 1 | Status: SHIPPED | OUTPATIENT
Start: 2022-07-27 | End: 2023-02-09

## 2022-07-27 RX ORDER — FAMOTIDINE 20 MG/1
20 TABLET, FILM COATED ORAL 2 TIMES DAILY
Qty: 180 TABLET | Refills: 4 | Status: SHIPPED | OUTPATIENT
Start: 2022-07-27 | End: 2023-07-31

## 2022-07-27 RX ORDER — ATORVASTATIN CALCIUM 10 MG/1
10 TABLET, FILM COATED ORAL DAILY
Qty: 90 TABLET | Refills: 1 | Status: SHIPPED | OUTPATIENT
Start: 2022-07-27 | End: 2023-02-20

## 2022-07-27 ASSESSMENT — ENCOUNTER SYMPTOMS
HEARTBURN: 0
FREQUENCY: 0
SHORTNESS OF BREATH: 1
CHILLS: 0
DYSURIA: 0
HEADACHES: 0
ARTHRALGIAS: 0
FEVER: 0
NERVOUS/ANXIOUS: 0
ABDOMINAL PAIN: 0
PARESTHESIAS: 0
SORE THROAT: 0
EYE PAIN: 0
WEAKNESS: 0
PALPITATIONS: 0
HEMATURIA: 0
JOINT SWELLING: 0
COUGH: 0
CONSTIPATION: 0
DIZZINESS: 0
NAUSEA: 0
DIARRHEA: 0
MYALGIAS: 0
HEMATOCHEZIA: 0

## 2022-07-27 ASSESSMENT — ACTIVITIES OF DAILY LIVING (ADL): CURRENT_FUNCTION: NO ASSISTANCE NEEDED

## 2022-07-27 ASSESSMENT — PAIN SCALES - GENERAL: PAINLEVEL: NO PAIN (0)

## 2022-07-27 NOTE — PROGRESS NOTES
The patient was provided with suggestions to help him develop a healthy physical lifestyle.  The patient was provided with written information regarding signs of hearing loss.

## 2022-07-27 NOTE — PROGRESS NOTES
"SUBJECTIVE:   Lauri Mcgee is a 76 year old male who presents for Preventive Visit.      Patient has been advised of split billing requirements and indicates understanding: Yes  Are you in the first 12 months of your Medicare coverage?  No    Healthy Habits:     In general, how would you rate your overall health?  Fair    Frequency of exercise:  4-5 days/week    Duration of exercise:  30-45 minutes    Do you usually eat at least 4 servings of fruit and vegetables a day, include whole grains    & fiber and avoid regularly eating high fat or \"junk\" foods?  Yes    Taking medications regularly:  Yes    Medication side effects:  No significant flushing and Muscle aches    Ability to successfully perform activities of daily living:  No assistance needed    Home Safety:  Throw rugs in the hallway and lack of grab bars in the bathroom    Hearing Impairment:  Difficulty following a conversation in a noisy restaurant or crowded room and need to ask people to speak up or repeat themselves    In the past 6 months, have you been bothered by leaking of urine?  No    In general, how would you rate your overall mental or emotional health?  Excellent      PHQ-2 Total Score: 0    Additional concerns today:  No    Do you feel safe in your environment? Yes    Have you ever done Advance Care Planning? (For example, a Health Directive, POLST, or a discussion with a medical provider or your loved ones about your wishes): Yes, advance care planning is on file.       Fall risk  Fallen 2 or more times in the past year?: No  Any fall with injury in the past year?: No    Cognitive Screening   1) Repeat 3 items (Leader, Season, Table)    2) Clock draw: NORMAL  3) 3 item recall: Recalls 3 objects  Results: 3 items recalled: COGNITIVE IMPAIRMENT LESS LIKELY    Mini-CogTM Copyright MATILDA Farah. Licensed by the author for use in [facility]; reprinted with permission (lissette@.[facility]). All rights reserved.      Do you have sleep apnea, " excessive snoring or daytime drowsiness?: no    Reviewed and updated as needed this visit by clinical staff   Tobacco  Allergies    Med Hx  Surg Hx  Fam Hx  Soc Hx          Reviewed and updated as needed this visit by Provider                   Social History     Tobacco Use     Smoking status: Former Smoker     Packs/day: 0.50     Years: 47.00     Pack years: 23.50     Types: Cigarettes, Cigars, Pipe     Start date: 3/1/1966     Quit date: 10/1/2013     Years since quittin.8     Smokeless tobacco: Never Used     Tobacco comment: occ   Substance Use Topics     Alcohol use: Yes     Alcohol/week: 0.0 standard drinks     Comment: None since 2006         Alcohol Use 2022   Prescreen: >3 drinks/day or >7 drinks/week? No   Prescreen: >3 drinks/day or >7 drinks/week? -     Follows with [facility] and radiation oncology. Currently monitoring.    Diabetes Follow-up    How often are you checking your blood sugar? One time daily  What time of day are you checking your blood sugars (select all that apply)?  varies  Have you had any blood sugars above 200?  No  Have you had any blood sugars below 70?  No    What symptoms do you notice when your blood sugar is low?  None    What concerns do you have today about your diabetes? None     Do you have any of these symptoms? (Select all that apply)  Numbness in feet      Hyperlipidemia Follow-Up      Are you regularly taking any medication or supplement to lower your cholesterol?   Yes- lipitor    Are you having muscle aches or other side effects that you think could be caused by your cholesterol lowering medication?  No    Hypertension Follow-up      Do you check your blood pressure regularly outside of the clinic? No     Are you following a low salt diet? No    Are your blood pressures ever more than 140 on the top number (systolic) OR more   than 90 on the bottom number (diastolic), for example 140/90? Not taking    BP Readings from Last 2 Encounters:   22  120/66   12/27/21 122/64     Hemoglobin A1C POCT (%)   Date Value   04/26/2021 6.0 (H)   09/22/2020 5.7 (H)     Hemoglobin A1C (%)   Date Value   07/20/2022 6.2 (H)   12/20/2021 6.2 (H)     LDL Cholesterol Calculated (mg/dL)   Date Value   12/20/2021 51   09/22/2020 44   07/17/2019 54       COPD Follow-Up    Overall, how are your COPD symptoms since your last clinic visit?  No change    How much fatigue or shortness of breath do you have when you are walking?  Same as usual    How much shortness of breath do you have when you are resting?  Same as usual    How often do you cough? Rarely    Have you noticed any change in your sputum/phlegm?  No    Have you experienced a recent fever? No    Please describe how far you can walk without stopping to rest:  2-5 blocks    How many flights of stairs are you able to walk up without stopping?  2    Have you had any Emergency Room Visits, Urgent Care Visits, or Hospital Admissions because of your COPD since your last office visit?  No    History   Smoking Status     Former Smoker     Packs/day: 0.50     Years: 47.00     Types: Cigarettes, Cigars, Pipe     Start date: 3/1/1966     Quit date: 10/1/2013   Smokeless Tobacco     Never Used     Comment: occ     No results found for: FEV1, DLQ5NBF      Current providers sharing in care for this patient include:   Patient Care Team:  Yenni Sewell MD as PCP - General  Yenni Sewell MD as Assigned PCP  Elva Nguyễn RD as Diabetes Educator (Dietitian, Registered)  Jose D Campoverde MD as MD (Internal Medicine)  Jerome Rasheed MD as MD (Urology)  Maci Reynolds as Pulmonologist (Physician Assistant)  Abby Guerra (Dermatology)  David Valenzuela Nicole D, STEPHANIE as Personal Advocate & Liaison (PAL)    The following health maintenance items are reviewed in Epic and correct as of today:  Health Maintenance Due   Topic Date Due     MICROALBUMIN  04/26/2022     Labs reviewed in EPIC        Review  "of Systems   Constitutional: Negative for chills and fever.   HENT: Negative for congestion, ear pain, hearing loss and sore throat.    Eyes: Negative for pain and visual disturbance.   Respiratory: Positive for shortness of breath. Negative for cough.    Cardiovascular: Positive for peripheral edema. Negative for chest pain and palpitations.   Gastrointestinal: Negative for abdominal pain, constipation, diarrhea, heartburn, hematochezia and nausea.   Genitourinary: Negative for dysuria, frequency, genital sores, hematuria, impotence, penile discharge and urgency.   Musculoskeletal: Negative for arthralgias, joint swelling and myalgias.   Skin: Negative for rash.   Neurological: Negative for dizziness, weakness, headaches and paresthesias.   Psychiatric/Behavioral: Negative for mood changes. The patient is not nervous/anxious.          OBJECTIVE:   /66 (BP Location: Right arm, Patient Position: Sitting, Cuff Size: Adult Large)   Pulse 88   Temp 98.3  F (36.8  C) (Tympanic)   Resp 20   Ht 1.885 m (6' 2.21\")   Wt 91.2 kg (201 lb)   SpO2 95%   BMI 25.66 kg/m   Estimated body mass index is 25.66 kg/m  as calculated from the following:    Height as of this encounter: 1.885 m (6' 2.21\").    Weight as of this encounter: 91.2 kg (201 lb).  Physical Exam  GENERAL: healthy, alert and no distress  EYES: Eyes grossly normal to inspection, PERRL and conjunctivae and sclerae normal  NECK: no adenopathy, no asymmetry, masses, or scars and thyroid normal to palpation  RESP: lungs clear to auscultation - no rales, rhonchi or wheezes  CV: regular rate and rhythm, normal S1 S2, no S3 or S4, no murmur, click or rub, no peripheral edema and peripheral pulses strong  ABDOMEN: soft, nontender, no hepatosplenomegaly, no masses and bowel sounds normal  MS: no gross musculoskeletal defects noted, no edema  SKIN: no suspicious lesions or rashes  NEURO: Normal strength and tone, mentation intact and speech normal  PSYCH: mentation " "appears normal, affect normal/bright  FOOT: no skin lesions, DP/PT pulses 2+ and symmetric. Sensory exam grossly normal.    Diagnostic Test Results:  Labs reviewed in Epic    ASSESSMENT / PLAN:     1. Encounter for Medicare annual wellness exam      2. Carcinoma of right lung (H)  Follows with oncology, completed radiation therapy    3. Stage 3 severe COPD by GOLD classification (H)  Stable. Sees pulmonary    4. Type 2 diabetes mellitus with stage 1 chronic kidney disease, with long-term current use of insulin (H)  Doing well.   - Albumin Random Urine Quantitative with Creat Ratio; Future  - FOOT EXAM  - metFORMIN (GLUCOPHAGE XR) 500 MG 24 hr tablet; Take 3 tablets (1,500 mg) by mouth daily (with dinner)  Dispense: 270 tablet; Refill: 11  - Hemoglobin A1c; Future  - Albumin Random Urine Quantitative with Creat Ratio    5. Heartburn    - famotidine (PEPCID) 20 MG tablet; Take 1 tablet (20 mg) by mouth 2 times daily  Dispense: 180 tablet; Refill: 4    6. CARDIOVASCULAR SCREENING; LDL GOAL LESS THAN 130    - lisinopril (ZESTRIL) 2.5 MG tablet; Take 1 tablet (2.5 mg) by mouth daily  Dispense: 90 tablet; Refill: 1  - atorvastatin (LIPITOR) 10 MG tablet; Take 1 tablet (10 mg) by mouth daily  Dispense: 90 tablet; Refill: 1  - Lipid panel reflex to direct LDL Fasting; Future    7. Screening for prostate cancer    - PSA, screen; Future      COUNSELING:  Reviewed preventive health counseling, as reflected in patient instructions    Estimated body mass index is 25.66 kg/m  as calculated from the following:    Height as of this encounter: 1.885 m (6' 2.21\").    Weight as of this encounter: 91.2 kg (201 lb).        He reports that he quit smoking about 8 years ago. His smoking use included cigarettes, cigars, and pipe. He started smoking about 56 years ago. He has a 23.50 pack-year smoking history. He has never used smokeless tobacco.      Appropriate preventive services were discussed with this patient, including applicable " screening as appropriate for cardiovascular disease, diabetes, osteopenia/osteoporosis, and glaucoma.  As appropriate for age/gender, discussed screening for colorectal cancer, prostate cancer, breast cancer, and cervical cancer. Checklist reviewing preventive services available has been given to the patient.    Reviewed patients plan of care and provided an AVS. The Basic Care Plan (routine screening as documented in Health Maintenance) for Lauri meets the Care Plan requirement. This Care Plan has been established and reviewed with the Patient.    Counseling Resources:  ATP IV Guidelines  Pooled Cohorts Equation Calculator  Breast Cancer Risk Calculator  Breast Cancer: Medication to Reduce Risk  FRAX Risk Assessment  ICSI Preventive Guidelines  Dietary Guidelines for Americans, 2010  USDA's MyPlate  ASA Prophylaxis  Lung CA Screening    Yenni Sewell MD  [facility]    Identified Health Risks:

## 2022-07-27 NOTE — PATIENT INSTRUCTIONS
Patient Education   Personalized Prevention Plan  You are due for the preventive services outlined below.  Your care team is available to assist you in scheduling these services.  If you have already completed any of these items, please share that information with your care team to update in your medical record.  Health Maintenance Due   Topic Date Due     Kidney Microalbumin Urine Test  04/26/2022     Diabetic Foot Exam  06/30/2022     ANNUAL REVIEW OF HM ORDERS  06/30/2022     Your Health Risk Assessment indicates you feel you are not in good health    A healthy lifestyle helps keep the body fit and the mind alert. It helps protect you from disease, helps you fight disease, and helps prevent chronic disease (disease that doesn't go away) from getting worse. This is important as you get older and begin to notice twinges in muscles and joints and a decline in the strength and stamina you once took for granted. A healthy lifestyle includes good healthcare, good nutrition, weight control, recreation, and regular exercise. Avoid harmful substances and do what you can to keep safe. Another part of a healthy lifestyle is stay mentally active and socially involved.    Good healthcare     Have a wellness visit every year.     If you have new symptoms, let us know right away. Don't wait until the next checkup.     Take medicines exactly as prescribed and keep your medicines in a safe place. Tell us if your medicine causes problems.   Healthy diet and weight control     Eat 3 or 4 small, nutritious, low-fat, high-fiber meals a day. Include a variety of fruits, vegetables, and whole-grain foods.     Make sure you get enough calcium in your diet. Calcium, vitamin D, and exercise help prevent osteoporosis (bone thinning).     If you live alone, try eating with others when you can. That way you get a good meal and have company while you eat it.     Try to keep a healthy weight. If you eat more calories than your body uses for  energy, it will be stored as fat and you will gain weight.     Recreation   Recreation is not limited to sports and team events. It includes any activity that provides relaxation, interest, enjoyment, and exercise. Recreation provides an outlet for physical, mental, and social energy. It can give a sense of worth and achievement. It can help you stay healthy.    Mental Exercise and Social Involvement  Mental and emotional health is as important as physical health. Keep in touch with friends and family. Stay as active as possible. Continue to learn and challenge yourself.   Things you can do to stay mentally active are:    Learn something new, like a foreign language or musical instrument.     Play SCRABBLE or do crossword puzzles. If you cannot find people to play these games with you at home, you can play them with others on your computer through the Internet.     Join a games club--anything from card games to chess or checkers or lawn bowling.     Start a new hobby.     Go back to school.     Volunteer.     Read.   Keep up with world events.    Signs of Hearing Loss      Hearing much better with one ear can be a sign of hearing loss.   Hearing loss is a problem shared by many people. In fact, it is one of the most common health problems, particularly as people age. Most people age 65 and older have some hearing loss. By age 80, almost everyone does. Hearing loss often occurs slowly over the years. So you may not realize your hearing has gotten worse.  Have your hearing checked  Call your healthcare provider if you:    Have to strain to hear normal conversation    Have to watch other people s faces very carefully to follow what they re saying    Need to ask people to repeat what they ve said    Often misunderstand what people are saying    Turn the volume of the television or radio up so high that others complain    Feel that people are mumbling when they re talking to you    Find that the effort to hear leaves you  feeling tired and irritated    Notice, when using the phone, that you hear better with one ear than the other  [employer] last reviewed this educational content on 1/1/2020 2000-2021 [employer]. All rights reserved. This information is not intended as a substitute for professional medical care. Always follow your healthcare professional's instructions.

## 2022-08-11 DIAGNOSIS — R12 HEARTBURN: ICD-10-CM

## 2022-08-16 RX ORDER — FAMOTIDINE 20 MG/1
TABLET, FILM COATED ORAL
Qty: 180 TABLET | Refills: 0 | OUTPATIENT
Start: 2022-08-16

## 2022-09-03 DIAGNOSIS — E11.22 TYPE 2 DIABETES MELLITUS WITH STAGE 2 CHRONIC KIDNEY DISEASE, WITH LONG-TERM CURRENT USE OF INSULIN (H): ICD-10-CM

## 2022-09-03 DIAGNOSIS — Z79.4 TYPE 2 DIABETES MELLITUS WITH STAGE 2 CHRONIC KIDNEY DISEASE, WITH LONG-TERM CURRENT USE OF INSULIN (H): ICD-10-CM

## 2022-09-03 DIAGNOSIS — N18.2 TYPE 2 DIABETES MELLITUS WITH STAGE 2 CHRONIC KIDNEY DISEASE, WITH LONG-TERM CURRENT USE OF INSULIN (H): ICD-10-CM

## 2022-09-07 ENCOUNTER — TRANSFERRED RECORDS (OUTPATIENT)
Dept: PEDIATRICS | Facility: CLINIC | Age: 77
End: 2022-09-07

## 2022-09-07 LAB
ALT SERPL-CCNC: 20 IU/L (ref 5–40)
AST SERPL-CCNC: 23 U/L (ref 5–34)
CREATININE (EXTERNAL): 1.18 MG/DL (ref 0.5–1.3)

## 2022-10-31 ENCOUNTER — TRANSFERRED RECORDS (OUTPATIENT)
Dept: HEALTH INFORMATION MANAGEMENT | Facility: CLINIC | Age: 77
End: 2022-10-31

## 2022-11-16 DIAGNOSIS — Z79.4 TYPE 2 DIABETES MELLITUS WITH STAGE 2 CHRONIC KIDNEY DISEASE, WITH LONG-TERM CURRENT USE OF INSULIN (H): ICD-10-CM

## 2022-11-16 DIAGNOSIS — E11.22 TYPE 2 DIABETES MELLITUS WITH STAGE 2 CHRONIC KIDNEY DISEASE, WITH LONG-TERM CURRENT USE OF INSULIN (H): ICD-10-CM

## 2022-11-16 DIAGNOSIS — N18.2 TYPE 2 DIABETES MELLITUS WITH STAGE 2 CHRONIC KIDNEY DISEASE, WITH LONG-TERM CURRENT USE OF INSULIN (H): ICD-10-CM

## 2022-11-16 RX ORDER — LANCETS
EACH MISCELLANEOUS
Qty: 100 EACH | Refills: 8 | Status: SHIPPED | OUTPATIENT
Start: 2022-11-16 | End: 2023-06-23

## 2022-11-16 NOTE — TELEPHONE ENCOUNTER
Prescription approved per [facility] Refill Protocol.  Elva Calvin RN, BSN  [facility]

## 2022-12-05 ENCOUNTER — TRANSFERRED RECORDS (OUTPATIENT)
Dept: HEALTH INFORMATION MANAGEMENT | Facility: CLINIC | Age: 77
End: 2022-12-05

## 2022-12-05 LAB
ALT SERPL-CCNC: 20 IU/L (ref 5–40)
AST SERPL-CCNC: 20 U/L (ref 5–34)
CREATININE (EXTERNAL): 1.07 MG/DL (ref 0.5–1.3)

## 2022-12-24 NOTE — TELEPHONE ENCOUNTER
Prescription approved per [facility] Refill Protocol.  Nichole Lewis RN     no

## 2023-02-06 ENCOUNTER — ANCILLARY PROCEDURE (OUTPATIENT)
Dept: CT IMAGING | Facility: CLINIC | Age: 78
End: 2023-02-06
Attending: THORACIC SURGERY (CARDIOTHORACIC VASCULAR SURGERY)
Payer: COMMERCIAL

## 2023-02-06 ENCOUNTER — TRANSFERRED RECORDS (OUTPATIENT)
Dept: PEDIATRICS | Facility: CLINIC | Age: 78
End: 2023-02-06

## 2023-02-06 DIAGNOSIS — R91.1 SOLITARY LUNG NODULE: ICD-10-CM

## 2023-02-06 PROCEDURE — 71250 CT THORAX DX C-: CPT

## 2023-02-08 ENCOUNTER — TRANSCRIBE ORDERS (OUTPATIENT)
Dept: OTHER | Age: 78
End: 2023-02-08

## 2023-02-08 DIAGNOSIS — Z13.6 CARDIOVASCULAR SCREENING; LDL GOAL LESS THAN 130: ICD-10-CM

## 2023-02-08 DIAGNOSIS — J44.9 COPD, SEVERE (H): Primary | ICD-10-CM

## 2023-02-09 RX ORDER — LISINOPRIL 2.5 MG/1
TABLET ORAL
Qty: 90 TABLET | Refills: 0 | Status: SHIPPED | OUTPATIENT
Start: 2023-02-09 | End: 2023-07-31

## 2023-02-09 NOTE — TELEPHONE ENCOUNTER
Prescription approved per [facility] Refill Protocol.    Zulay GOULD RN   [facility]

## 2023-02-16 ENCOUNTER — HOSPITAL ENCOUNTER (OUTPATIENT)
Dept: CARDIAC REHAB | Facility: CLINIC | Age: 78
Discharge: HOME | End: 2023-02-16
Attending: INTERNAL MEDICINE
Payer: COMMERCIAL

## 2023-02-16 DIAGNOSIS — J44.9 COPD, SEVERE (H): ICD-10-CM

## 2023-02-16 PROCEDURE — 94626 PHY/QHP OP PULM RHB W/MNTR: CPT

## 2023-02-17 DIAGNOSIS — Z13.6 CARDIOVASCULAR SCREENING; LDL GOAL LESS THAN 130: ICD-10-CM

## 2023-02-17 NOTE — TELEPHONE ENCOUNTER
Drainage Catheter Placement/ Change     DISCHARGE INSTRUCTIONS   You have had a ______ drainage catheter placed/changed today.   You may resume your normal diet.   You may resume your normal medications.   If you were given conscious sedation, someone should stay with you until morning.     Make sure the catheter does not kink and it is not pulled.   Empty bag as needed.   Record daily output volumes.   Keep a clean, dry dressing on the area at all times.   It is normal to experience some pain at the site over the next few days. You may take Tylenol for that pain.   Do not soak in a tub with the tube. Shower with the tube/bandage covered in plastic wrap.     Notify your primary physician and/or Interventional Radiologist IMMEDIATELY if any of the following occur:   · Fever of 101 degrees F or chills   · Swelling or redness, pain or discharge around the catheter   · Sudden decrease in drainage volume   · Lightheadedness or dizziness upon standing     Physician Contact Information   · If you have a problem that you believe requires immediate attention, you should go to the Emergency Room, either at [facility] or the closest hospital. If you believe that your problem can safely wait until you speak to a physician, you should contact your doctor or Interventional Radiologist.   · It is usually easier to contact your own physician by calling the office, or after hours through the answering service. If you do not know the number, the hospital  can connect you by calling [phone].   · If you have concerns that need to be answered by the Interventional Radiologist, you can reach him/ her as follows:   o During regular weekday hours (8AM to 5PM), call [phone] and ask the technologist to connect you with the Interventional Radiologist.   o During off hours for emergencies call [phone] and ask the hospital  to contact the Interventional Radiologist on-call.        Routing refill request to provider for review/approval because:  Labs not current:  LDL    Susan Bennett RN

## 2023-02-20 RX ORDER — ATORVASTATIN CALCIUM 10 MG/1
TABLET, FILM COATED ORAL
Qty: 90 TABLET | Refills: 0 | Status: SHIPPED | OUTPATIENT
Start: 2023-02-20 | End: 2023-07-31

## 2023-02-21 ENCOUNTER — LAB (OUTPATIENT)
Dept: LAB | Facility: CLINIC | Age: 78
End: 2023-02-21
Payer: COMMERCIAL

## 2023-02-21 DIAGNOSIS — Z12.5 SCREENING FOR PROSTATE CANCER: ICD-10-CM

## 2023-02-21 DIAGNOSIS — N18.1 TYPE 2 DIABETES MELLITUS WITH STAGE 1 CHRONIC KIDNEY DISEASE, WITH LONG-TERM CURRENT USE OF INSULIN (H): ICD-10-CM

## 2023-02-21 DIAGNOSIS — Z79.4 TYPE 2 DIABETES MELLITUS WITH STAGE 1 CHRONIC KIDNEY DISEASE, WITH LONG-TERM CURRENT USE OF INSULIN (H): ICD-10-CM

## 2023-02-21 DIAGNOSIS — E11.22 TYPE 2 DIABETES MELLITUS WITH STAGE 1 CHRONIC KIDNEY DISEASE, WITH LONG-TERM CURRENT USE OF INSULIN (H): ICD-10-CM

## 2023-02-21 DIAGNOSIS — Z13.6 CARDIOVASCULAR SCREENING; LDL GOAL LESS THAN 130: ICD-10-CM

## 2023-02-21 LAB
CHOLEST SERPL-MCNC: 104 MG/DL
HBA1C MFR BLD: 6.7 % (ref 0–5.6)
HDLC SERPL-MCNC: 43 MG/DL
LDLC SERPL CALC-MCNC: 47 MG/DL
NONHDLC SERPL-MCNC: 61 MG/DL
PSA SERPL-MCNC: 0.74 NG/ML (ref 0–6.5)
TRIGL SERPL-MCNC: 70 MG/DL

## 2023-02-21 PROCEDURE — G0103 PSA SCREENING: HCPCS

## 2023-02-21 PROCEDURE — 36415 COLL VENOUS BLD VENIPUNCTURE: CPT

## 2023-02-21 PROCEDURE — 83036 HEMOGLOBIN GLYCOSYLATED A1C: CPT

## 2023-02-21 PROCEDURE — 80061 LIPID PANEL: CPT

## 2023-03-07 ENCOUNTER — HOSPITAL ENCOUNTER (OUTPATIENT)
Dept: CARDIAC REHAB | Facility: CLINIC | Age: 78
Discharge: HOME | End: 2023-03-07
Attending: INTERNAL MEDICINE
Payer: COMMERCIAL

## 2023-03-07 PROCEDURE — 94625 PHY/QHP OP PULM RHB W/O MNTR: CPT

## 2023-03-09 ENCOUNTER — HOSPITAL ENCOUNTER (OUTPATIENT)
Dept: CARDIAC REHAB | Facility: CLINIC | Age: 78
Discharge: HOME | End: 2023-03-09
Attending: INTERNAL MEDICINE
Payer: COMMERCIAL

## 2023-03-09 PROCEDURE — 94625 PHY/QHP OP PULM RHB W/O MNTR: CPT

## 2023-03-14 ENCOUNTER — HOSPITAL ENCOUNTER (OUTPATIENT)
Dept: CARDIAC REHAB | Facility: CLINIC | Age: 78
Discharge: HOME | End: 2023-03-14
Attending: INTERNAL MEDICINE
Payer: COMMERCIAL

## 2023-03-14 ENCOUNTER — VIRTUAL VISIT (OUTPATIENT)
Dept: PEDIATRICS | Facility: CLINIC | Age: 78
End: 2023-03-14
Payer: COMMERCIAL

## 2023-03-14 DIAGNOSIS — Z79.4 TYPE 2 DIABETES MELLITUS WITH STAGE 1 CHRONIC KIDNEY DISEASE, WITH LONG-TERM CURRENT USE OF INSULIN (H): Primary | ICD-10-CM

## 2023-03-14 DIAGNOSIS — N18.1 TYPE 2 DIABETES MELLITUS WITH STAGE 1 CHRONIC KIDNEY DISEASE, WITH LONG-TERM CURRENT USE OF INSULIN (H): Primary | ICD-10-CM

## 2023-03-14 DIAGNOSIS — J44.9 STAGE 3 SEVERE COPD BY GOLD CLASSIFICATION (H): ICD-10-CM

## 2023-03-14 DIAGNOSIS — E11.22 TYPE 2 DIABETES MELLITUS WITH STAGE 1 CHRONIC KIDNEY DISEASE, WITH LONG-TERM CURRENT USE OF INSULIN (H): Primary | ICD-10-CM

## 2023-03-14 PROCEDURE — 94625 PHY/QHP OP PULM RHB W/O MNTR: CPT

## 2023-03-14 PROCEDURE — 99213 OFFICE O/P EST LOW 20 MIN: CPT | Performed by: INTERNAL MEDICINE

## 2023-03-14 NOTE — Clinical Note
Ordered home O2. Only needs with activity. There wasn't a place to order portable. Could you help with making sure he gets, and that he can get a portable? Yenni Sewell M.D.

## 2023-03-14 NOTE — Clinical Note
Please abstract the following data from this visit with this patient into the appropriate field in Epic:  Tests that can be patient reported without a hard copy:  Eye exam with ophthalmology on this date: 12/16/2022 Exam Location: [facility]  Other Tests found in the patient's chart through Chart Review/Care Everywhere:    Note to Abstraction: If this section is blank, no results were found via Chart Review/Care Everywhere.    Christi Ordoñez MA 4:23 PM 3/14/2023

## 2023-03-14 NOTE — PROGRESS NOTES
Theo is a 77 year old who is being evaluated via a billable telephone visit.      What phone number would you like to be contacted at? [phone]  How would you like to obtain your AVS? Scott     Distant Location (provider location):  On-site    Assessment & Plan     Type 2 diabetes mellitus with stage 1 chronic kidney disease, with long-term current use of insulin (H)  stable    Stage 3 severe COPD by GOLD classification (H)  Reviewed pulmonary rehab notes. O2 sats drop to 85% on 6 minute walk test and he felt short of breath. They recommended home O2 for activity and requested he follow up with me for order. Order written. He will continue medications as prescribed.   - Home Oxygen Order for DME - ONLY FOR DME      22 minutes spent on the date of the encounter doing chart review, history and exam, documentation and further activities per the note       See Patient Instructions    No follow-ups on file.    Yenni Sewell MD  [facility]    Subjective   Theo is a 77 year old, presenting for the following health issues:  No chief complaint on file.      History of Present Illness       COPD:  He presents for follow up of COPD.  Overall, COPD symptoms are slightly worse since last visit. He has more than usual fatigue or shortness of breath with exertion and no shortness of breath at rest.He sometimes coughs and does have change in sputum. No recent fever. He can walk less than a mile without stopping to rest. He can walk 2 flights of stairs without resting.The patient has had no ED, urgent care, or hospital admissions because of COPD since the last visit.     Diabetes:   He presents for follow up of diabetes.  He is checking home blood glucose one time daily. He checks blood glucose before and after meals.  Blood glucose is never over 200 and never under 70. When his blood glucose is low, the patient is asymptomatic for confusion, blurred vision, lethargy and reports not feeling dizzy,  shaky, or weak.  He is concerned about other.  He is having numbness in feet and excessive thirst. The patient has had a diabetic eye exam in the last 12 months. Eye exam performed on 12/16/2022. Location of last eye exam [facility].        He eats 2-3 servings of fruits and vegetables daily.He consumes 0 sweetened beverage(s) daily.He exercises with enough effort to increase his heart rate 20 to 29 minutes per day.  He exercises with enough effort to increase his heart rate 4 days per week.   He is taking medications regularly.       EYE EXAM DONE 12/16/2022 [facility] (message sent to abstract)    Went over and had appointment with Latisha Eckert. Was told spirometry looks worse. Sent him over to sent him to pulmonary lab. Will be doing 9 weeks of pulmonary rehab. Needs a rx for home oxygen.     2 l per minute desat to 85% for 6 minute walk test.        Review of Systems   Constitutional, HEENT, cardiovascular, pulmonary, gi and gu systems are negative, except as otherwise noted.      Objective           Vitals:  No vitals were obtained today due to virtual visit.    Physical Exam   healthy, alert and no distress  PSYCH: Alert and oriented times 3; coherent speech, normal   rate and volume, able to articulate logical thoughts, able   to abstract reason, no tangential thoughts, no hallucinations   or delusions  His affect is normal  RESP: No cough, no audible wheezing, able to talk in full sentences  Remainder of exam unable to be completed due to telephone visits    Reviewed spirometry and pulm rehab findings.      Phone call duration: 20 minutes

## 2023-03-16 ENCOUNTER — HOSPITAL ENCOUNTER (OUTPATIENT)
Dept: CARDIAC REHAB | Facility: CLINIC | Age: 78
Discharge: HOME | End: 2023-03-16
Attending: INTERNAL MEDICINE
Payer: COMMERCIAL

## 2023-03-16 PROCEDURE — 94625 PHY/QHP OP PULM RHB W/O MNTR: CPT

## 2023-03-17 ENCOUNTER — TELEPHONE (OUTPATIENT)
Dept: PEDIATRICS | Facility: CLINIC | Age: 78
End: 2023-03-17
Payer: COMMERCIAL

## 2023-03-17 NOTE — TELEPHONE ENCOUNTER
Yenni Sewell MD P Ea Sb3/5 Des OMALLEY (Rn)  Ordered home O2. Only needs with activity. There wasn't a place to order portable. Could you help with making sure he gets, and that he can get a portable? Yenni Sewell M.D.     Called [facility] in [place] ( [phone]) to inquire they got the order and to ensure that he can get a portable.     for call back.  Joyce Gaitan RN

## 2023-03-17 NOTE — TELEPHONE ENCOUNTER
I received a message from Mariposa with  Home [facility].  Asking if patient has oxygen at home already as this was ordered in the past.  She can be reached back at [phone]  Called patient to inquire-has not had oxygen in the past.  He has talked to Mariposa already. He will be getting it this afternoon.    Called Mariposa back-confirmed this will be delivered this afternoon.    Joyce Gaitan RN

## 2023-03-20 ENCOUNTER — TRANSFERRED RECORDS (OUTPATIENT)
Dept: HEALTH INFORMATION MANAGEMENT | Facility: CLINIC | Age: 78
End: 2023-03-20
Payer: COMMERCIAL

## 2023-03-20 LAB
ALT SERPL-CCNC: 22 IU/L (ref 5–40)
AST SERPL-CCNC: 21 U/L (ref 5–34)
CREATININE (EXTERNAL): 1.15 MG/DL (ref 0.5–1.3)

## 2023-03-21 ENCOUNTER — HOSPITAL ENCOUNTER (OUTPATIENT)
Dept: CARDIAC REHAB | Facility: CLINIC | Age: 78
Discharge: HOME | End: 2023-03-21
Attending: INTERNAL MEDICINE
Payer: COMMERCIAL

## 2023-03-21 PROCEDURE — 94625 PHY/QHP OP PULM RHB W/O MNTR: CPT

## 2023-03-23 ENCOUNTER — HOSPITAL ENCOUNTER (OUTPATIENT)
Dept: CARDIAC REHAB | Facility: CLINIC | Age: 78
Discharge: HOME | End: 2023-03-23
Attending: INTERNAL MEDICINE
Payer: COMMERCIAL

## 2023-03-23 PROCEDURE — 94625 PHY/QHP OP PULM RHB W/O MNTR: CPT

## 2023-03-28 ENCOUNTER — HOSPITAL ENCOUNTER (OUTPATIENT)
Dept: CARDIAC REHAB | Facility: CLINIC | Age: 78
Discharge: HOME | End: 2023-03-28
Attending: INTERNAL MEDICINE
Payer: COMMERCIAL

## 2023-03-28 PROCEDURE — 94625 PHY/QHP OP PULM RHB W/O MNTR: CPT

## 2023-06-19 ENCOUNTER — ANCILLARY ORDERS (OUTPATIENT)
Dept: CT IMAGING | Facility: CLINIC | Age: 78
End: 2023-06-19

## 2023-06-19 DIAGNOSIS — R91.1 SOLITARY LUNG NODULE: ICD-10-CM

## 2023-06-21 DIAGNOSIS — E11.22 TYPE 2 DIABETES MELLITUS WITH STAGE 2 CHRONIC KIDNEY DISEASE, WITH LONG-TERM CURRENT USE OF INSULIN (H): ICD-10-CM

## 2023-06-21 DIAGNOSIS — Z79.4 TYPE 2 DIABETES MELLITUS WITH STAGE 2 CHRONIC KIDNEY DISEASE, WITH LONG-TERM CURRENT USE OF INSULIN (H): ICD-10-CM

## 2023-06-21 DIAGNOSIS — N18.2 TYPE 2 DIABETES MELLITUS WITH STAGE 2 CHRONIC KIDNEY DISEASE, WITH LONG-TERM CURRENT USE OF INSULIN (H): ICD-10-CM

## 2023-06-23 RX ORDER — LANCETS
EACH MISCELLANEOUS
Qty: 100 EACH | Refills: 8 | Status: SHIPPED | OUTPATIENT
Start: 2023-06-23 | End: 2024-07-30

## 2023-06-23 NOTE — TELEPHONE ENCOUNTER
Prescription approved per [facility] Refill Protocol.  Elva Calvin RN, BSN  [facility]

## 2023-07-10 ENCOUNTER — TELEPHONE (OUTPATIENT)
Dept: PEDIATRICS | Facility: CLINIC | Age: 78
End: 2023-07-10
Payer: COMMERCIAL

## 2023-07-10 NOTE — TELEPHONE ENCOUNTER
Patient Quality Outreach Health Maintenance - PAL RN    Summary:    PAL RN contacted pt regarding overdue health maintenance    Patient is due/failing the following:   Diabetes -  Foot Exam  Physical Annual Wellness Visit      Topic Date Due     Diptheria Tetanus Pertussis (DTAP/TDAP/TD) Vaccine (1 - Tdap) 04/09/2013       Health Maintenance Due   Topic Date Due     DTAP/TDAP/TD IMMUNIZATION (1 - Tdap) 04/09/2013     BMP  07/20/2023     MEDICARE ANNUAL WELLNESS VISIT  07/27/2023     MICROALBUMIN  07/27/2023     DIABETIC FOOT EXAM  07/27/2023     ANNUAL REVIEW OF HM ORDERS  07/27/2023     FALL RISK ASSESSMENT  07/27/2023       Type of outreach:    Chart review performed, no outreach needed.  Patient is scheduled on 7/31/23.    - Advised patient if any questions or concerns comes up to call the PAL RN.   - Patient given opportunity to ask questions and at this time there is nothing further needed.     Questions for provider review:    None                                                                                     Chart routed to none.    Joyce Gaitan, RN

## 2023-07-28 SDOH — ECONOMIC STABILITY: FOOD INSECURITY: WITHIN THE PAST 12 MONTHS, THE FOOD YOU BOUGHT JUST DIDN'T LAST AND YOU DIDN'T HAVE MONEY TO GET MORE.: NEVER TRUE

## 2023-07-28 SDOH — ECONOMIC STABILITY: FOOD INSECURITY: WITHIN THE PAST 12 MONTHS, YOU WORRIED THAT YOUR FOOD WOULD RUN OUT BEFORE YOU GOT MONEY TO BUY MORE.: NEVER TRUE

## 2023-07-28 SDOH — ECONOMIC STABILITY: INCOME INSECURITY: IN THE LAST 12 MONTHS, WAS THERE A TIME WHEN YOU WERE NOT ABLE TO PAY THE MORTGAGE OR RENT ON TIME?: NO

## 2023-07-28 SDOH — ECONOMIC STABILITY: INCOME INSECURITY: HOW HARD IS IT FOR YOU TO PAY FOR THE VERY BASICS LIKE FOOD, HOUSING, MEDICAL CARE, AND HEATING?: NOT HARD AT ALL

## 2023-07-28 SDOH — HEALTH STABILITY: PHYSICAL HEALTH: ON AVERAGE, HOW MANY MINUTES DO YOU ENGAGE IN EXERCISE AT THIS LEVEL?: 30 MIN

## 2023-07-28 SDOH — HEALTH STABILITY: PHYSICAL HEALTH: ON AVERAGE, HOW MANY DAYS PER WEEK DO YOU ENGAGE IN MODERATE TO STRENUOUS EXERCISE (LIKE A BRISK WALK)?: 2 DAYS

## 2023-07-28 ASSESSMENT — LIFESTYLE VARIABLES
SKIP TO QUESTIONS 9-10: 1
HOW MANY STANDARD DRINKS CONTAINING ALCOHOL DO YOU HAVE ON A TYPICAL DAY: PATIENT DOES NOT DRINK
AUDIT-C TOTAL SCORE: 0
HOW OFTEN DO YOU HAVE SIX OR MORE DRINKS ON ONE OCCASION: NEVER
HOW OFTEN DO YOU HAVE A DRINK CONTAINING ALCOHOL: NEVER

## 2023-07-28 ASSESSMENT — ENCOUNTER SYMPTOMS
SHORTNESS OF BREATH: 1
ARTHRALGIAS: 0
DIZZINESS: 0
HEMATOCHEZIA: 0
JOINT SWELLING: 0
FEVER: 0
NAUSEA: 0
WEAKNESS: 0
DYSURIA: 0
NERVOUS/ANXIOUS: 0
DIARRHEA: 0
HEMATURIA: 0
PALPITATIONS: 0
COUGH: 0
CHILLS: 0
PARESTHESIAS: 0
FREQUENCY: 0
SORE THROAT: 1
EYE PAIN: 0
CONSTIPATION: 0
ABDOMINAL PAIN: 0
HEARTBURN: 0
MYALGIAS: 1
HEADACHES: 0

## 2023-07-28 ASSESSMENT — SOCIAL DETERMINANTS OF HEALTH (SDOH)
HOW OFTEN DO YOU GET TOGETHER WITH FRIENDS OR RELATIVES?: TWICE A WEEK
ARE YOU MARRIED, WIDOWED, DIVORCED, SEPARATED, NEVER MARRIED, OR LIVING WITH A PARTNER?: NEVER MARRIED
IN A TYPICAL WEEK, HOW MANY TIMES DO YOU TALK ON THE PHONE WITH FAMILY, FRIENDS, OR NEIGHBORS?: NEVER
DO YOU BELONG TO ANY CLUBS OR ORGANIZATIONS SUCH AS CHURCH GROUPS UNIONS, FRATERNAL OR ATHLETIC GROUPS, OR SCHOOL GROUPS?: YES
HOW OFTEN DO YOU ATTEND CHURCH OR RELIGIOUS SERVICES?: 1 TO 4 TIMES PER YEAR

## 2023-07-28 ASSESSMENT — ACTIVITIES OF DAILY LIVING (ADL): CURRENT_FUNCTION: NO ASSISTANCE NEEDED

## 2023-07-31 ENCOUNTER — OFFICE VISIT (OUTPATIENT)
Dept: PEDIATRICS | Facility: CLINIC | Age: 78
End: 2023-07-31
Payer: COMMERCIAL

## 2023-07-31 VITALS
BODY MASS INDEX: 24.66 KG/M2 | WEIGHT: 198.31 LBS | TEMPERATURE: 97.7 F | OXYGEN SATURATION: 98 % | DIASTOLIC BLOOD PRESSURE: 66 MMHG | SYSTOLIC BLOOD PRESSURE: 111 MMHG | HEIGHT: 75 IN | HEART RATE: 66 BPM | RESPIRATION RATE: 20 BRPM

## 2023-07-31 DIAGNOSIS — R12 HEARTBURN: ICD-10-CM

## 2023-07-31 DIAGNOSIS — C34.91 CARCINOMA OF RIGHT LUNG (H): ICD-10-CM

## 2023-07-31 DIAGNOSIS — Z79.4 TYPE 2 DIABETES MELLITUS WITH STAGE 1 CHRONIC KIDNEY DISEASE, WITH LONG-TERM CURRENT USE OF INSULIN (H): ICD-10-CM

## 2023-07-31 DIAGNOSIS — K68.2 RETROPERITONEAL FIBROSIS: ICD-10-CM

## 2023-07-31 DIAGNOSIS — J44.9 STAGE 3 SEVERE COPD BY GOLD CLASSIFICATION (H): ICD-10-CM

## 2023-07-31 DIAGNOSIS — Z00.00 ENCOUNTER FOR MEDICARE ANNUAL WELLNESS EXAM: Primary | ICD-10-CM

## 2023-07-31 DIAGNOSIS — N18.1 TYPE 2 DIABETES MELLITUS WITH STAGE 1 CHRONIC KIDNEY DISEASE, WITH LONG-TERM CURRENT USE OF INSULIN (H): ICD-10-CM

## 2023-07-31 DIAGNOSIS — R06.83 SNORING: ICD-10-CM

## 2023-07-31 DIAGNOSIS — E11.22 TYPE 2 DIABETES MELLITUS WITH STAGE 1 CHRONIC KIDNEY DISEASE, WITH LONG-TERM CURRENT USE OF INSULIN (H): ICD-10-CM

## 2023-07-31 DIAGNOSIS — Z13.6 CARDIOVASCULAR SCREENING; LDL GOAL LESS THAN 130: ICD-10-CM

## 2023-07-31 LAB
ANION GAP SERPL CALCULATED.3IONS-SCNC: 11 MMOL/L (ref 7–15)
BUN SERPL-MCNC: 22.9 MG/DL (ref 8–23)
CALCIUM SERPL-MCNC: 9.9 MG/DL (ref 8.8–10.2)
CHLORIDE SERPL-SCNC: 103 MMOL/L (ref 98–107)
CREAT SERPL-MCNC: 1.07 MG/DL (ref 0.67–1.17)
DEPRECATED HCO3 PLAS-SCNC: 24 MMOL/L (ref 22–29)
GFR SERPL CREATININE-BSD FRML MDRD: 71 ML/MIN/1.73M2
GLUCOSE SERPL-MCNC: 103 MG/DL (ref 70–99)
HBA1C MFR BLD: 6.8 % (ref 0–5.6)
POTASSIUM SERPL-SCNC: 4.3 MMOL/L (ref 3.4–5.3)
SODIUM SERPL-SCNC: 138 MMOL/L (ref 136–145)

## 2023-07-31 PROCEDURE — 80048 BASIC METABOLIC PNL TOTAL CA: CPT | Performed by: INTERNAL MEDICINE

## 2023-07-31 PROCEDURE — 99214 OFFICE O/P EST MOD 30 MIN: CPT | Mod: 25 | Performed by: INTERNAL MEDICINE

## 2023-07-31 PROCEDURE — 82043 UR ALBUMIN QUANTITATIVE: CPT | Performed by: INTERNAL MEDICINE

## 2023-07-31 PROCEDURE — 82570 ASSAY OF URINE CREATININE: CPT | Performed by: INTERNAL MEDICINE

## 2023-07-31 PROCEDURE — 83036 HEMOGLOBIN GLYCOSYLATED A1C: CPT | Performed by: INTERNAL MEDICINE

## 2023-07-31 PROCEDURE — 36415 COLL VENOUS BLD VENIPUNCTURE: CPT | Performed by: INTERNAL MEDICINE

## 2023-07-31 PROCEDURE — G0439 PPPS, SUBSEQ VISIT: HCPCS | Performed by: INTERNAL MEDICINE

## 2023-07-31 RX ORDER — LEVOFLOXACIN 500 MG/1
TABLET, FILM COATED ORAL
COMMUNITY
End: 2024-01-29

## 2023-07-31 RX ORDER — METFORMIN HCL 500 MG
1500 TABLET, EXTENDED RELEASE 24 HR ORAL
Qty: 270 TABLET | Refills: 11 | Status: SHIPPED | OUTPATIENT
Start: 2023-07-31 | End: 2024-07-30

## 2023-07-31 RX ORDER — BNT162B2 ORIGINAL AND OMICRON BA.4/BA.5 .1125; .1125 MG/2.25ML; MG/2.25ML
INJECTION, SUSPENSION INTRAMUSCULAR
COMMUNITY
Start: 2022-09-28 | End: 2024-07-30

## 2023-07-31 RX ORDER — LISINOPRIL 2.5 MG/1
2.5 TABLET ORAL DAILY
Qty: 90 TABLET | Refills: 3 | Status: SHIPPED | OUTPATIENT
Start: 2023-07-31 | End: 2024-07-30

## 2023-07-31 RX ORDER — FAMOTIDINE 20 MG/1
20 TABLET, FILM COATED ORAL 2 TIMES DAILY
Qty: 180 TABLET | Refills: 4 | Status: SHIPPED | OUTPATIENT
Start: 2023-07-31 | End: 2024-07-30

## 2023-07-31 RX ORDER — ALBUTEROL SULFATE 90 UG/1
2 AEROSOL, METERED RESPIRATORY (INHALATION) EVERY 4 HOURS PRN
Qty: 18 G | Refills: 4 | Status: SHIPPED | OUTPATIENT
Start: 2023-07-31

## 2023-07-31 RX ORDER — INFLUENZA A VIRUS A/MICHIGAN/45/2015 X-275 (H1N1) ANTIGEN (FORMALDEHYDE INACTIVATED), INFLUENZA A VIRUS A/SINGAPORE/INFIMH-16-0019/2016 IVR-186 (H3N2) ANTIGEN (FORMALDEHYDE INACTIVATED), INFLUENZA B VIRUS B/PHUKET/3073/2013 ANTIGEN (FORMALDEHYDE INACTIVATED), AND INFLUENZA B VIRUS B/MARYLAND/15/2016 BX-69A ANTIGEN (FORMALDEHYDE INACTIVATED) 60; 60; 60; 60 UG/.7ML; UG/.7ML; UG/.7ML; UG/.7ML
INJECTION, SUSPENSION INTRAMUSCULAR
COMMUNITY
Start: 2022-09-28 | End: 2024-07-30

## 2023-07-31 RX ORDER — ATORVASTATIN CALCIUM 10 MG/1
10 TABLET, FILM COATED ORAL DAILY
Qty: 90 TABLET | Refills: 3 | Status: SHIPPED | OUTPATIENT
Start: 2023-07-31 | End: 2024-07-30

## 2023-07-31 ASSESSMENT — ENCOUNTER SYMPTOMS
FREQUENCY: 0
NERVOUS/ANXIOUS: 0
ARTHRALGIAS: 0
SHORTNESS OF BREATH: 1
COUGH: 0
CONSTIPATION: 0
EYE PAIN: 0
NAUSEA: 0
ABDOMINAL PAIN: 0
MYALGIAS: 1
CHILLS: 0
HEMATOCHEZIA: 0
PALPITATIONS: 0
WEAKNESS: 0
HEMATURIA: 0
PARESTHESIAS: 0
FEVER: 0
JOINT SWELLING: 0
HEARTBURN: 0
DYSURIA: 0
DIARRHEA: 0
DIZZINESS: 0
HEADACHES: 0
SORE THROAT: 1

## 2023-07-31 ASSESSMENT — PAIN SCALES - GENERAL: PAINLEVEL: NO PAIN (0)

## 2023-07-31 ASSESSMENT — ACTIVITIES OF DAILY LIVING (ADL): CURRENT_FUNCTION: NO ASSISTANCE NEEDED

## 2023-07-31 NOTE — PATIENT INSTRUCTIONS
Ask your pulmonologist about a sleep study, severe snoring.  Use your oxygen more during the day with exertion.     Airnow.gov    Tetanus and pneumonia booster at pharmacy  Patient Education   Personalized Prevention Plan  You are due for the preventive services outlined below.  Your care team is available to assist you in scheduling these services.  If you have already completed any of these items, please share that information with your care team to update in your medical record.  Health Maintenance Due   Topic Date Due     Diptheria Tetanus Pertussis (DTAP/TDAP/TD) Vaccine (1 - Tdap) 04/09/2013     Basic Metabolic Panel  07/20/2023     Kidney Microalbumin Urine Test  07/27/2023     Diabetic Foot Exam  07/27/2023     Your Health Risk Assessment indicates you feel you are not in good health    A healthy lifestyle helps keep the body fit and the mind alert. It helps protect you from disease, helps you fight disease, and helps prevent chronic disease (disease that doesn't go away) from getting worse. This is important as you get older and begin to notice twinges in muscles and joints and a decline in the strength and stamina you once took for granted. A healthy lifestyle includes good healthcare, good nutrition, weight control, recreation, and regular exercise. Avoid harmful substances and do what you can to keep safe. Another part of a healthy lifestyle is stay mentally active and socially involved.    Good healthcare   Have a wellness visit every year.   If you have new symptoms, let us know right away. Don't wait until the next checkup.   Take medicines exactly as prescribed and keep your medicines in a safe place. Tell us if your medicine causes problems.   Healthy diet and weight control   Eat 3 or 4 small, nutritious, low-fat, high-fiber meals a day. Include a variety of fruits, vegetables, and whole-grain foods.   Make sure you get enough calcium in your diet. Calcium, vitamin D, and exercise help prevent  osteoporosis (bone thinning).   If you live alone, try eating with others when you can. That way you get a good meal and have company while you eat it.   Try to keep a healthy weight. If you eat more calories than your body uses for energy, it will be stored as fat and you will gain weight.     Recreation   Recreation is not limited to sports and team events. It includes any activity that provides relaxation, interest, enjoyment, and exercise. Recreation provides an outlet for physical, mental, and social energy. It can give a sense of worth and achievement. It can help you stay healthy.    Mental Exercise and Social Involvement  Mental and emotional health is as important as physical health. Keep in touch with friends and family. Stay as active as possible. Continue to learn and challenge yourself.   Things you can do to stay mentally active are:  Learn something new, like a foreign language or musical instrument.   Play SCRABBLE or do crossword puzzles. If you cannot find people to play these games with you at home, you can play them with others on your computer through the Internet.   Join a games club--anything from card games to chess or checkers or lawn bowling.   Start a new hobby.   Go back to school.   Volunteer.   Read.   Keep up with world events.  Hearing Loss: Care Instructions  Overview     Hearing loss is a sudden or slow decrease in how well you hear. It can range from slight to profound. Permanent hearing loss can occur with aging. It also can happen when you are exposed long-term to loud noise. Examples include listening to loud music, riding motorcycles, or being around other loud machines.  Hearing loss can affect your work and home life. It can make you feel lonely or depressed. You may feel that you have lost your independence. But hearing aids and other devices can help you hear better and feel connected to others.  Follow-up care is a key part of your treatment and safety. Be sure to make and  go to all appointments, and call your doctor if you are having problems. It's also a good idea to know your test results and keep a list of the medicines you take.  How can you care for yourself at home?  Avoid loud noises whenever possible. This helps keep your hearing from getting worse.  Always wear hearing protection around loud noises.  Wear a hearing aid as directed.  A professional can help you pick a hearing aid that will work best for you.  You can also get hearing aids over the counter for mild to moderate hearing loss.  Have hearing tests as your doctor suggests. They can show whether your hearing has changed. Your hearing aid may need to be adjusted.  Use other devices as needed. These may include:  Telephone amplifiers and hearing aids that can connect to a television, stereo, radio, or microphone.  Devices that use lights or vibrations. These alert you to the doorbell, a ringing telephone, or a baby monitor.  Television closed-captioning. This shows the words at the bottom of the screen. Most new TVs can do this.  TTY (text telephone). This lets you type messages back and forth on the telephone instead of talking or listening. These devices are also called TDD. When messages are typed on the keyboard, they are sent over the phone line to a receiving TTY. The message is shown on a monitor.  Use text messaging, social media, and email if it is hard for you to communicate by telephone.  Try to learn a listening technique called speechreading. It is not lipreading. You pay attention to people's gestures, expressions, posture, and tone of voice. These clues can help you understand what a person is saying. Face the person you are talking to, and have them face you. Make sure the lighting is good. You need to see the other person's face clearly.  Think about counseling if you need help to adjust to your hearing loss.  When should you call for help?  Watch closely for changes in your health, and be sure to  "contact your doctor if:    You think your hearing is getting worse.     You have new symptoms, such as dizziness or nausea.   Where can you learn more?  Go to https://www.[employer].net/patiented  Enter R798 in the search box to learn more about \"Hearing Loss: Care Instructions.\"  Current as of: March 1, 2023               Content Version: 13.7    [id number] [facility].   Care instructions adapted under license by your healthcare professional. If you have questions about a medical condition or this instruction, always ask your healthcare professional. [facility] disclaims any warranty or liability for your use of this information.         "

## 2023-07-31 NOTE — PROGRESS NOTES
"SUBJECTIVE:   Theo is a 77 year old who presents for Preventive Visit.      7/31/2023    10:13 AM   Additional Questions   Roomed by Argelia Mcgill CMA   Accompanied by N/A         7/31/2023    10:13 AM   Patient Reported Additional Medications   Patient reports taking the following new medications N/A       Are you in the first 12 months of your Medicare coverage?  No    Healthy Habits:     In general, how would you rate your overall health?  Fair    Frequency of exercise:  2-3 days/week    Duration of exercise:  30-45 minutes    Do you usually eat at least 4 servings of fruit and vegetables a day, include whole grains    & fiber and avoid regularly eating high fat or \"junk\" foods?  Yes    Taking medications regularly:  Yes    Medication side effects:  None    Ability to successfully perform activities of daily living:  No assistance needed    Home Safety:  Lack of grab bars in the bathroom    Hearing Impairment:  Difficulty following a conversation in a noisy restaurant or crowded room and need to ask people to speak up or repeat themselves    In the past 6 months, have you been bothered by leaking of urine?  No    In general, how would you rate your overall mental or emotional health?  Excellent    Additional concerns today:  Yes      Have you ever done Advance Care Planning? (For example, a Health Directive, POLST, or a discussion with a medical provider or your loved ones about your wishes): Yes, advance care planning is on file.    Fall risk  Fallen 2 or more times in the past year?: No  Any fall with injury in the past year?: No    Cognitive Screening   1) Repeat 3 items (Leader, Season, Table)    2) Clock draw: NORMAL  3) 3 item recall: Recalls 3 objects  Results: 3 items recalled: COGNITIVE IMPAIRMENT LESS LIKELY    Mini-CogTM Copyright MATILDA Farah. Licensed by the author for use in [facility]; reprinted with permission (lissette@.[facility]). All rights reserved.      Do you have sleep apnea, excessive " snoring or daytime drowsiness? : no    Reviewed and updated as needed this visit by clinical staff   Tobacco  Allergies  Meds              Reviewed and updated as needed this visit by Provider                 Social History     Tobacco Use     Smoking status: Former     Packs/day: 0.50     Years: 47.00     Pack years: 23.50     Types: Cigarettes, Cigars, Pipe     Start date: 3/1/1966     Quit date: 10/1/2013     Years since quittin.8     Smokeless tobacco: Never     Tobacco comments:     occ   Substance Use Topics     Alcohol use: Yes     Comment: None since 2023    11:41 AM   Alcohol Use   Prescreen: >3 drinks/day or >7 drinks/week? No     Do you have a current opioid prescription? No  Do you use any other controlled substances or medications that are not prescribed by a provider? None        Diabetes Follow-up    How often are you checking your blood sugar? One time daily  What time of day are you checking your blood sugars (select all that apply)?  Before meals  Have you had any blood sugars above 200?  No  Have you had any blood sugars below 70?  No  What concerns do you have today about your diabetes? None   Do you have any of these symptoms? (Select all that apply)  No numbness or tingling in feet.  No redness, sores or blisters on feet.  No complaints of excessive thirst.  No reports of blurry vision.  No significant changes to weight.      BP Readings from Last 2 Encounters:   23 111/66   22 120/66     Hemoglobin A1C (%)   Date Value   2023 6.7 (H)   2022 6.2 (H)   2021 6.0 (H)   2020 5.7 (H)     LDL Cholesterol Calculated (mg/dL)   Date Value   2023 47   2021 51   2020 44   2019 54             Hyperlipidemia Follow-Up    Are you regularly taking any medication or supplement to lower your cholesterol?   Yes- lipitor  Are you having muscle aches or other side effects that you think could be caused by your cholesterol  lowering medication?  No    Hypertension Follow-up    Do you check your blood pressure regularly outside of the clinic? Yes   Are you following a low salt diet? Yes  Are your blood pressures ever more than 140 on the top number (systolic) OR more   than 90 on the bottom number (diastolic), for example 140/90? No    COPD Follow-Up  Overall, how are your COPD symptoms since your last clinic visit?  No change  How much fatigue or shortness of breath do you have when you are walking?  Same as usual  How much shortness of breath do you have when you are resting?  None  How often do you cough? Never  Have you noticed any change in your sputum/phlegm?  No  Have you experienced a recent fever? No  Please describe how far you can walk without stopping to rest:  doesn't notice a limit when he is out for a walk with his oxygen. Notes he was a little short of breath coming in to clinic today, used elevator. Was short of breath by the time he got to the desk to check in.  How many flights of stairs are you able to walk up without stopping?  1  Have you had any Emergency Room Visits, Urgent Care Visits, or Hospital Admissions because of your COPD since your last office visit?  No    History   Smoking Status     Former     Packs/day: 0.50     Years: 47.00     Types: Cigarettes, Cigars, Pipe     Start date: 3/1/1966     Quit date: 10/1/2013   Smokeless Tobacco     Never     No results found for: FEV1, MLY0XMP    Current providers sharing in care for this patient include:   Patient Care Team:  Yenni Sewell MD as PCP - General  Yenni Sewell MD as Assigned PCP  Elva Nguyễn RD as Diabetes Educator (Dietitian, Registered)  Jose D Campoverde MD as MD (Internal Medicine)  Jerome Rasheed MD as MD (Urology)  Maci Reynolds as Pulmonologist (Physician Assistant)  Abby Guerra (Dermatology)  David Valenzuela Katarzyna, RN as Personal Advocate & Liaison (PAL)    The following health  maintenance items are reviewed in Epic and correct as of today:  Health Maintenance   Topic Date Due     DTAP/TDAP/TD IMMUNIZATION (1 - Tdap) 04/09/2013     BMP  07/20/2023     MICROALBUMIN  07/27/2023     DIABETIC FOOT EXAM  07/27/2023     ANNUAL REVIEW OF HM ORDERS  07/27/2023     MEDICARE ANNUAL WELLNESS VISIT  07/27/2023     A1C  08/21/2023     INFLUENZA VACCINE (1) 09/01/2023     COLORECTAL CANCER SCREENING  11/23/2023     EYE EXAM  12/16/2023     CT CHEST W/O CONTRAST  02/06/2024     LIPID  02/21/2024     PSA  02/21/2024     FALL RISK ASSESSMENT  07/31/2024     ADVANCE CARE PLANNING  07/31/2028     SPIROMETRY  Completed     HEPATITIS C SCREENING  Completed     COPD ACTION PLAN  Completed     PHQ-2 (once per calendar year)  Completed     Pneumococcal Vaccine: 65+ Years  Completed     URINALYSIS  Completed     ZOSTER IMMUNIZATION  Completed     COVID-19 Vaccine  Completed     IPV IMMUNIZATION  Aged Out     MENINGITIS IMMUNIZATION  Aged Out     LUNG CANCER SCREENING  Discontinued     Labs reviewed in EPIC        Review of Systems   Constitutional:  Negative for chills and fever.   HENT:  Positive for sore throat. Negative for congestion, ear pain and hearing loss.    Eyes:  Negative for pain and visual disturbance.   Respiratory:  Positive for shortness of breath. Negative for cough.    Cardiovascular:  Negative for chest pain, palpitations and peripheral edema.   Gastrointestinal:  Negative for abdominal pain, constipation, diarrhea, heartburn, hematochezia and nausea.   Genitourinary:  Negative for dysuria, frequency, genital sores, hematuria, impotence, penile discharge and urgency.   Musculoskeletal:  Positive for myalgias. Negative for arthralgias and joint swelling.   Skin:  Negative for rash.   Neurological:  Negative for dizziness, weakness, headaches and paresthesias.   Psychiatric/Behavioral:  Negative for mood changes. The patient is not nervous/anxious.        OBJECTIVE:   /66 (BP Location:  "Right arm, Patient Position: Sitting, Cuff Size: Adult Large)   Pulse 66   Temp 97.7  F (36.5  C) (Oral)   Resp 20   Ht 1.905 m (6' 3\")   Wt 90 kg (198 lb 5 oz)   SpO2 98%   BMI 24.79 kg/m   Estimated body mass index is 24.79 kg/m  as calculated from the following:    Height as of this encounter: 1.905 m (6' 3\").    Weight as of this encounter: 90 kg (198 lb 5 oz).  Physical Exam  GENERAL: healthy, alert and no distress  EYES: Eyes grossly normal to inspection, PERRL and conjunctivae and sclerae normal  NECK: no adenopathy, no asymmetry, masses, or scars and thyroid normal to palpation  RESP: lungs clear to auscultation - no rales, rhonchi or wheezes  CV: regular rate and rhythm, normal S1 S2, no S3 or S4, no murmur, click or rub, no peripheral edema and peripheral pulses strong  ABDOMEN: soft, nontender, no hepatosplenomegaly, no masses and bowel sounds normal  MS: no gross musculoskeletal defects noted, no edema  SKIN: no suspicious lesions or rashes  NEURO: Normal strength and tone, mentation intact and speech normal  PSYCH: mentation appears normal, affect normal/bright    Diagnostic Test Results:  Labs reviewed in Epic    ASSESSMENT / PLAN:     1. Encounter for Medicare annual wellness exam      2. Stage 3 severe COPD by GOLD classification (H)  Worsened after treatment for lung cancer. Stable since then. Using oxygen with activity at times, but not when golfing or doing short activities like getting mail. Sometimes just doesn't want to bring the tank.  Has upcoming appt with pulmonary. Does not appear to be in flare, no change in symptoms or sputum. Did 4 weeks of pulm rehab 3 months ago. Defer further evaluation and treatment to pulmonary. No chest pain or palpitations, no orthopnea or PND to suggest underlying cardiac disease.   - Fluticasone-Umeclidin-Vilant (TRELEGY ELLIPTA) 100-62.5-25 MCG/ACT oral inhaler; Inhale 1 puff into the lungs daily  Dispense: 90 each; Refill: 3  - albuterol (PROAIR " HFA/PROVENTIL HFA/VENTOLIN HFA) 108 (90 Base) MCG/ACT inhaler; Inhale 2 puffs into the lungs every 4 hours as needed for shortness of breath, wheezing or cough  Dispense: 18 g; Refill: 4    3. Snoring  Recommended he discuss with his pulmonologist    4. Carcinoma of right lung (H)  Following up with oncology    5. Type 2 diabetes mellitus with stage 1 chronic kidney disease, with long-term current use of insulin (H)  Due for A1C. Otherwise doing well with just metformin.   - BASIC METABOLIC PANEL; Future  - Albumin Random Urine Quantitative with Creat Ratio; Future  - Hemoglobin A1c; Future  - metFORMIN (GLUCOPHAGE XR) 500 MG 24 hr tablet; Take 3 tablets (1,500 mg) by mouth daily (with dinner)  Dispense: 270 tablet; Refill: 11  - BASIC METABOLIC PANEL  - Albumin Random Urine Quantitative with Creat Ratio  - Hemoglobin A1c    6. Heartburn  Reasonable control  - famotidine (PEPCID) 20 MG tablet; Take 1 tablet (20 mg) by mouth 2 times daily  Dispense: 180 tablet; Refill: 4    7. CARDIOVASCULAR SCREENING; LDL GOAL LESS THAN 130    - atorvastatin (LIPITOR) 10 MG tablet; Take 1 tablet (10 mg) by mouth daily  Dispense: 90 tablet; Refill: 3  - lisinopril (ZESTRIL) 2.5 MG tablet; Take 1 tablet (2.5 mg) by mouth daily  Dispense: 90 tablet; Refill: 3    8. Retroperitoneal fibrosis  Continues to follow with rheumatology. Has been getting yearly PSA and renal US from urology, but hasn't needed anything else. Discussed option to discontinue urology follow up and do US yearly with PSA.    COUNSELING:  Reviewed preventive health counseling, as reflected in patient instructions      He reports that he quit smoking about 9 years ago. His smoking use included cigarettes, cigars, and pipe. He started smoking about 57 years ago. He has a 23.50 pack-year smoking history. He has never used smokeless tobacco.      Appropriate preventive services were discussed with this patient, including applicable screening as appropriate for  cardiovascular disease, diabetes, osteopenia/osteoporosis, and glaucoma.  As appropriate for age/gender, discussed screening for colorectal cancer, prostate cancer, breast cancer, and cervical cancer. Checklist reviewing preventive services available has been given to the patient.    Reviewed patients plan of care and provided an AVS. The Intermediate Care Plan ( asthma action plan, low back pain action plan, and migraine action plan) for Lauri meets the Care Plan requirement. This Care Plan has been established and reviewed with the Patient.          Yenni Sewell MD  [facility]    Identified Health Risks:  I have reviewed Opioid Use Disorder and Substance Use Disorder risk factors and made any needed referrals. The patient was provided with suggestions to help him develop a healthy physical lifestyle.  The patient was provided with written information regarding signs of hearing loss.

## 2023-08-01 LAB
CREAT UR-MCNC: 58.4 MG/DL
MICROALBUMIN UR-MCNC: <12 MG/L
MICROALBUMIN/CREAT UR: NORMAL MG/G{CREAT}

## 2023-08-09 ENCOUNTER — ANCILLARY PROCEDURE (OUTPATIENT)
Dept: CT IMAGING | Facility: CLINIC | Age: 78
End: 2023-08-09
Attending: THORACIC SURGERY (CARDIOTHORACIC VASCULAR SURGERY)
Payer: COMMERCIAL

## 2023-08-09 DIAGNOSIS — R91.1 SOLITARY LUNG NODULE: ICD-10-CM

## 2023-08-09 LAB — RADIOLOGIST FLAGS: ABNORMAL

## 2023-08-09 PROCEDURE — 71250 CT THORAX DX C-: CPT

## 2023-08-11 ENCOUNTER — ANCILLARY ORDERS (OUTPATIENT)
Dept: PET IMAGING | Facility: CLINIC | Age: 78
End: 2023-08-11

## 2023-08-11 DIAGNOSIS — R91.1 SOLITARY LUNG NODULE: Primary | ICD-10-CM

## 2023-08-15 ENCOUNTER — TRANSFERRED RECORDS (OUTPATIENT)
Dept: HEALTH INFORMATION MANAGEMENT | Facility: CLINIC | Age: 78
End: 2023-08-15
Payer: COMMERCIAL

## 2023-08-22 ENCOUNTER — TRANSFERRED RECORDS (OUTPATIENT)
Dept: PEDIATRICS | Facility: CLINIC | Age: 78
End: 2023-08-22

## 2023-08-30 ENCOUNTER — TRANSFERRED RECORDS (OUTPATIENT)
Dept: HEALTH INFORMATION MANAGEMENT | Facility: CLINIC | Age: 78
End: 2023-08-30

## 2023-08-30 DIAGNOSIS — Z79.4 TYPE 2 DIABETES MELLITUS WITH STAGE 2 CHRONIC KIDNEY DISEASE, WITH LONG-TERM CURRENT USE OF INSULIN (H): ICD-10-CM

## 2023-08-30 DIAGNOSIS — N18.2 TYPE 2 DIABETES MELLITUS WITH STAGE 2 CHRONIC KIDNEY DISEASE, WITH LONG-TERM CURRENT USE OF INSULIN (H): ICD-10-CM

## 2023-08-30 DIAGNOSIS — E11.22 TYPE 2 DIABETES MELLITUS WITH STAGE 2 CHRONIC KIDNEY DISEASE, WITH LONG-TERM CURRENT USE OF INSULIN (H): ICD-10-CM

## 2023-08-31 ENCOUNTER — TRANSFERRED RECORDS (OUTPATIENT)
Dept: HEALTH INFORMATION MANAGEMENT | Facility: CLINIC | Age: 78
End: 2023-08-31
Payer: COMMERCIAL

## 2023-09-04 ENCOUNTER — TRANSFERRED RECORDS (OUTPATIENT)
Dept: HEALTH INFORMATION MANAGEMENT | Facility: CLINIC | Age: 78
End: 2023-09-04
Payer: COMMERCIAL

## 2023-09-06 DIAGNOSIS — N18.2 TYPE 2 DIABETES MELLITUS WITH STAGE 2 CHRONIC KIDNEY DISEASE, WITH LONG-TERM CURRENT USE OF INSULIN (H): Primary | ICD-10-CM

## 2023-09-06 DIAGNOSIS — Z79.4 TYPE 2 DIABETES MELLITUS WITH STAGE 2 CHRONIC KIDNEY DISEASE, WITH LONG-TERM CURRENT USE OF INSULIN (H): Primary | ICD-10-CM

## 2023-09-06 DIAGNOSIS — E11.22 TYPE 2 DIABETES MELLITUS WITH STAGE 2 CHRONIC KIDNEY DISEASE, WITH LONG-TERM CURRENT USE OF INSULIN (H): Primary | ICD-10-CM

## 2023-09-19 ENCOUNTER — TRANSFERRED RECORDS (OUTPATIENT)
Dept: HEALTH INFORMATION MANAGEMENT | Facility: CLINIC | Age: 78
End: 2023-09-19
Payer: COMMERCIAL

## 2023-09-28 ENCOUNTER — TRANSFERRED RECORDS (OUTPATIENT)
Dept: HEALTH INFORMATION MANAGEMENT | Facility: CLINIC | Age: 78
End: 2023-09-28
Payer: COMMERCIAL

## 2023-10-14 ENCOUNTER — TRANSFERRED RECORDS (OUTPATIENT)
Dept: HEALTH INFORMATION MANAGEMENT | Facility: CLINIC | Age: 78
End: 2023-10-14

## 2023-10-24 ENCOUNTER — TRANSFERRED RECORDS (OUTPATIENT)
Dept: HEALTH INFORMATION MANAGEMENT | Facility: CLINIC | Age: 78
End: 2023-10-24

## 2023-11-07 ENCOUNTER — TRANSFERRED RECORDS (OUTPATIENT)
Dept: HEALTH INFORMATION MANAGEMENT | Facility: CLINIC | Age: 78
End: 2023-11-07

## 2023-11-24 ENCOUNTER — TRANSFERRED RECORDS (OUTPATIENT)
Dept: HEALTH INFORMATION MANAGEMENT | Facility: CLINIC | Age: 78
End: 2023-11-24
Payer: COMMERCIAL

## 2023-12-21 ENCOUNTER — TRANSFERRED RECORDS (OUTPATIENT)
Dept: HEALTH INFORMATION MANAGEMENT | Facility: CLINIC | Age: 78
End: 2023-12-21
Payer: COMMERCIAL

## 2023-12-21 LAB — RETINOPATHY: NEGATIVE

## 2023-12-28 ENCOUNTER — MYC MEDICAL ADVICE (OUTPATIENT)
Dept: PEDIATRICS | Facility: CLINIC | Age: 78
End: 2023-12-28
Payer: COMMERCIAL

## 2024-01-02 NOTE — TELEPHONE ENCOUNTER
General Call    Contacts         Type Contact Phone/Fax    01/02/2024 03:36 PM CST Phone (Incoming) Monet Segura [phone]          Reason for Call:  [employer] Form    What are your questions or concerns:  Monet Segura called to make sure Dr. Sewell has received form. They have faxed a few times to the [place] location. Scheduled verified they are using the correct fa number. If any questions, please call Monet back.    Date of last appointment with provider: n/a    Could we send this information to you in [employer] or would you prefer to receive a phone call?:   Patient would prefer a phone call   Okay to leave a detailed message?: Yes at Other phone number:  Monet: [phone]

## 2024-01-04 NOTE — TELEPHONE ENCOUNTER
Monet called asking if we have received the fax.  I was able to locate the faxes that we have received.  Monet appreciated the update.  Aware that the provider will complete in the next few days as she is not in the clinic today or tomorrow.  He will update the patient.    Joyce Gaitan RN

## 2024-01-08 ENCOUNTER — TELEPHONE (OUTPATIENT)
Dept: PEDIATRICS | Facility: CLINIC | Age: 79
End: 2024-01-08
Payer: COMMERCIAL

## 2024-01-08 ENCOUNTER — TRANSFERRED RECORDS (OUTPATIENT)
Dept: HEALTH INFORMATION MANAGEMENT | Facility: CLINIC | Age: 79
End: 2024-01-08

## 2024-01-08 ENCOUNTER — ANCILLARY PROCEDURE (OUTPATIENT)
Dept: CT IMAGING | Facility: CLINIC | Age: 79
End: 2024-01-08
Attending: THORACIC SURGERY (CARDIOTHORACIC VASCULAR SURGERY)
Payer: COMMERCIAL

## 2024-01-08 DIAGNOSIS — R91.1 SOLITARY LUNG NODULE: ICD-10-CM

## 2024-01-08 PROCEDURE — 71250 CT THORAX DX C-: CPT

## 2024-01-08 NOTE — TELEPHONE ENCOUNTER
Please see the 12/28 encounter-patient was called and this is correct.  The form was faxed.  Joyce Gaitan RN

## 2024-01-08 NOTE — TELEPHONE ENCOUNTER
The form has been filed out by Dr. Sewell for 2 lpm via [employer]  Called and confirmed with the patient that this is how he uses it.    The form was faxed to [employer] at [phone].  Joyce Gaitan RN

## 2024-01-08 NOTE — TELEPHONE ENCOUNTER
[employer] form completed. Please reach out to Theo to confirm 2 liters flow with pulsed rate and he uses with activity.  Yenni Sewell M.D.  [facility]

## 2024-01-22 ENCOUNTER — LAB (OUTPATIENT)
Dept: LAB | Facility: CLINIC | Age: 79
End: 2024-01-22
Payer: COMMERCIAL

## 2024-01-22 DIAGNOSIS — Z12.5 SCREENING FOR PROSTATE CANCER: ICD-10-CM

## 2024-01-22 DIAGNOSIS — N18.1 TYPE 2 DIABETES MELLITUS WITH STAGE 1 CHRONIC KIDNEY DISEASE, WITH LONG-TERM CURRENT USE OF INSULIN (H): Primary | ICD-10-CM

## 2024-01-22 DIAGNOSIS — Z79.4 TYPE 2 DIABETES MELLITUS WITH STAGE 1 CHRONIC KIDNEY DISEASE, WITH LONG-TERM CURRENT USE OF INSULIN (H): Primary | ICD-10-CM

## 2024-01-22 DIAGNOSIS — E11.22 TYPE 2 DIABETES MELLITUS WITH STAGE 1 CHRONIC KIDNEY DISEASE, WITH LONG-TERM CURRENT USE OF INSULIN (H): Primary | ICD-10-CM

## 2024-01-22 LAB
CHOLEST SERPL-MCNC: 103 MG/DL
FASTING STATUS PATIENT QL REPORTED: YES
HBA1C MFR BLD: 7 % (ref 0–5.6)
HDLC SERPL-MCNC: 45 MG/DL
LDLC SERPL CALC-MCNC: 48 MG/DL
NONHDLC SERPL-MCNC: 58 MG/DL
PSA SERPL DL<=0.01 NG/ML-MCNC: 0.5 NG/ML
TRIGL SERPL-MCNC: 52 MG/DL

## 2024-01-22 PROCEDURE — G0103 PSA SCREENING: HCPCS

## 2024-01-22 PROCEDURE — 36415 COLL VENOUS BLD VENIPUNCTURE: CPT

## 2024-01-22 PROCEDURE — 80061 LIPID PANEL: CPT

## 2024-01-22 PROCEDURE — 83036 HEMOGLOBIN GLYCOSYLATED A1C: CPT

## 2024-01-29 ENCOUNTER — OFFICE VISIT (OUTPATIENT)
Dept: PEDIATRICS | Facility: CLINIC | Age: 79
End: 2024-01-29
Payer: COMMERCIAL

## 2024-01-29 VITALS
DIASTOLIC BLOOD PRESSURE: 65 MMHG | SYSTOLIC BLOOD PRESSURE: 115 MMHG | WEIGHT: 202.19 LBS | HEART RATE: 68 BPM | TEMPERATURE: 97.7 F | OXYGEN SATURATION: 97 % | BODY MASS INDEX: 25.27 KG/M2 | RESPIRATION RATE: 16 BRPM

## 2024-01-29 DIAGNOSIS — Z79.4 TYPE 2 DIABETES MELLITUS WITH STAGE 1 CHRONIC KIDNEY DISEASE, WITH LONG-TERM CURRENT USE OF INSULIN (H): ICD-10-CM

## 2024-01-29 DIAGNOSIS — N18.1 TYPE 2 DIABETES MELLITUS WITH STAGE 1 CHRONIC KIDNEY DISEASE, WITH LONG-TERM CURRENT USE OF INSULIN (H): ICD-10-CM

## 2024-01-29 DIAGNOSIS — C34.91 CARCINOMA OF RIGHT LUNG (H): ICD-10-CM

## 2024-01-29 DIAGNOSIS — E11.22 TYPE 2 DIABETES MELLITUS WITH STAGE 1 CHRONIC KIDNEY DISEASE, WITH LONG-TERM CURRENT USE OF INSULIN (H): ICD-10-CM

## 2024-01-29 DIAGNOSIS — J44.9 STAGE 3 SEVERE COPD BY GOLD CLASSIFICATION (H): ICD-10-CM

## 2024-01-29 PROCEDURE — 99214 OFFICE O/P EST MOD 30 MIN: CPT | Performed by: INTERNAL MEDICINE

## 2024-01-29 ASSESSMENT — PAIN SCALES - GENERAL: PAINLEVEL: NO PAIN (0)

## 2024-01-29 NOTE — PROGRESS NOTES
Assessment & Plan     Carcinoma of right lung (H)  Just completed more radiation treatment. Noted an increase in his shortness of breath that coincided with that treatment. Stable since then. Follows with oncology    Stage 3 severe COPD by GOLD classification (H)  On O2 with activity. Reviewed his sleep study. Discussed option to do nocturnal oxygen. He would like to try this and see if he sleeps better or notices any difference.    Type 2 diabetes mellitus with stage 1 chronic kidney disease, with long-term current use of insulin (H)  Stable.    30 minutes spent by me on the date of the encounter doing chart review, history and exam, documentation and further activities per the note        See Patient Instructions    Jeanmarie Venegas is a 78 year old, presenting for the following health issues:  RECHECK        1/29/2024    10:33 AM   Additional Questions   Roomed by Argelia Mcgill CMA   Accompanied by N/A         1/29/2024    10:33 AM   Patient Reported Additional Medications   Patient reports taking the following new medications N/A     History of Present Illness       COPD:  He presents for follow up of COPD.   Overall, COPD symptoms are slightly worse since last visit. He has more than usual fatigue or shortness of breath with exertion and no shortness of breath at rest.  He rarely coughs and does not have change in sputum. No recent fever. He can walk less than 1 block without stopping to rest. He can walk 2 flights of stairs without resting. The patient has had no ED, urgent care, or hospital admissions because of COPD since the last visit.     He eats 2-3 servings of fruits and vegetables daily.He consumes 0 sweetened beverage(s) daily.He exercises with enough effort to increase his heart rate 30 to 60 minutes per day.  He exercises with enough effort to increase his heart rate 3 or less days per week.   He is taking medications regularly.     Feels like breathing is overall getting worse. Runs out of wind  quicker than before. 2 weeks ago went to [place] to visit his brother. 2 liters on plan was sufficient. Previous portable O2 battery life only 2.5 hours. New machine with large battery lasts 5 hours.       COPD Follow-Up  Overall, how are your COPD symptoms since your last clinic visit?  Slightly worse  How much fatigue or shortness of breath do you have when you are walking?  More than usual  How much shortness of breath do you have when you are resting?  None  How often do you cough? Never  Have you noticed any change in your sputum/phlegm?  No  Have you experienced a recent fever? No  Please describe how far you can walk without stopping to rest:  2-5 blocks without O2.  How many flights of stairs are you able to walk up without stopping?  None without oxygen  Have you had any Emergency Room Visits, Urgent Care Visits, or Hospital Admissions because of your COPD since your last office visit?  No    History   Smoking Status    Former    Packs/day: 0.50    Years: 47.00    Types: Cigarettes, Cigars, Pipe    Start date: 3/1/1966    Quit date: 10/1/2013   Smokeless Tobacco    Never     Diabetes Follow-up    How often are you checking your blood sugar? One time daily  What time of day are you checking your blood sugars (select all that apply)?  Before meals  Have you had any blood sugars above 200?  No  Have you had any blood sugars below 70?  N  What concerns do you have today about your diabetes? None   Do you have any of these symptoms? (Select all that apply)  No numbness or tingling in feet.  No redness, sores or blisters on feet.  No complaints of excessive thirst.  No reports of blurry vision.  No significant changes to weight.      BP Readings from Last 2 Encounters:   01/29/24 115/65   07/31/23 111/66     Hemoglobin A1C (%)   Date Value   01/22/2024 7.0 (H)   07/31/2023 6.8 (H)   04/26/2021 6.0 (H)   09/22/2020 5.7 (H)     LDL Cholesterol Calculated (mg/dL)   Date Value   01/22/2024 48   02/21/2023 47   09/22/2020  44   07/17/2019 54         Review of Systems  Constitutional, HEENT, cardiovascular, pulmonary, gi and gu systems are negative, except as otherwise noted.      Objective    /65 (BP Location: Right arm, Patient Position: Sitting, Cuff Size: Adult Regular)   Pulse 68   Temp 97.7  F (36.5  C) (Oral)   Resp 16   Wt 91.7 kg (202 lb 3 oz)   SpO2 97%   BMI 25.27 kg/m    Body mass index is 25.27 kg/m .  Physical Exam   GENERAL: alert and no distress  NECK: no adenopathy, no asymmetry, masses, or scars  RESP: lungs clear to auscultation - no rales, rhonchi or wheezes  CV: regular rate and rhythm, normal S1 S2, no S3 or S4, no murmur, click or rub, no peripheral edema  ABDOMEN: soft, nontender, no hepatosplenomegaly, no masses and bowel sounds normal  MS: no gross musculoskeletal defects noted, no edema    Lab on 01/22/2024   Component Date Value Ref Range Status    Hemoglobin A1C 01/22/2024 7.0 (H)  0.0 - 5.6 % Final    Normal <5.7%   Prediabetes 5.7-6.4%    Diabetes 6.5% or higher     Note: Adopted from ADA consensus guidelines.    Cholesterol 01/22/2024 103  <200 mg/dL Final    Triglycerides 01/22/2024 52  <150 mg/dL Final    Direct Measure HDL 01/22/2024 45  >=40 mg/dL Final    LDL Cholesterol Calculated 01/22/2024 48  <=100 mg/dL Final    Non HDL Cholesterol 01/22/2024 58  <130 mg/dL Final    Patient Fasting > 8hrs? 01/22/2024 Yes   Final    Prostate Specific Antigen Screen 01/22/2024 0.50  ng/mL Final    No reference ranges have been established for patients over 80 years.           Signed Electronically by: Yenni Sewell MD

## 2024-02-23 DIAGNOSIS — N18.2 TYPE 2 DIABETES MELLITUS WITH STAGE 2 CHRONIC KIDNEY DISEASE, WITH LONG-TERM CURRENT USE OF INSULIN (H): Primary | ICD-10-CM

## 2024-02-23 DIAGNOSIS — Z79.4 TYPE 2 DIABETES MELLITUS WITH STAGE 2 CHRONIC KIDNEY DISEASE, WITH LONG-TERM CURRENT USE OF INSULIN (H): Primary | ICD-10-CM

## 2024-02-23 DIAGNOSIS — E11.22 TYPE 2 DIABETES MELLITUS WITH STAGE 2 CHRONIC KIDNEY DISEASE, WITH LONG-TERM CURRENT USE OF INSULIN (H): Primary | ICD-10-CM

## 2024-02-23 NOTE — TELEPHONE ENCOUNTER
Medication Question or Refill        What medication are you calling about (include dose and sig)?: pharmacy faxed over a request for a new script for lancet (soft Clix)    Preferred Pharmacy:  [facility] [address]  [place] - [address]  [address]  [place] [address]  Phone: [phone] Fax: [phone]      Controlled Substance Agreement on file:   CSA -- Patient Level:    CSA: None found at the patient level.       Who prescribed the medication?: Requesting PCP to prescribe soft clix lancets      Do you need a refill? No    When did you use the medication last? N/a    Patient offered an appointment? No    Do you have any questions or concerns?  No      Could we send this information to you in [facility] or would you prefer to receive a phone call?:   No preference   Okay to leave a detailed message?: Yes at Home number on file [phone] (home)

## 2024-02-26 RX ORDER — LANCETS
EACH MISCELLANEOUS
Qty: 100 EACH | Refills: 6 | Status: SHIPPED | OUTPATIENT
Start: 2024-02-26

## 2024-02-26 RX ORDER — GLUCOSAMINE HCL/CHONDROITIN SU 500-400 MG
CAPSULE ORAL
Qty: 100 EACH | Refills: 6 | Status: CANCELLED | OUTPATIENT
Start: 2024-02-26

## 2024-02-26 RX ORDER — LANCING DEVICE/LANCETS
KIT MISCELLANEOUS
Qty: 1 EACH | Refills: 0 | Status: CANCELLED | OUTPATIENT
Start: 2024-02-26

## 2024-02-26 RX ORDER — LANCETS
EACH MISCELLANEOUS
Refills: 6 | Status: CANCELLED | OUTPATIENT
Start: 2024-02-26

## 2024-02-26 NOTE — TELEPHONE ENCOUNTER
Dr. Sewlel,    Pended Softclix lancets, please review order and sig. Thanks!  Juvenal Arciniega RN on 2/26/2024 at 2:30 PM

## 2024-02-27 ENCOUNTER — TRANSFERRED RECORDS (OUTPATIENT)
Dept: HEALTH INFORMATION MANAGEMENT | Facility: CLINIC | Age: 79
End: 2024-02-27
Payer: COMMERCIAL

## 2024-02-27 LAB
ALT SERPL-CCNC: 25 IU/L (ref 5–40)
AST SERPL-CCNC: 26 U/L (ref 5–34)
CREATININE (EXTERNAL): 1.19 MG/DL (ref 0.5–1.3)

## 2024-03-11 ENCOUNTER — DOCUMENTATION ONLY (OUTPATIENT)
Dept: PEDIATRICS | Facility: CLINIC | Age: 79
End: 2024-03-11
Payer: COMMERCIAL

## 2024-03-11 DIAGNOSIS — J44.9 STAGE 3 SEVERE COPD BY GOLD CLASSIFICATION (H): Primary | ICD-10-CM

## 2024-03-14 ENCOUNTER — TRANSCRIBE ORDERS (OUTPATIENT)
Dept: OTHER | Age: 79
End: 2024-03-14

## 2024-03-14 DIAGNOSIS — J44.9 COPD, SEVERE (H): Primary | ICD-10-CM

## 2024-03-14 NOTE — PROGRESS NOTES
Called to let the patient know that Dr. Sewell has signed the order for oxygen for him.  He was advised and the order was signed-this is a renewal of an existing order.  He will call the [facility].    Joyce Gaitan RN

## 2024-04-23 NOTE — MR AVS SNAPSHOT
After Visit Summary   10/22/2018    Lauri Mcgee    MRN: [id number]           Patient Information     Date Of Birth          1945        Visit Information        Provider Department      10/22/2018 9:10 AM Yenni Sewell MD [facility]        Today's Diagnoses     Type 2 diabetes mellitus with stage 1 chronic kidney disease, with long-term current use of insulin (H)    -  1    Screening for prostate cancer        CARDIOVASCULAR SCREENING; LDL GOAL LESS THAN 130        Type 2 diabetes mellitus with stage 2 chronic kidney disease, with long-term current use of insulin (H)          Care Instructions    nonfasting lab in 3 months.     Lab later this week with previous planned draw.          Follow-ups after your visit        Additional Services     DIABETES EDUCATOR REFERRAL       DIABETES SELF MANAGEMENT TRAINING (DSMT)      Your provider has referred you to Diabetes Education: [facility]: Diabetes Education - All [facility] [phone]   [email or web address]     If an urgent visit is needed or A1C is above 12, Care Team to call the Diabetes  Education Team at [phone] or send an In Basket message to the Diabetes Education Pool (P DIAB ED-PATIENT CARE).    A  will call you to make your appointment. If it has been more than 3 business days since your referral was placed, please call the above phone number to schedule.    Type of training and number of hours: Previous Diagnosis: Follow-up DSMT - 2 hours.    Diabetes Type: Type 2 - On Oral Medication   Medicare covers: 10 hours of initial DSMT in 12 month period from the time of first visit, plus 2 hours of follow-up DSMT annually, and additional hours as requested for insulin training.         Diabetes Co-Morbidities: dyslipidemia and kidney disease               A1C Goal:  <7.0       A1C is: Lab Results       Component                Value               Date                        A1C                      5.6                 07/23/2018              MEDICAL NUTRITION THERAPY (MNT) for Diabetes    Medical Nutrition Therapy with a Registered Dietitian can be provided in coordination with Diabetes Self-Management Training to assist in achieving optimal diabetes management.    MNT Type and Hours: Previous diagnosis: Annual follow-up MNT - 2 hours                       Medicare will cover: 3 hours initial MNT in 12 month period after first visit, plus 2 hours of follow-up MNT annually        Diabetes Education Topics: Comprehensive Knowledge Assessment and Instruction    Special Educational Needs Requiring Individual DSMT: None      Please be aware that coverage of these services is subject to the terms and limitations of your health insurance plan.  Call member services at your health plan to determine Diabetes Self-Management Training (Codes  and ) and Medical Nutrition Therapy (Codes 38583 and 06126) benefits and ask which blood glucose monitor brands are covered by your plan.  Please bring the following with you to your appointment:    (1)  List of current medications   (2)  List of Blood Glucose Monitor brands that are covered by your insurance plan  (3)  Blood Glucose Monitor and log book  (4)   Food records for the 3 days prior to your visit    The Certified Diabetes Educator may make diabetes medication adjustments per the CDE Protocol and Collaborative Practice Agreement.                  Follow-up notes from your care team     Return in about 3 months (around 1/22/2019) for Lab Work, Preventive Visit.      Future tests that were ordered for you today     Open Future Orders        Priority Expected Expires Ordered    Prostate spec antigen screen Routine  1/20/2019 10/22/2018    **Basic metabolic panel FUTURE anytime Routine 10/22/2018 10/22/2019 10/22/2018    **TSH with free T4 reflex FUTURE anytime Routine 10/22/2018 10/22/2019 10/22/2018            Who to contact     If  "you have questions or need follow up information about today's clinic visit or your schedule please contact [facility] [place] directly at [phone].  Normal or non-critical lab and imaging results will be communicated to you by [facility], letter or phone within 4 business days after the clinic has received the results. If you do not hear from us within 7 days, please contact the clinic through [facility] or phone. If you have a critical or abnormal lab result, we will notify you by phone as soon as possible.  Submit refill requests through [employer] or call your pharmacy and they will forward the refill request to us. Please allow 3 business days for your refill to be completed.          Additional Information About Your Visit        [facility] Information     [employer] gives you secure access to your electronic health record. If you see a primary care provider, you can also send messages to your care team and make appointments. If you have questions, please call your primary care clinic.  If you do not have a primary care provider, please call [phone] and they will assist you.        Care EveryWhere ID     This is your Care EveryWhere ID. This could be used by other organizations to access your [place] medical records  [id number]        Your Vitals Were     Pulse Temperature Height Pulse Oximetry BMI (Body Mass Index)       61 98.8  F (37.1  C) (Oral) 6' 3\" (1.905 m) 96% 24.81 kg/m2        Blood Pressure from Last 3 Encounters:   10/22/18 110/62   06/25/18 128/62   04/30/18 128/68    Weight from Last 3 Encounters:   10/22/18 198 lb 8 oz (90 kg)   06/25/18 198 lb 6.4 oz (90 kg)   04/30/18 200 lb (90.7 kg)              We Performed the Following     Albumin Random Urine Quantitative with Creat Ratio     DIABETES EDUCATOR REFERRAL          Today's Medication Changes          These changes are accurate as of 10/22/18 10:28 AM.  If you have any questions, ask your nurse or doctor.               These medicines have " changed or have updated prescriptions.        Dose/Directions    metFORMIN 500 MG 24 hr tablet   Commonly known as:  GLUCOPHAGE-XR   This may have changed:  See the new instructions.   Used for:  Type 2 diabetes mellitus with stage 2 chronic kidney disease, with long-term current use of insulin (H)   Changed by:  Yenni Sewell MD        Dose:  1000 mg   Take 2 tablets (1,000 mg) by mouth 2 times daily (with meals)   Quantity:  360 tablet   Refills:  11            Where to get your medicines      These medications were sent to [id number] - [place] - [address]  [address] [address]     Phone:  [phone]     atorvastatin 10 MG tablet    lisinopril 2.5 MG tablet    metFORMIN 500 MG 24 hr tablet                Primary Care Provider Office Phone # Fax #    Yenni Sewell [phone] [address] [place] [address]        Equal Access to Services     [facility] AH: Hadii aad ku hadasho Soomaali, waaxda luqadaha, qaybta kaalmada adeegyada, waxay ngocin haydavonten carlos garrido . So [facility] [phone].    ATENCIÓN: Si habla español, tiene a tovar disposición servicios gratuitos de asistencia lingüística. [facility] [phone].    We comply with applicable federal civil rights laws and [place] laws. We do not discriminate on the basis of race, color, national origin, age, disability, sex, sexual orientation, or gender identity.            Thank you!     Thank you for choosing [facility]  for your care. Our goal is always to provide you with excellent care. Hearing back from our patients is one way we can continue to improve our services. Please take a few minutes to complete the written survey that you may receive in the mail after your visit with us. Thank you!             Your Updated Medication List - Protect others around you: Learn how to safely use, store and throw away your medicines at www.disposemymeds.org.           This list is accurate as of 10/22/18 10:28 AM.  Always use your most recent med list.                   Brand Name Dispense Instructions for use Diagnosis    albuterol 90 MCG/ACT inhaler      Inhale 2 puffs into the lungs every 6 hours as needed.        aspirin 81 MG tablet      Take 1 tablet by mouth daily        atorvastatin 10 MG tablet    LIPITOR    90 tablet    Take 1 tablet (10 mg) by mouth daily    CARDIOVASCULAR SCREENING; LDL GOAL LESS THAN 130       blood glucose monitoring lancets     200 each    USE ONE  TO CHECK GLUCOSE THREE TIMES DAILY    Diabetes mellitus, drug-related (H)       CONTOUR NEXT TEST test strip   Generic drug:  blood glucose monitoring     300 strip    USE ONE STRIP TO CHECK GLUCOSE THREE TIMES DAILY BY  IN  VITRO  ROUTE    Diabetes mellitus, drug-related (H)       fluticasone furoate 100 MCG/ACT inhaler    ARNUITY ELLIPTA    3 each    Inhale 1 puff into the lungs daily    Chronic obstructive pulmonary disease, unspecified COPD type (H)       IMURAN 50 MG tablet   Generic drug:  azaTHIOprine     30 tablet    Take 100 mg by mouth daily        insulin aspart 100 UNIT/ML injection    NovoLOG FLEXPEN    15 mL    Check your blood sugar before meals as directed. Cover with novolog as follows: 201-250 = 4 units 251-300 = 6 units 301-350 = 8 units >350 = 10 units and recheck blood sugar in one hour.    Type 2 diabetes mellitus with stage 2 chronic kidney disease, unspecified long term insulin use status       lisinopril 2.5 MG tablet    PRINIVIL/Zestril    90 tablet    Take 1 tablet (2.5 mg) by mouth daily    Type 2 diabetes mellitus with stage 1 chronic kidney disease, with long-term current use of insulin (H)       metFORMIN 500 MG 24 hr tablet    GLUCOPHAGE-XR    360 tablet    Take 2 tablets (1,000 mg) by mouth 2 times daily (with meals)    Type 2 diabetes mellitus with stage 2 chronic kidney disease, with long-term current use of insulin (H)       predniSONE 5 MG tablet    DELTASONE      Take 1 tablet by mouth daily        ranitidine 300 MG tablet    ZANTAC    90 tablet    Take 1 tablet (300 mg) by mouth At Bedtime    Gastroesophageal reflux disease without esophagitis       umeclidinium-vilanterol 62.5-25 MCG/INH oral inhaler    ANORO ELLIPTA     Inhale 1 puff into the lungs daily        vitamin D 1000 units capsule      Take 1 capsule by mouth daily.           No

## 2024-05-28 ENCOUNTER — TRANSFERRED RECORDS (OUTPATIENT)
Dept: HEALTH INFORMATION MANAGEMENT | Facility: CLINIC | Age: 79
End: 2024-05-28
Payer: COMMERCIAL

## 2024-05-28 LAB
ALT SERPL-CCNC: 17 IU/L (ref 5–40)
AST SERPL-CCNC: 17 U/L (ref 5–34)
CREATININE (EXTERNAL): 1.1 MG/DL (ref 0.5–1.3)

## 2024-07-01 ENCOUNTER — PATIENT OUTREACH (OUTPATIENT)
Dept: CARE COORDINATION | Facility: CLINIC | Age: 79
End: 2024-07-01
Payer: COMMERCIAL

## 2024-07-15 ENCOUNTER — PATIENT OUTREACH (OUTPATIENT)
Dept: CARE COORDINATION | Facility: CLINIC | Age: 79
End: 2024-07-15
Payer: COMMERCIAL

## 2024-07-29 ENCOUNTER — TRANSFERRED RECORDS (OUTPATIENT)
Dept: HEALTH INFORMATION MANAGEMENT | Facility: CLINIC | Age: 79
End: 2024-07-29

## 2024-07-29 ENCOUNTER — HOSPITAL ENCOUNTER (OUTPATIENT)
Dept: CT IMAGING | Facility: CLINIC | Age: 79
Discharge: HOME | End: 2024-07-29
Attending: THORACIC SURGERY (CARDIOTHORACIC VASCULAR SURGERY) | Admitting: THORACIC SURGERY (CARDIOTHORACIC VASCULAR SURGERY)
Payer: COMMERCIAL

## 2024-07-29 DIAGNOSIS — R91.1 SOLITARY PULMONARY NODULE: ICD-10-CM

## 2024-07-29 PROCEDURE — 71250 CT THORAX DX C-: CPT

## 2024-07-30 ENCOUNTER — OFFICE VISIT (OUTPATIENT)
Dept: PEDIATRICS | Facility: CLINIC | Age: 79
End: 2024-07-30
Payer: COMMERCIAL

## 2024-07-30 ENCOUNTER — HOSPITAL ENCOUNTER (OUTPATIENT)
Dept: ULTRASOUND IMAGING | Facility: CLINIC | Age: 79
Discharge: HOME | End: 2024-07-30
Attending: INTERNAL MEDICINE | Admitting: INTERNAL MEDICINE
Payer: COMMERCIAL

## 2024-07-30 ENCOUNTER — ANCILLARY PROCEDURE (OUTPATIENT)
Dept: GENERAL RADIOLOGY | Facility: CLINIC | Age: 79
End: 2024-07-30
Attending: INTERNAL MEDICINE
Payer: COMMERCIAL

## 2024-07-30 VITALS
SYSTOLIC BLOOD PRESSURE: 106 MMHG | DIASTOLIC BLOOD PRESSURE: 54 MMHG | HEIGHT: 75 IN | RESPIRATION RATE: 12 BRPM | BODY MASS INDEX: 25.49 KG/M2 | HEART RATE: 68 BPM | OXYGEN SATURATION: 97 % | TEMPERATURE: 97.9 F | WEIGHT: 205 LBS

## 2024-07-30 DIAGNOSIS — J44.9 STAGE 3 SEVERE COPD BY GOLD CLASSIFICATION (H): ICD-10-CM

## 2024-07-30 DIAGNOSIS — M54.41 ACUTE RIGHT-SIDED LOW BACK PAIN WITH RIGHT-SIDED SCIATICA: ICD-10-CM

## 2024-07-30 DIAGNOSIS — R10.31 RIGHT LOWER QUADRANT PAIN: ICD-10-CM

## 2024-07-30 DIAGNOSIS — E78.00 HYPERCHOLESTEROLEMIA: ICD-10-CM

## 2024-07-30 DIAGNOSIS — M79.661 RIGHT CALF PAIN: ICD-10-CM

## 2024-07-30 DIAGNOSIS — R94.4 DECREASED GFR: ICD-10-CM

## 2024-07-30 DIAGNOSIS — R12 HEARTBURN: ICD-10-CM

## 2024-07-30 DIAGNOSIS — E11.22 TYPE 2 DIABETES MELLITUS WITH STAGE 1 CHRONIC KIDNEY DISEASE, WITH LONG-TERM CURRENT USE OF INSULIN (H): Primary | ICD-10-CM

## 2024-07-30 DIAGNOSIS — C34.91 CARCINOMA OF RIGHT LUNG (H): ICD-10-CM

## 2024-07-30 DIAGNOSIS — N18.1 TYPE 2 DIABETES MELLITUS WITH STAGE 1 CHRONIC KIDNEY DISEASE, WITH LONG-TERM CURRENT USE OF INSULIN (H): Primary | ICD-10-CM

## 2024-07-30 DIAGNOSIS — Z79.4 TYPE 2 DIABETES MELLITUS WITH STAGE 1 CHRONIC KIDNEY DISEASE, WITH LONG-TERM CURRENT USE OF INSULIN (H): Primary | ICD-10-CM

## 2024-07-30 DIAGNOSIS — K68.2 RETROPERITONEAL FIBROSIS: ICD-10-CM

## 2024-07-30 PROBLEM — R91.8 ABNORMAL CT LUNG SCREENING: Status: RESOLVED | Noted: 2019-07-25 | Resolved: 2024-07-30

## 2024-07-30 LAB
BASOPHILS # BLD AUTO: 0 10E3/UL (ref 0–0.2)
BASOPHILS NFR BLD AUTO: 0 %
EOSINOPHIL # BLD AUTO: 0.1 10E3/UL (ref 0–0.7)
EOSINOPHIL NFR BLD AUTO: 2 %
ERYTHROCYTE [DISTWIDTH] IN BLOOD BY AUTOMATED COUNT: 14 % (ref 10–15)
HBA1C MFR BLD: 7.5 % (ref 0–5.6)
HCT VFR BLD AUTO: 38.7 % (ref 40–53)
HGB BLD-MCNC: 12.5 G/DL (ref 13.3–17.7)
IMM GRANULOCYTES # BLD: 0 10E3/UL
IMM GRANULOCYTES NFR BLD: 0 %
LYMPHOCYTES # BLD AUTO: 0.6 10E3/UL (ref 0.8–5.3)
LYMPHOCYTES NFR BLD AUTO: 11 %
MCH RBC QN AUTO: 32.8 PG (ref 26.5–33)
MCHC RBC AUTO-ENTMCNC: 32.3 G/DL (ref 31.5–36.5)
MCV RBC AUTO: 102 FL (ref 78–100)
MONOCYTES # BLD AUTO: 0.7 10E3/UL (ref 0–1.3)
MONOCYTES NFR BLD AUTO: 13 %
NEUTROPHILS # BLD AUTO: 4.1 10E3/UL (ref 1.6–8.3)
NEUTROPHILS NFR BLD AUTO: 74 %
PLATELET # BLD AUTO: 185 10E3/UL (ref 150–450)
RBC # BLD AUTO: 3.81 10E6/UL (ref 4.4–5.9)
WBC # BLD AUTO: 5.5 10E3/UL (ref 4–11)

## 2024-07-30 PROCEDURE — 99215 OFFICE O/P EST HI 40 MIN: CPT | Performed by: INTERNAL MEDICINE

## 2024-07-30 PROCEDURE — 82043 UR ALBUMIN QUANTITATIVE: CPT | Performed by: INTERNAL MEDICINE

## 2024-07-30 PROCEDURE — 83036 HEMOGLOBIN GLYCOSYLATED A1C: CPT | Performed by: INTERNAL MEDICINE

## 2024-07-30 PROCEDURE — 99207 PR FOOT EXAM NO CHARGE: CPT | Performed by: INTERNAL MEDICINE

## 2024-07-30 PROCEDURE — G2211 COMPLEX E/M VISIT ADD ON: HCPCS | Performed by: INTERNAL MEDICINE

## 2024-07-30 PROCEDURE — 80053 COMPREHEN METABOLIC PANEL: CPT | Performed by: INTERNAL MEDICINE

## 2024-07-30 PROCEDURE — 36415 COLL VENOUS BLD VENIPUNCTURE: CPT | Performed by: INTERNAL MEDICINE

## 2024-07-30 PROCEDURE — 85025 COMPLETE CBC W/AUTO DIFF WBC: CPT | Performed by: INTERNAL MEDICINE

## 2024-07-30 PROCEDURE — 82570 ASSAY OF URINE CREATININE: CPT | Performed by: INTERNAL MEDICINE

## 2024-07-30 PROCEDURE — 72100 X-RAY EXAM L-S SPINE 2/3 VWS: CPT | Mod: TC | Performed by: RADIOLOGY

## 2024-07-30 PROCEDURE — 93971 EXTREMITY STUDY: CPT | Mod: RT

## 2024-07-30 RX ORDER — LISINOPRIL 2.5 MG/1
2.5 TABLET ORAL DAILY
Qty: 90 TABLET | Refills: 3 | Status: SHIPPED | OUTPATIENT
Start: 2024-07-30

## 2024-07-30 RX ORDER — METFORMIN HCL 500 MG
1500 TABLET, EXTENDED RELEASE 24 HR ORAL
Qty: 270 TABLET | Refills: 11 | Status: SHIPPED | OUTPATIENT
Start: 2024-07-30

## 2024-07-30 RX ORDER — FAMOTIDINE 20 MG/1
20 TABLET, FILM COATED ORAL 2 TIMES DAILY
Qty: 180 TABLET | Refills: 4 | Status: SHIPPED | OUTPATIENT
Start: 2024-07-30

## 2024-07-30 RX ORDER — GABAPENTIN 100 MG/1
CAPSULE ORAL
Qty: 180 CAPSULE | Refills: 1 | Status: SHIPPED | OUTPATIENT
Start: 2024-07-30 | End: 2024-09-24

## 2024-07-30 RX ORDER — ATORVASTATIN CALCIUM 10 MG/1
10 TABLET, FILM COATED ORAL DAILY
Qty: 90 TABLET | Refills: 3 | Status: SHIPPED | OUTPATIENT
Start: 2024-07-30

## 2024-07-30 NOTE — PROGRESS NOTES
Assessment & Plan     Type 2 diabetes mellitus with stage 1 chronic kidney disease, with long-term current use of insulin (H)  Glucose levels higher than previously. Plan add Jardiance  - HEMOGLOBIN A1C; Future  - Albumin Random Urine Quantitative with Creat Ratio; Future  - FOOT EXAM  - Comprehensive metabolic panel (BMP + Alb, Alk Phos, ALT, AST, Total. Bili, TP); Future  - metFORMIN (GLUCOPHAGE XR) 500 MG 24 hr tablet; Take 3 tablets (1,500 mg) by mouth daily (with dinner)  - empagliflozin (JARDIANCE) 10 MG TABS tablet; Take 1 tablet (10 mg) by mouth daily  - HEMOGLOBIN A1C  - Albumin Random Urine Quantitative with Creat Ratio  - Comprehensive metabolic panel (BMP + Alb, Alk Phos, ALT, AST, Total. Bili, TP)    Stage 3 severe COPD by GOLD classification (H)  Some increase in symptoms since course of radiation therapy last year, stable since then.     Carcinoma of right lung (H)  Follows with [facility], Dr. Clarke.    Heartburn  Well controlled.  - famotidine (PEPCID) 20 MG tablet; Take 1 tablet (20 mg) by mouth 2 times daily    Acute right-sided low back pain with right-sided sciatica/RLQ pain  Xr doesn't snow compression fracture or other issues. Symptoms most consistent with musculoskeletal etiology. Plan PT and trial on gabapentin. Discussed whether this could be renal in origin, given his retroperitoneal fibrosis and pain radiation to RLQ and groin. Will see how he does with PT. Further evaluation as indicated  - XR Lumbar Spine 2/3 Views; Future  - CBC with platelets and differential; Future  - CBC with platelets and differential  - Physical Therapy  Referral; Future  - gabapentin (NEURONTIN) 100 MG capsule; 3 capsules by mouth at bedtime, then 1-2 capsules twice daily as needed for back pain.    Right calf pain  No palaable cords or increase in his typical pedal edema. Given current malignancy, will check US.  - US Lower Extremity Venous Duplex Right; Future    Hypercholesterolemia  On  "statin.    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 Recommended US renal if he develops any potential renal symptoms. Given his back pain, though likely musculoskeletal, must consider ultrasound if symptoms don't improve.      50 minutes spent by me on the date of the encounter doing chart review, history and exam, documentation and further activities per the note    The longitudinal plan of care for the diagnosis(es)/condition(s) as documented were addressed during this visit. Due to the added complexity in care, I will continue to support Theo in the subsequent management and with ongoing continuity of care.    BMI  Estimated body mass index is 25.62 kg/m  as calculated from the following:    Height as of this encounter: 1.905 m (6' 3\").    Weight as of this encounter: 93 kg (205 lb).         See Patient Instructions    Subjective   Theo is a 78 year old, presenting for the following health issues:  Follow Up        7/30/2024    10:04 AM   Additional Questions   Roomed by Any Marcelo CMA     History of Present Illness       COPD:  He presents for follow up of COPD.  Overall, COPD symptoms are stable since last visit.  He has same as usual fatigue or shortness of breath with exertion and no shortness of breath at rest.  He rarely coughs and does not have change in sputum. No recent fever. He can walk 2-5 blocks without stopping to rest. He can walk 2 flights of stairs without resting. The patient has had no ED, urgent care, or hospital admissions because of COPD since the last visit.     Diabetes:   He presents for follow up of diabetes.  He is checking home blood glucose one time daily.   He checks blood glucose before meals.  Blood glucose is sometimes over 200 and never under 70.  When his blood glucose is low, the patient is asymptomatic for confusion, blurred vision, lethargy and reports not feeling dizzy, shaky, or weak.   He has no concerns regarding his diabetes at this time.  He is having " "numbness in feet.            He eats 0-1 servings of fruits and vegetables daily.He consumes 0 sweetened beverage(s) daily.He exercises with enough effort to increase his heart rate 30 to 60 minutes per day.  He exercises with enough effort to increase his heart rate 6 days per week.   He is taking medications regularly.     Since last visit, feels SOB is about the same. Did see Dr. Clarke yesterday.    Sore R hip and calf. Came on about a week ago. Not sure if really worsening.  Hasn't been able to sleep through the night since. Woke up with it once morning. Calf muscles feel tender. Some muscle twitching in both calves. If he exercises such as going up and down stairs, it helps. Not instantly, takes an hour or two.    Pain in R low back, muscle that runs from spine down to R groin. Pain feels like a muscle wrapping around. Once it starts to hurt, it's uncomfortable. Better if he does a little walking or stairs. If he sits around or falls asleep in a chair it's worse. Back pain is there all the time. More like an ache. Sometimes can find a more comfortable position in the chair, but when he goes to stand up, it's bad, like 7-8/10. Not letting him sleep more than 2 hours at a time. No numbness/tingling/weakness.    No change in urination. Glucose levels 180-200 at times.         Review of Systems  Constitutional, HEENT, cardiovascular, pulmonary, gi and gu systems are negative, except as otherwise noted.      Objective    /54 (BP Location: Right arm, Patient Position: Sitting, Cuff Size: Adult Large)   Pulse 68   Temp 97.9  F (36.6  C) (Temporal)   Resp 12   Ht 1.905 m (6' 3\")   Wt 93 kg (205 lb)   SpO2 97%   BMI 25.62 kg/m    Body mass index is 25.62 kg/m .  Physical Exam   GENERAL: alert and no distress  NECK: no adenopathy, no asymmetry, masses, or scars  RESP: lungs clear to auscultation - no rales, rhonchi or wheezes  CV: regular rate and rhythm, normal S1 S2, no S3 or S4, no murmur, click or rub, " no peripheral edema  ABDOMEN: soft, nontender, no hepatosplenomegaly, no masses and bowel sounds normal  MS: no gross musculoskeletal defects noted, no edema  Comprehensive back pain exam:  tenderness to palpation over R paralumbar musculature. No spinous process tenderness. Normal motor strength bilateral LE. DTR's patellar 2+ and symmetric    XR lumbar: no fractures.         Signed Electronically by: Yenni Sewell MD

## 2024-07-30 NOTE — PATIENT INSTRUCTIONS
You are due for a tetanus vaccine booster at your pharmacy.    Let's plan on starting the Jardiance if your A1C is >7. Start this after you are settled on gabapentin.    Start gabapentin 3 capsules at bedtime, then start with 1 capsule twice daily, but may increase to 2 capsules.    A1C in 3 months.

## 2024-07-31 LAB
ALBUMIN SERPL BCG-MCNC: 4.4 G/DL (ref 3.5–5.2)
ALP SERPL-CCNC: 56 U/L (ref 40–150)
ALT SERPL W P-5'-P-CCNC: 24 U/L (ref 0–70)
ANION GAP SERPL CALCULATED.3IONS-SCNC: 11 MMOL/L (ref 7–15)
AST SERPL W P-5'-P-CCNC: 22 U/L (ref 0–45)
BILIRUB SERPL-MCNC: 0.6 MG/DL
BUN SERPL-MCNC: 25 MG/DL (ref 8–23)
CALCIUM SERPL-MCNC: 10 MG/DL (ref 8.8–10.4)
CHLORIDE SERPL-SCNC: 101 MMOL/L (ref 98–107)
CREAT SERPL-MCNC: 1.28 MG/DL (ref 0.67–1.17)
CREAT UR-MCNC: 118 MG/DL
EGFRCR SERPLBLD CKD-EPI 2021: 57 ML/MIN/1.73M2
GLUCOSE SERPL-MCNC: 147 MG/DL (ref 70–99)
HCO3 SERPL-SCNC: 24 MMOL/L (ref 22–29)
MICROALBUMIN UR-MCNC: 33.8 MG/L
MICROALBUMIN/CREAT UR: 28.64 MG/G CR (ref 0–17)
POTASSIUM SERPL-SCNC: 4.8 MMOL/L (ref 3.4–5.3)
PROT SERPL-MCNC: 6.8 G/DL (ref 6.4–8.3)
SODIUM SERPL-SCNC: 136 MMOL/L (ref 135–145)

## 2024-08-05 ENCOUNTER — THERAPY VISIT (OUTPATIENT)
Dept: PHYSICAL THERAPY | Facility: CLINIC | Age: 79
End: 2024-08-05
Attending: INTERNAL MEDICINE
Payer: COMMERCIAL

## 2024-08-05 DIAGNOSIS — M54.41 ACUTE RIGHT-SIDED LOW BACK PAIN WITH RIGHT-SIDED SCIATICA: ICD-10-CM

## 2024-08-05 PROCEDURE — 97110 THERAPEUTIC EXERCISES: CPT | Mod: GP | Performed by: PHYSICAL THERAPIST

## 2024-08-05 PROCEDURE — 97161 PT EVAL LOW COMPLEX 20 MIN: CPT | Mod: GP | Performed by: PHYSICAL THERAPIST

## 2024-08-05 NOTE — PROGRESS NOTES
"PHYSICAL THERAPY EVALUATION  Type of Visit: Evaluation       Fall Risk Screen:  Fall screen completed by: PT  Have you fallen 2 or more times in the past year?: No  Have you fallen and had an injury in the past year?: No  Is patient a fall risk?: No    Subjective       Presenting condition or subjective complaint: Leg or back pain whenever I have been \"too inactive.\"    Reports pain in right low back and upper buttock and into right groin area. Constant achiness that varies in intensity. Started on gabepentin and that seems to help some. Some intermittent pain also right calf, not sure if that is related to low back. Pain will disturb sleep, pain on both right and left side.  MD note states could also be coming from renal area as has history of renal fibrosis. Has appt on Friday for further testing for this.   Date of onset: 07/30/24 (md referral date)    Relevant medical history: Arthritis; Cancer; COPD; Diabetes; Hearing problems; History of fractures; Kidney disease; Numbness or tingling in perianal area; Pain at night or rest; Radiation treatment; Sleep disorder like apnea; Vision problems   Dates & types of surgery: cataract surgery August 2008 and March 2012; disc Jan 1972; tonsillectomy March 1948.    Reports lumbar surgery 1972 for disc herniation, symptoms were in left LE at that time.   Prior diagnostic imaging/testing results: X-ray; Other sonogram   Prior therapy history for the same diagnosis, illness or injury: No          Living Environment  Social support: Alone   Type of home: [facility]   Stairs to enter the home: Yes 2 Is there a railing: No     Ramp: No   Stairs inside the home: Yes 2 Is there a railing: Yes     Help at home: None  Equipment owned:       Employment: No    Hobbies/Interests: golf, curling, reading, crossword puzzles, sukodu  Reports has been able to golf without increased pain.     Patient goals for therapy: Sleep through the night (8 hours).         Objective   LUMBAR SPINE " EVALUATION  PAIN: Pain Location: right low back, and also some right calf pain  Pain Frequency: constant  Pain is Exacerbated By: prolonged sitting and sit to stand; driving  Pain is Relieved By: walking and stairs    POSTURE: Sitting Posture: Rounded shoulders, Forward head, Lordosis decreased     ROM:   (Degrees) Left AROM Left PROM  Right AROM Right PROM   Hip Flexion WFL  WFL    Hip Extension 10  10    Hip Abduction       Hip Adduction       Hip Internal Rotation       Hip External Rotation       Knee Flexion WNL  WNL    Knee Extension WFL  WFL    Lumbar Side glide 50% due to tightness, no pain 50% due to tightness, no pain   Lumbar Flexion Finger tips to just below knee level limited by hamstring tightness and reports slight pain right calf at end range   Lumbar Extension 50% with end range tightness, but reports no pain       STRENGTH:  bilateral hip flexion 5/5; knee flex/ext 5/5; hip abduction 5-/5;  hip extension 5/5    NEURAL TENSION:    Left Right   SLR Negative  Positive   SLR with DF Negative  Positive   Femoral Nerve Negative  Positive   Slump     Adelso (Lumbar)     Adelso (Thoracic)     Adelso (Cervical)     Median     Ulnar     Radial        FLEXIBILITY:  very tight bilateral hamstrings;tightness right > left gastroc  PALPATION:  mild tenderness right lumbar paraspinals      Assessment & Plan   CLINICAL IMPRESSIONS  Medical Diagnosis: Acute right-sided low back pain with right-sided sciatica    Treatment Diagnosis: Acute right-sided low back pain with right-sided sciatica   Impression/Assessment: Patient is a 78 year old male with low back and right LE  complaints.  The following significant findings have been identified: Pain, Decreased ROM/flexibility, Decreased joint mobility, Decreased strength, Impaired muscle performance, and Decreased activity tolerance. These impairments interfere with their ability to perform self care tasks, recreational activities, household chores, driving , household  mobility, and community mobility as compared to previous level of function.     Clinical Decision Making (Complexity):  Clinical Presentation: Stable/Uncomplicated  Clinical Presentation Rationale: based on medical and personal factors listed in PT evaluation  Clinical Decision Making (Complexity): Low complexity    PLAN OF CARE  Treatment Interventions:  Interventions: Manual Therapy, Neuromuscular Re-education, Therapeutic Activity, Therapeutic Exercise, Self-Care/Home Management    Long Term Goals     PT Goal 1  Goal Identifier: Sitting  Goal Description: able to sit for 60 minutes 1/10 LBP and no right LE pain  Rationale: to maximize safety and independence with performance of ADLs and functional tasks;to maximize safety and independence within the home;to maximize safety and independence within the community;to maximize safety and independence with transportation;to maximize safety and independence with self cares  Target Date: 09/30/24  PT Goal 2  Goal Identifier: Sleeping  Goal Description: Less than 1 hour without sleep per night ; lay on right or left side  Rationale: to maximize safety and independence with performance of ADLs and functional tasks;to maximize safety and independence with self cares;to maximize safety and independence within the home  Target Date: 09/30/24      Frequency of Treatment: 1x/week  Duration of Treatment: 8 weeks      Education Assessment:   Learner/Method: Patient;Pictures/Video;No Barriers to Learning  Education Comments: Educated pt on findings of the evaluation and reasoning for plan of care.  Pt was able to understand how treatment plan aligns with goals.    Risks and benefits of evaluation/treatment have been explained.   Patient/Family/caregiver agrees with Plan of Care.     Evaluation Time:     PT Eval, Low Complexity Minutes (10462): 20       Signing Clinician: Milagros Esteves PT        [facility]                                                                                    OUTPATIENT PHYSICAL THERAPY      PLAN OF TREATMENT FOR OUTPATIENT REHABILITATION   Patient's Last Name, First Name, Lauri Morris YOB: 1945   Provider's Name   [facility]   Medical Record No.  [id number]     Onset Date: 07/30/24 (md referral date)  Start of Care Date: 08/05/24     Medical Diagnosis:  Acute right-sided low back pain with right-sided sciatica      PT Treatment Diagnosis:  Acute right-sided low back pain with right-sided sciatica Plan of Treatment  Frequency/Duration: 1x/week/ 8 weeks    Certification date from 08/05/24 to 09/30/24         See note for plan of treatment details and functional goals     Milagros Esteves, PT                         I CERTIFY THE NEED FOR THESE SERVICES FURNISHED UNDER        THIS PLAN OF TREATMENT AND WHILE UNDER MY CARE     (Physician attestation of this document indicates review and certification of the therapy plan).              Referring Provider:  Yenni Sewell    Initial Assessment  See Epic Evaluation- Start of Care Date: 08/05/24

## 2024-08-09 ENCOUNTER — HOSPITAL ENCOUNTER (OUTPATIENT)
Dept: ULTRASOUND IMAGING | Facility: CLINIC | Age: 79
Discharge: HOME | End: 2024-08-09
Attending: INTERNAL MEDICINE | Admitting: INTERNAL MEDICINE
Payer: COMMERCIAL

## 2024-08-09 DIAGNOSIS — R94.4 DECREASED GFR: ICD-10-CM

## 2024-08-09 DIAGNOSIS — R10.31 RIGHT LOWER QUADRANT PAIN: ICD-10-CM

## 2024-08-09 DIAGNOSIS — K68.2 RETROPERITONEAL FIBROSIS: ICD-10-CM

## 2024-08-09 PROCEDURE — 93975 VASCULAR STUDY: CPT

## 2024-08-13 ENCOUNTER — THERAPY VISIT (OUTPATIENT)
Dept: PHYSICAL THERAPY | Facility: CLINIC | Age: 79
End: 2024-08-13
Payer: COMMERCIAL

## 2024-08-13 DIAGNOSIS — M54.41 ACUTE RIGHT-SIDED LOW BACK PAIN WITH RIGHT-SIDED SCIATICA: Primary | ICD-10-CM

## 2024-08-13 PROCEDURE — 97110 THERAPEUTIC EXERCISES: CPT | Mod: GP | Performed by: PHYSICAL THERAPIST

## 2024-08-20 ENCOUNTER — PATIENT OUTREACH (OUTPATIENT)
Dept: CARE COORDINATION | Facility: CLINIC | Age: 79
End: 2024-08-20
Payer: COMMERCIAL

## 2024-08-23 ENCOUNTER — THERAPY VISIT (OUTPATIENT)
Dept: PHYSICAL THERAPY | Facility: CLINIC | Age: 79
End: 2024-08-23
Payer: COMMERCIAL

## 2024-08-23 DIAGNOSIS — M54.41 ACUTE RIGHT-SIDED LOW BACK PAIN WITH RIGHT-SIDED SCIATICA: Primary | ICD-10-CM

## 2024-08-23 PROCEDURE — 97110 THERAPEUTIC EXERCISES: CPT | Mod: GP | Performed by: PHYSICAL THERAPIST

## 2024-08-24 DIAGNOSIS — J44.9 STAGE 3 SEVERE COPD BY GOLD CLASSIFICATION (H): ICD-10-CM

## 2024-08-27 ENCOUNTER — TRANSFERRED RECORDS (OUTPATIENT)
Dept: HEALTH INFORMATION MANAGEMENT | Facility: CLINIC | Age: 79
End: 2024-08-27
Payer: COMMERCIAL

## 2024-08-27 LAB
ALT SERPL-CCNC: 20 IU/L (ref 9–46)
AST SERPL-CCNC: 18 U/L (ref 10–40)
CREATININE (EXTERNAL): 1.3 MG/DL (ref 0.6–1.35)

## 2024-08-27 RX ORDER — FLUTICASONE FUROATE, UMECLIDINIUM BROMIDE AND VILANTEROL TRIFENATATE 100; 62.5; 25 UG/1; UG/1; UG/1
1 POWDER RESPIRATORY (INHALATION) DAILY
Qty: 90 EACH | Refills: 11 | Status: SHIPPED | OUTPATIENT
Start: 2024-08-27

## 2024-08-30 ENCOUNTER — THERAPY VISIT (OUTPATIENT)
Dept: PHYSICAL THERAPY | Facility: CLINIC | Age: 79
End: 2024-08-30
Payer: COMMERCIAL

## 2024-08-30 DIAGNOSIS — M54.41 ACUTE RIGHT-SIDED LOW BACK PAIN WITH RIGHT-SIDED SCIATICA: Primary | ICD-10-CM

## 2024-08-30 PROCEDURE — 97110 THERAPEUTIC EXERCISES: CPT | Mod: GP | Performed by: PHYSICAL THERAPIST

## 2024-09-01 ENCOUNTER — HEALTH MAINTENANCE LETTER (OUTPATIENT)
Age: 79
End: 2024-09-01

## 2024-09-13 ENCOUNTER — THERAPY VISIT (OUTPATIENT)
Dept: PHYSICAL THERAPY | Facility: CLINIC | Age: 79
End: 2024-09-13
Payer: COMMERCIAL

## 2024-09-13 DIAGNOSIS — M54.41 ACUTE RIGHT-SIDED LOW BACK PAIN WITH RIGHT-SIDED SCIATICA: Primary | ICD-10-CM

## 2024-09-13 PROCEDURE — 97110 THERAPEUTIC EXERCISES: CPT | Mod: GP | Performed by: PHYSICAL THERAPIST

## 2024-09-13 NOTE — PROGRESS NOTES
"   09/13/24 0500   Appointment Info   Signing clinician's name / credentials Milagros Esteves, PT   Total/Authorized Visits E &T 8   Visits Used 5   Medical Diagnosis Acute right-sided low back pain with right-sided sciatica   PT Tx Diagnosis Acute right-sided low back pain with right-sided sciatica   Progress Note/Certification   Start of Care Date 08/05/24   Onset of illness/injury or Date of Surgery 07/30/24  (md referral date)   Therapy Frequency 1x/week   Predicted Duration 8 weeks   Certification date from 08/05/24   Certification date to 09/30/24   Progress Note Due Date 09/30/24   Progress Note Completed Date 08/05/24   GOALS   PT Goals 2   PT Goal 1   Goal Identifier Sitting   Goal Description able to sit for 60 minutes 1/10 LBP and no right LE pain   Rationale to maximize safety and independence with performance of ADLs and functional tasks;to maximize safety and independence within the home;to maximize safety and independence within the community;to maximize safety and independence with transportation;to maximize safety and independence with self cares   Goal Progress about the same   Target Date 09/30/24   PT Goal 2   Goal Identifier Sleeping   Goal Description Less than 1 hour without sleep per night ; lay on right or left side   Rationale to maximize safety and independence with performance of ADLs and functional tasks;to maximize safety and independence with self cares;to maximize safety and independence within the home   Goal Progress about the same   Target Date 09/30/24   Subjective Report   Subjective Report States overall about the same since starting therapy. Right calf soreness still persists. When has the \"sore days\" then pain is more severe and located in back, hip, calf, seems to be \"all over\". Some days of baseline pain more localized to back and not hip. Right calf pain is constant and varies in intensity. States exercises do feel good to do to loosen up, but overall not changing the pain. "   Objective Measures   Objective Measures Objective Measure 1;Objective Measure 2   Objective Measure 1   Objective Measure SLR   Details SLR right (+)for right LE pain;slump also (+). major hamstring tightness also limits motion. trial of hamstring stretch irritated right LE symptoms.   Objective Measure 2   Objective Measure Lumbar AROM   Details finger tips to knee level, limited by hamstring tightness; Lumbar extension 25% with tightness   Treatment Interventions (PT)   Interventions Therapeutic Procedure/Exercise;Neuromuscular Re-education   Therapeutic Procedure/Exercise   Therapeutic Procedures: strength, endurance, ROM, flexibility minutes (29005) 30   PTRx Ther Proc 1 Single Knee to Chest   PTRx Ther Proc 1 - Details x 5 right and left   PTRx Ther Proc 2 Supine Lumbar Hip Roll   PTRx Ther Proc 2 - Details x 5 right and left; no pain   PTRx Ther Proc 3 Bridging #1   PTRx Ther Proc 3 - Details x 10 with good form no increase in pain   PTRx Ther Proc 4 Standing Gastroc Stretch   PTRx Ther Proc 4 - Details right and left x 20-30 sec hold x 2   PTRx Ther Proc 5 Standing Extension   PTRx Ther Proc 5 - Details small range x 5   PTRx Ther Proc 6 Standing Hip Abduction   PTRx Ther Proc 6 - Details x10  right and left  cueing for keeping spine in neutral   PTRx Ther Proc 7 Nerve Mobility Sciatic Position 3   PTRx Ther Proc 7 - Details no change with this discontinued from HEP   PTRx Ther Proc 8 Supine Heel Slides   PTRx Ther Proc 8 - Details x 5 x 2   Skilled Intervention Verbal cues and demonstration used for proper performance of exercise including cues for proper form to decrease substitutions and isolate muscle being targeted; cues for slow and controlled motion both concentrically and eccentrically; cueing for good starting position and posture, and to ensure safe performance of motion   Neuromuscular Re-education   Neuromuscular re-ed of mvmt, balance, coord, kinesthetic sense, posture, proprioception minutes  (10534) 3   PTRx Neuro Re-ed 1 Shoulder Theraband Rows   PTRx Neuro Re-ed 1 - Details green x 10 x 2    Education   Learner/Method Patient;Pictures/Video;No Barriers to Learning   Education Comments prefers handouts for HEP   Plan   Home program see PTRX   Updates to plan of care issued updated handouts; green theraband for HEP; PN completed, pt will follow up with physician due to lack of progress and persistance of symptoms.   Plan for next session no further therapy scheduled at this time due to lack of progress, pt has follow up with physician on 10/2/2024.   Total Session Time   Timed Code Treatment Minutes 33   Total Treatment Time (sum of timed and untimed services) 33         PLAN  Pt will follow up with physician due to lack of progress, no further visits scheduled at this time.     Beginning/End Dates of Progress Note Reporting Period:  08/05/24 to 09/13/2024    Referring Provider:  Yenni Sewell

## 2024-09-24 DIAGNOSIS — M54.41 ACUTE RIGHT-SIDED LOW BACK PAIN WITH RIGHT-SIDED SCIATICA: ICD-10-CM

## 2024-09-24 RX ORDER — GABAPENTIN 100 MG/1
CAPSULE ORAL
Qty: 180 CAPSULE | Refills: 0 | Status: SHIPPED | OUTPATIENT
Start: 2024-09-24

## 2024-09-28 SDOH — HEALTH STABILITY: PHYSICAL HEALTH: ON AVERAGE, HOW MANY MINUTES DO YOU ENGAGE IN EXERCISE AT THIS LEVEL?: 30 MIN

## 2024-09-28 SDOH — HEALTH STABILITY: PHYSICAL HEALTH: ON AVERAGE, HOW MANY DAYS PER WEEK DO YOU ENGAGE IN MODERATE TO STRENUOUS EXERCISE (LIKE A BRISK WALK)?: 3 DAYS

## 2024-09-28 ASSESSMENT — SOCIAL DETERMINANTS OF HEALTH (SDOH): HOW OFTEN DO YOU GET TOGETHER WITH FRIENDS OR RELATIVES?: ONCE A WEEK

## 2024-09-30 ENCOUNTER — LAB (OUTPATIENT)
Dept: LAB | Facility: CLINIC | Age: 79
End: 2024-09-30
Payer: COMMERCIAL

## 2024-09-30 DIAGNOSIS — K68.2 RETROPERITONEAL FIBROSIS: ICD-10-CM

## 2024-09-30 DIAGNOSIS — R94.4 DECREASED GFR: ICD-10-CM

## 2024-09-30 LAB
ANION GAP SERPL CALCULATED.3IONS-SCNC: 11 MMOL/L (ref 7–15)
BUN SERPL-MCNC: 21.6 MG/DL (ref 8–23)
CALCIUM SERPL-MCNC: 9.5 MG/DL (ref 8.8–10.4)
CHLORIDE SERPL-SCNC: 105 MMOL/L (ref 98–107)
CREAT SERPL-MCNC: 1.27 MG/DL (ref 0.67–1.17)
EGFRCR SERPLBLD CKD-EPI 2021: 57 ML/MIN/1.73M2
GLUCOSE SERPL-MCNC: 139 MG/DL (ref 70–99)
HCO3 SERPL-SCNC: 24 MMOL/L (ref 22–29)
POTASSIUM SERPL-SCNC: 4.4 MMOL/L (ref 3.4–5.3)
SODIUM SERPL-SCNC: 140 MMOL/L (ref 135–145)

## 2024-09-30 PROCEDURE — 36415 COLL VENOUS BLD VENIPUNCTURE: CPT

## 2024-09-30 PROCEDURE — 80048 BASIC METABOLIC PNL TOTAL CA: CPT

## 2024-10-02 ENCOUNTER — OFFICE VISIT (OUTPATIENT)
Dept: PEDIATRICS | Facility: CLINIC | Age: 79
End: 2024-10-02
Payer: COMMERCIAL

## 2024-10-02 VITALS
RESPIRATION RATE: 14 BRPM | BODY MASS INDEX: 25.77 KG/M2 | HEART RATE: 66 BPM | HEIGHT: 74 IN | WEIGHT: 200.8 LBS | DIASTOLIC BLOOD PRESSURE: 67 MMHG | OXYGEN SATURATION: 97 % | TEMPERATURE: 97.8 F | SYSTOLIC BLOOD PRESSURE: 118 MMHG

## 2024-10-02 DIAGNOSIS — G89.29 CHRONIC RIGHT-SIDED LOW BACK PAIN WITH RIGHT-SIDED SCIATICA: ICD-10-CM

## 2024-10-02 DIAGNOSIS — M54.41 CHRONIC RIGHT-SIDED LOW BACK PAIN WITH RIGHT-SIDED SCIATICA: ICD-10-CM

## 2024-10-02 DIAGNOSIS — H61.23 BILATERAL IMPACTED CERUMEN: ICD-10-CM

## 2024-10-02 DIAGNOSIS — E11.22 TYPE 2 DIABETES MELLITUS WITH STAGE 1 CHRONIC KIDNEY DISEASE, WITH LONG-TERM CURRENT USE OF INSULIN (H): ICD-10-CM

## 2024-10-02 DIAGNOSIS — Z12.5 SCREENING FOR PROSTATE CANCER: ICD-10-CM

## 2024-10-02 DIAGNOSIS — C34.91 CARCINOMA OF RIGHT LUNG (H): ICD-10-CM

## 2024-10-02 DIAGNOSIS — N18.1 TYPE 2 DIABETES MELLITUS WITH STAGE 1 CHRONIC KIDNEY DISEASE, WITH LONG-TERM CURRENT USE OF INSULIN (H): ICD-10-CM

## 2024-10-02 DIAGNOSIS — K68.2 RETROPERITONEAL FIBROSIS: ICD-10-CM

## 2024-10-02 DIAGNOSIS — Z00.00 ENCOUNTER FOR MEDICARE ANNUAL WELLNESS EXAM: Primary | ICD-10-CM

## 2024-10-02 DIAGNOSIS — Z79.4 TYPE 2 DIABETES MELLITUS WITH STAGE 1 CHRONIC KIDNEY DISEASE, WITH LONG-TERM CURRENT USE OF INSULIN (H): ICD-10-CM

## 2024-10-02 DIAGNOSIS — E78.00 HYPERCHOLESTEROLEMIA: ICD-10-CM

## 2024-10-02 LAB
ALBUMIN SERPL BCG-MCNC: 4 G/DL (ref 3.5–5.2)
ANION GAP SERPL CALCULATED.3IONS-SCNC: 11 MMOL/L (ref 7–15)
BUN SERPL-MCNC: 24.1 MG/DL (ref 8–23)
CALCIUM SERPL-MCNC: 9.5 MG/DL (ref 8.8–10.4)
CHLORIDE SERPL-SCNC: 102 MMOL/L (ref 98–107)
CHOLEST SERPL-MCNC: 109 MG/DL
CREAT SERPL-MCNC: 1.26 MG/DL (ref 0.67–1.17)
EGFRCR SERPLBLD CKD-EPI 2021: 57 ML/MIN/1.73M2
EST. AVERAGE GLUCOSE BLD GHB EST-MCNC: 157 MG/DL
FASTING STATUS PATIENT QL REPORTED: NO
GLUCOSE SERPL-MCNC: 189 MG/DL (ref 70–99)
HBA1C MFR BLD: 7.1 % (ref 0–5.6)
HCO3 SERPL-SCNC: 22 MMOL/L (ref 22–29)
HDLC SERPL-MCNC: 40 MG/DL
LDLC SERPL CALC-MCNC: 52 MG/DL
NONHDLC SERPL-MCNC: 69 MG/DL
PHOSPHATE SERPL-MCNC: 3.1 MG/DL (ref 2.5–4.5)
POTASSIUM SERPL-SCNC: 4.1 MMOL/L (ref 3.4–5.3)
PSA SERPL DL<=0.01 NG/ML-MCNC: 0.46 NG/ML
SODIUM SERPL-SCNC: 135 MMOL/L (ref 135–145)
TRIGL SERPL-MCNC: 85 MG/DL

## 2024-10-02 PROCEDURE — 36415 COLL VENOUS BLD VENIPUNCTURE: CPT | Performed by: INTERNAL MEDICINE

## 2024-10-02 PROCEDURE — 83036 HEMOGLOBIN GLYCOSYLATED A1C: CPT | Performed by: INTERNAL MEDICINE

## 2024-10-02 PROCEDURE — G0439 PPPS, SUBSEQ VISIT: HCPCS | Performed by: INTERNAL MEDICINE

## 2024-10-02 PROCEDURE — 99214 OFFICE O/P EST MOD 30 MIN: CPT | Mod: 25 | Performed by: INTERNAL MEDICINE

## 2024-10-02 PROCEDURE — 80061 LIPID PANEL: CPT | Performed by: INTERNAL MEDICINE

## 2024-10-02 PROCEDURE — 80069 RENAL FUNCTION PANEL: CPT | Performed by: INTERNAL MEDICINE

## 2024-10-02 PROCEDURE — G0103 PSA SCREENING: HCPCS | Performed by: INTERNAL MEDICINE

## 2024-10-02 ASSESSMENT — PAIN SCALES - GENERAL: PAINLEVEL: MILD PAIN (3)

## 2024-10-02 NOTE — PROGRESS NOTES
Preventive Care Visit  [facility] MADDIE Sewell MD, Internal Medicine  Oct 2, 2024      Assessment & Plan     Encounter for Medicare annual wellness exam      Chronic right-sided low back pain with right-sided sciatica  After 8 weeks of PT, still having difficulty with discomfort. Plan MRI and follow up with spine. In meanwhile, can try topicals. See patient instructions section.  - MR Lumbar Spine w/o & w Contrast; Future  - Spine  Referral; Future    Retroperitoneal fibrosis  Continues to follow with rheumatology.  - Renal panel (Alb, BUN, Ca, Cl, CO2, Creat, Gluc, Phos, K, Na); Future  - Renal panel (Alb, BUN, Ca, Cl, CO2, Creat, Gluc, Phos, K, Na)    Carcinoma of right lung (H)  Follows with [facility].     Type 2 diabetes mellitus with stage 1 chronic kidney disease, with long-term current use of insulin (H)  A1C at 7.1  discussed option to add Ozempic if still elevated.  - Adult Diabetes Education  Referral; Future  - Hemoglobin A1c; Future  - Hemoglobin A1c    Hypercholesterolemia    - Lipid panel reflex to direct LDL Fasting; Future  - Lipid panel reflex to direct LDL Fasting    Screening for prostate cancer    - PSA, screen; Future  - PSA, screen        Counseling  Appropriate preventive services were addressed with this patient via screening, questionnaire, or discussion as appropriate for fall prevention, nutrition, physical activity, Tobacco-use cessation, social engagement, weight loss and cognition.  Checklist reviewing preventive services available has been given to the patient.      See Patient Instructions    Jeanmarie Venegas is a 79 year old, presenting for the following:  Medicare Visit        10/2/2024     9:14 AM   Additional Questions   Roomed by KM   Accompanied by Self         10/2/2024     9:14 AM   Patient Reported Additional Medications   Patient reports taking the following new medications None       Health Care Directive  Patient has a Health  Care Directive on file  Advance care planning document is on file and is current.    LYNN    Still has R sided low back pain, but a lot better. Has some bad days, but some days he feels close to his normal. Not still improving.       Diabetes Follow-up    How often are you checking your blood sugar? Two times daily  Blood sugar testing frequency justification:   not at goal  What time of day are you checking your blood sugars (select all that apply)?  Before meals and After meals  Have you had any blood sugars above 200?  No  Have you had any blood sugars below 70?  No  What symptoms do you notice when your blood sugar is low?  None  What concerns do you have today about your diabetes? None   Do you have any of these symptoms? (Select all that apply)  No numbness or tingling in feet.  No redness, sores or blisters on feet.  No complaints of excessive thirst.  No reports of blurry vision.  No significant changes to weight.      BP Readings from Last 2 Encounters:   10/02/24 118/67   07/30/24 106/54     Hemoglobin A1C (%)   Date Value   10/02/2024 7.1 (H)   07/30/2024 7.5 (H)   04/26/2021 6.0 (H)   09/22/2020 5.7 (H)     LDL Cholesterol Calculated (mg/dL)   Date Value   10/02/2024 52   01/22/2024 48   09/22/2020 44   07/17/2019 54             Hyperlipidemia Follow-Up    Are you regularly taking any medication or supplement to lower your cholesterol?   Yes- lipitor  Are you having muscle aches or other side effects that you think could be caused by your cholesterol lowering medication?  No    Hypertension Follow-up    Do you check your blood pressure regularly outside of the clinic? Yes   Are you following a low salt diet? Yes  Are your blood pressures ever more than 140 on the top number (systolic) OR more   than 90 on the bottom number (diastolic), for example 140/90? No        9/28/2024   General Health   How would you rate your overall physical health? (!) FAIR   Feel stress (tense, anxious, or unable to sleep) Only  a little      (!) STRESS CONCERN      9/28/2024   Nutrition   Diet: Diabetic            9/28/2024   Exercise   Days per week of moderate/strenous exercise 3 days   Average minutes spent exercising at this level 30 min            9/28/2024   Social Factors   Frequency of gathering with friends or relatives Once a week   Worry food won't last until get money to buy more No   Food not last or not have enough money for food? No   Do you have housing? (Housing is defined as stable permanent housing and does not include staying ouside in a car, in a tent, in an abandoned building, in an overnight shelter, or couch-surfing.) Yes   Are you worried about losing your housing? No   Lack of transportation? No   Unable to get utilities (heat,electricity)? No            9/28/2024   Fall Risk   Fallen 2 or more times in the past year? No   Trouble with walking or balance? No             9/28/2024   Activities of Daily Living- Home Safety   Needs help with the following daily activites None of the above   Safety concerns in the home No grab bars in the bathroom            9/28/2024   Dental   Dentist two times every year? Yes            9/28/2024   Hearing Screening   Hearing concerns? (!) IT'S HARD TO FOLLOW A CONVERSATION IN A NOISY RESTAURANT OR CROWDED ROOM.            9/28/2024   Driving Risk Screening   Patient/family members have concerns about driving No            9/28/2024   General Alertness/Fatigue Screening   Have you been more tired than usual lately? No            9/28/2024   Urinary Incontinence Screening   Bothered by leaking urine in past 6 months No            9/28/2024   TB Screening   Were you born outside of the [place]? No            Today's PHQ-2 Score:       10/1/2024    11:15 AM   PHQ-2 ( 1999 [employer])   Q1: Little interest or pleasure in doing things 0   Q2: Feeling down, depressed or hopeless 0   PHQ-2 Score 0   Q1: Little interest or pleasure in doing things Not at all   Q2: Feeling down, depressed or  hopeless Not at all   PHQ-2 Score 0           2024   Substance Use   Alcohol more than 3/day or more than 7/wk No   Do you have a current opioid prescription? No   How severe/bad is pain from 1 to 10? 2/10   Do you use any other substances recreationally? No        Social History     Tobacco Use    Smoking status: Former     Current packs/day: 0.00     Average packs/day: 0.5 packs/day for 47.6 years (23.8 ttl pk-yrs)     Types: Cigarettes, Cigars, Pipe     Start date: 3/1/1966     Quit date: 10/1/2013     Years since quittin.0    Smokeless tobacco: Never    Tobacco comments:     occ   Vaping Use    Vaping status: Never Used   Substance Use Topics    Alcohol use: Yes     Comment: None since 2006    Drug use: No           Reviewed and updated as needed this visit by Provider                    Labs reviewed in EPIC  Current providers sharing in care for this patient include:  Patient Care Team:  Yenni Sewell MD as PCP - General  Yenni Sewell MD as Assigned PCP  Elva Nguyễn RD as Diabetes Educator (Dietitian, Registered)  Jose D Campoverde MD as MD (Internal Medicine)  Jerome Rasheed MD as MD (Urology)  Maci Reynolds as Pulmonologist (Physician Assistant)  Abby Guerra (Dermatology)  David Valenzuela Grady, MD as Physician (Pulmonary Disease)  Bushra Rm EP as Cardiac Rehabilitation Therapist  Mariza Bernard MD as Resident (Nephrology)    The following health maintenance items are reviewed in Epic and correct as of today:  Health Maintenance   Topic Date Due    MEDICARE ANNUAL WELLNESS VISIT  2024    COVID-19 Vaccine ( season) 10/31/2024    COLORECTAL CANCER SCREENING  2024    EYE EXAM  2024    LIPID  2025    PSA  2025    A1C  2025    CT CHEST W/O CONTRAST  2025    MICROALBUMIN  2025    DIABETIC FOOT EXAM  2025    ANNUAL REVIEW OF HM ORDERS  2025    BMP  2025     "FALL RISK ASSESSMENT  10/02/2025    ADVANCE CARE PLANNING  07/31/2028    DTAP/TDAP/TD IMMUNIZATION (2 - Td or Tdap) 09/05/2034    SPIROMETRY  Completed    COPD ACTION PLAN  Completed    PHQ-2 (once per calendar year)  Completed    INFLUENZA VACCINE  Completed    Pneumococcal Vaccine: 65+ Years  Completed    URINALYSIS  Completed    ZOSTER IMMUNIZATION  Completed    RSV VACCINE  Completed    HPV IMMUNIZATION  Aged Out    MENINGITIS IMMUNIZATION  Aged Out    RSV MONOCLONAL ANTIBODY  Aged Out    LUNG CANCER SCREENING  Discontinued         Review of Systems  Constitutional, HEENT, cardiovascular, pulmonary, gi and gu systems are negative, except as otherwise noted.     Objective    Exam  /67 (BP Location: Right arm, Patient Position: Sitting, Cuff Size: Adult Large)   Pulse 66   Temp 97.8  F (36.6  C) (Tympanic)   Resp 14   Ht 1.88 m (6' 2\")   Wt 91.1 kg (200 lb 12.8 oz)   SpO2 97%   BMI 25.78 kg/m     Estimated body mass index is 25.78 kg/m  as calculated from the following:    Height as of this encounter: 1.88 m (6' 2\").    Weight as of this encounter: 91.1 kg (200 lb 12.8 oz).    Physical Exam  GENERAL: alert and no distress  EYES: Eyes grossly normal to inspection, PERRL and conjunctivae and sclerae normal  HENT: ear canals bilaterally with significant cerumen. Not impacted, nose and mouth without ulcers or lesions  NECK: no adenopathy, no asymmetry, masses, or scars  RESP: lungs clear to auscultation - no rales, rhonchi or wheezes  CV: regular rate and rhythm, normal S1 S2, no S3 or S4, no murmur, click or rub  ABDOMEN: soft, nontender, no hepatosplenomegaly, no masses and bowel sounds normal  MS: no gross musculoskeletal defects noted, no edema  SKIN: no suspicious lesions or rashes  NEURO: Normal strength and tone, mentation intact and speech normal  PSYCH: mentation appears normal, affect normal/bright        10/2/2024   Mini Cog   Clock Draw Score 2 Normal   3 Item Recall 3 objects recalled   Mini " Cog Total Score 5                 Signed Electronically by: Yenni Sewell MD

## 2024-10-02 NOTE — PATIENT INSTRUCTIONS
Lidocaine patches (on for 12 hours and must be off for 12 hours), icy hot, bio freeze topical products could be helpful.     Remember you can take gabapentin during the day if needed for back pain.    [facility] radiology will call you to schedule your MRI. Please call them at [phone] if you have not heard from then in the next 1-2 days.    The orthopedic  service will be calling you to set up an appointment. If you have not heard from them within 1-2 days, please call them at [phone]. Schedule MRI first.     Recheck A1C in 1-2 months. If 7.5 or higher, we should consider adding Ozempic. If it's coming down, let's watch for a bit longer.    Set up appointment with diabetes education    Dr. Abby Guerra at [facility] in [place] ([phone]). Call for an appointment.         Patient Education   Preventive Care Advice   This is general advice given by our system to help you stay healthy. However, your care team may have specific advice just for you. Please talk to your care team about your preventive care needs.  Nutrition  Eat 5 or more servings of fruits and vegetables each day.  Try wheat bread, brown rice and whole grain pasta (instead of white bread, rice, and pasta).  Get enough calcium and vitamin D. Check the label on foods and aim for 100% of the RDA (recommended daily allowance).  Lifestyle  Exercise at least 150 minutes each week  (30 minutes a day, 5 days a week).  Do muscle strengthening activities 2 days a week. These help control your weight and prevent disease.  No smoking.  Wear sunscreen to prevent skin cancer.  Have a dental exam and cleaning every 6 months.  Yearly exams  See your health care team every year to talk about:  Any changes in your health.  Any medicines your care team has prescribed.  Preventive care, family planning, and ways to prevent chronic diseases.  Shots (vaccines)   HPV shots (up to age 26), if you've never had them before.  Hepatitis B shots (up  to age 59), if you've never had them before.  COVID-19 shot: Get this shot when it's due.  Flu shot: Get a flu shot every year.  Tetanus shot: Get a tetanus shot every 10 years.  Pneumococcal, hepatitis A, and RSV shots: Ask your care team if you need these based on your risk.  Shingles shot (for age 50 and up)  General health tests  Diabetes screening:  Starting at age 35, Get screened for diabetes at least every 3 years.  If you are younger than age 35, ask your care team if you should be screened for diabetes.  Cholesterol test: At age 39, start having a cholesterol test every 5 years, or more often if advised.  Bone density scan (DEXA): At age 50, ask your care team if you should have this scan for osteoporosis (brittle bones).  Hepatitis C: Get tested at least once in your life.  STIs (sexually transmitted infections)  Before age 24: Ask your care team if you should be screened for STIs.  After age 24: Get screened for STIs if you're at risk. You are at risk for STIs (including HIV) if:  You are sexually active with more than one person.  You don't use condoms every time.  You or a partner was diagnosed with a sexually transmitted infection.  If you are at risk for HIV, ask about PrEP medicine to prevent HIV.  Get tested for HIV at least once in your life, whether you are at risk for HIV or not.  Cancer screening tests  Cervical cancer screening: If you have a cervix, begin getting regular cervical cancer screening tests starting at age 21.  Breast cancer scan (mammogram): If you've ever had breasts, begin having regular mammograms starting at age 40. This is a scan to check for breast cancer.  Colon cancer screening: It is important to start screening for colon cancer at age 45.  Have a colonoscopy test every 10 years (or more often if you're at risk) Or, ask your provider about stool tests like a FIT test every year or Cologuard test every 3 years.  To learn more about your testing options, visit:   .  For  help making a decision, visit:   https://bit.ly/hg93418.  Prostate cancer screening test: If you have a prostate, ask your care team if a prostate cancer screening test (PSA) at age 55 is right for you.  Lung cancer screening: If you are a current or former smoker ages 50 to 80, ask your care team if ongoing lung cancer screenings are right for you.  For informational purposes only. Not to replace the advice of your health care provider. Copyright   2023 [employer]. All rights reserved. Clinically reviewed by the  [employer]. [employer] - [place] 482478 - REV 01/24.  Learning About Activities of Daily Living  What are activities of daily living?     Activities of daily living (ADLs) are the basic self-care tasks you do every day. These include eating, bathing, dressing, and moving around.  As you age, and if you have health problems, you may find that it's harder to do some of these tasks. If so, your doctor can suggest ideas that may help.  To measure what kind of help you may need, your doctor will ask how well you are able to do ADLs. Let your doctor know if there are any tasks that you are having trouble doing. This is an important first step to getting help. And when you have the help you need, you can stay as independent as possible.  How will a doctor assess your ADLs?  Asking about ADLs is part of a routine health checkup your doctor will likely do as you age. Your health check might be done in a doctor's office, in your home, or at a hospital. The goal is to find out if you are having any problems that could make it hard to care for yourself or that make it unsafe for you to be on your own.  To measure your ADLs, your doctor will ask how hard it is for you to do routine tasks. Your doctor may also want to know if you have changed the way you do a task because of a health problem. Your doctor may watch how you:  Walk back and forth.  Keep your balance while you stand or  walk.  Move from sitting to standing or from a bed to a chair.  Button or unbutton a shirt or sweater.  Remove and put on your shoes.  It's common to feel a little worried or anxious if you find you can't do all the things you used to be able to do. Talking with your doctor about ADLs is a way to make sure you're as safe as possible and able to care for yourself as well as you can. You may want to bring a caregiver, friend, or family member to your checkup. They can help you talk to your doctor.  Follow-up care is a key part of your treatment and safety. Be sure to make and go to all appointments, and call your doctor if you are having problems. It's also a good idea to know your test results and keep a list of the medicines you take.  Current as of: October 24, 2023  Content Version: 14.2    2024 [employer].   Care instructions adapted under license by your healthcare professional. If you have questions about a medical condition or this instruction, always ask your healthcare professional. [employer] disclaims any warranty or liability for your use of this information.    Hearing Loss: Care Instructions  Overview     Hearing loss is a sudden or slow decrease in how well you hear. It can range from slight to profound. Permanent hearing loss can occur with aging. It also can happen when you are exposed long-term to loud noise. Examples include listening to loud music, riding motorcycles, or being around other loud machines.  Hearing loss can affect your work and home life. It can make you feel lonely or depressed. You may feel that you have lost your independence. But hearing aids and other devices can help you hear better and feel connected to others.  Follow-up care is a key part of your treatment and safety. Be sure to make and go to all appointments, and call your doctor if you are having problems. It's also a good idea to know your test results and keep a list of the medicines you  take.  How can you care for yourself at home?  Avoid loud noises whenever possible. This helps keep your hearing from getting worse.  Always wear hearing protection around loud noises.  Wear a hearing aid as directed.  A professional can help you pick a hearing aid that will work best for you.  You can also get hearing aids over the counter for mild to moderate hearing loss.  Have hearing tests as your doctor suggests. They can show whether your hearing has changed. Your hearing aid may need to be adjusted.  Use other devices as needed. These may include:  Telephone amplifiers and hearing aids that can connect to a television, stereo, radio, or microphone.  Devices that use lights or vibrations. These alert you to the doorbell, a ringing telephone, or a baby monitor.  Television closed-captioning. This shows the words at the bottom of the screen. Most new TVs can do this.  TTY (text telephone). This lets you type messages back and forth on the telephone instead of talking or listening. These devices are also called TDD. When messages are typed on the keyboard, they are sent over the phone line to a receiving TTY. The message is shown on a monitor.  Use text messaging, social media, and email if it is hard for you to communicate by telephone.  Try to learn a listening technique called speechreading. It is not lipreading. You pay attention to people's gestures, expressions, posture, and tone of voice. These clues can help you understand what a person is saying. Face the person you are talking to, and have them face you. Make sure the lighting is good. You need to see the other person's face clearly.  Think about counseling if you need help to adjust to your hearing loss.  When should you call for help?  Watch closely for changes in your health, and be sure to contact your doctor if:    You think your hearing is getting worse.     You have new symptoms, such as dizziness or nausea.   Where can you learn more?  Go to  "[email or web address].net/patiented  Enter R798 in the search box to learn more about \"Hearing Loss: Care Instructions.\"  Current as of: September 27, 2023               Content Version: 14.0    0542-6255 [employer].   Care instructions adapted under license by your healthcare professional. If you have questions about a medical condition or this instruction, always ask your healthcare professional. [employer] disclaims any warranty or liability for your use of this information.         "

## 2024-10-03 ENCOUNTER — PATIENT OUTREACH (OUTPATIENT)
Dept: CARE COORDINATION | Facility: CLINIC | Age: 79
End: 2024-10-03
Payer: COMMERCIAL

## 2024-10-11 ENCOUNTER — VIRTUAL VISIT (OUTPATIENT)
Dept: EDUCATION SERVICES | Facility: CLINIC | Age: 79
End: 2024-10-11
Attending: INTERNAL MEDICINE
Payer: COMMERCIAL

## 2024-10-11 DIAGNOSIS — N18.1 TYPE 2 DIABETES MELLITUS WITH STAGE 1 CHRONIC KIDNEY DISEASE, WITH LONG-TERM CURRENT USE OF INSULIN (H): ICD-10-CM

## 2024-10-11 DIAGNOSIS — E11.22 TYPE 2 DIABETES MELLITUS WITH STAGE 1 CHRONIC KIDNEY DISEASE, WITH LONG-TERM CURRENT USE OF INSULIN (H): ICD-10-CM

## 2024-10-11 DIAGNOSIS — Z79.4 TYPE 2 DIABETES MELLITUS WITH STAGE 1 CHRONIC KIDNEY DISEASE, WITH LONG-TERM CURRENT USE OF INSULIN (H): ICD-10-CM

## 2024-10-11 PROCEDURE — G0108 DIAB MANAGE TRN  PER INDIV: HCPCS | Mod: 93 | Performed by: NUTRITIONIST

## 2024-10-11 NOTE — PROGRESS NOTES
Diabetes Self-Management Education & Support    Presents for: Individual review    Type of Service: Telephone Visit    Originating Location (Patient Location): Home  Distant Location (Provider Location): Offsite  Mode of Communication:  Telephone    Telephone Visit Start Time:  10:30 AM  Telephone Visit End Time (telephone visit stop time): 11:19 AM    How would patient like to obtain AVS? Scott      ASSESSMENT:  Theo reports previous diabetes education, states concern over A1c levels >7.0%, last A1c done early October 7.1%.  He monitors twice daily fasting blood sugar and either pre or post meals.  7 day average = 128 mg/dL, 14 day average =142 mg/dL and 30 day average =137 mg/dL.  He is active with daily physical therapy routine and walking and one day a weekly curling.  Review of diet shows appropriate portions at breakfast and lunch, dinner may be a little large.  He was instruted to monitor 2 hours after dinner a few days/week to see if he's <180 mg/dL.        Patient's most recent   Lab Results   Component Value Date    A1C 7.1 10/02/2024    A1C 6.0 04/26/2021     is meeting goal of <7.0    Diabetes knowledge and skills assessment:   Patient is knowledgeable in diabetes management concepts related to: Being Active, Monitoring, Taking Medication, and Healthy Coping    Continue education with the following diabetes management concepts: Healthy Eating, Problem Solving, and Reducing Risks    Based on learning assessment above, most appropriate setting for further diabetes education would be: Individual setting.      PLAN    1) Monitor a few days/week 2 hours after dinner meal  2) Aim for 60 grams carbohydrate (4 carbohydrate choices) at each meal  3) Continue to be active daily      Follow-up: 11/22/24    See Care Plan for co-developed, patient-state behavior change goals.  AVS provided for patient today.    Education Materials Provided:  No new materials provided today      SUBJECTIVE/OBJECTIVE:  Presents for:  "Individual review  Accompanied by: Self  Diabetes education in the past 24mo: No  Focus of Visit: Patient Unsure  Diabetes type: Type 2  Disease course: Getting harder to manage  How confident are you filling out medical forms by yourself:: Quite a bit  Difficulty affording diabetes medication?: No  Difficulty affording diabetes testing supplies?: No  Other concerns:: None  Cultural Influences/Ethnic Background:  Not  or       Diabetes Symptoms & Complications:  Diabetes Related Symptoms: None  Weight trend: Stable  Symptom course: Stable  Disease course: Getting harder to manage  Complications assessed today?: Yes  Autonomic neuropathy: No  CVA: No  Heart disease: No  Nephropathy: No  Peripheral neuropathy: No  Peripheral Vascular Disease: No  Retinopathy: No  Sexual dysfunction: No    Patient Problem List and Family Medical History reviewed for relevant medical history, current medical status, and diabetes risk factors.    Vitals:  There were no vitals taken for this visit.  Estimated body mass index is 25.78 kg/m  as calculated from the following:    Height as of 10/2/24: 1.88 m (6' 2\").    Weight as of 10/2/24: 91.1 kg (200 lb 12.8 oz).   Last 3 BP:   BP Readings from Last 3 Encounters:   10/02/24 118/67   07/30/24 106/54   01/29/24 115/65       History   Smoking Status    Former    Types: Cigarettes, Cigars, Pipe   Smokeless Tobacco    Never       Labs:  Lab Results   Component Value Date    A1C 7.1 10/02/2024    A1C 6.0 04/26/2021     Lab Results   Component Value Date     10/02/2024     07/20/2022    GLC 99 09/22/2020     Lab Results   Component Value Date    LDL 52 10/02/2024    LDL 44 09/22/2020     HDL Cholesterol   Date Value Ref Range Status   09/22/2020 49 >39 mg/dL Final     Direct Measure HDL   Date Value Ref Range Status   10/02/2024 40 >=40 mg/dL Final   ]  GFR Estimate   Date Value Ref Range Status   10/02/2024 57 (L) >60 mL/min/1.73m2 Final     Comment:     eGFR " "calculated using 2021 CKD-EPI equation.   03/08/2021 82.7 ml/min/1.73m2 Final     GFR, ESTIMATED POCT   Date Value Ref Range Status   07/14/2021 >60 >60 mL/min/1.73m2 Final     GFR Estimate If Black   Date Value Ref Range Status   02/25/2021 71 >60 mL/min/[1.73_m2] Final     Lab Results   Component Value Date    CR 1.26 10/02/2024    CR 0.920 06/07/2021     No results found for: \"MICROALBUMIN\"    Healthy Eating:  Healthy Eating Assessed Today: Yes  Cultural/Episcopal diet restrictions?: No  Do you have any food allergies or intolerances?: No  Meal planning/habits: None  Who cooks/prepares meals for you?: Self  Who purchases food in  your home?: Self  How many times a week on average do you eat food made away from home (restaurant/take-out)?: 1  Meals include: Breakfast, Lunch, Dinner, Afternoon Snack  Breakfast: cereal (cheerios or nuts&oats) OR malt-o-meal OR oatmeal - 1 cup servings with fruit orange or prunes  Lunch: sandwich with ham/turkey/chicken and cheese, spinach and tomatoes and  carrots/bell peppers/celery  Dinner: hamburger and fries OR burriotos (2) OR chili OR pizza 3 (thin crust) OR rice with porkchop/chicken  Snacks: trailmix (peanuts/almonds/craisins)  Beverages: Water, Tea  Has patient met with a dietitian in the past?: No    Being Active:  Being Active Assessed Today: Yes  Exercise:: Yes (curling for one hour/week and physical therapy)  Days per week of moderate to strenuous exercise (like a brisk walk): 7  On average, minutes per day of exercise at this level: 30  How intense was your typical exercise? : Light (like stretching or slow walking)  Exercise Minutes per Week: 210  Barrier to exercise: Physical limitation    Monitoring:  Monitoring Assessed Today: Yes  Did patient bring glucose meter to appointment? : Yes  Blood Glucose Meter: Accu-chek  Times checking blood sugar at home (number): 2  Times checking blood sugar at home (per): Day  Blood glucose trend: Decreasing    Taking " Medications:  Diabetes Medication(s)       Biguanides       metFORMIN (GLUCOPHAGE XR) 500 MG 24 hr tablet Take 3 tablets (1,500 mg) by mouth daily (with dinner)       Sodium-Glucose Co-Transporter 2 (SGLT2) Inhibitors       empagliflozin (JARDIANCE) 10 MG TABS tablet Take 1 tablet (10 mg) by mouth daily          Taking Medication Assessed Today: Yes  Current Treatments: Diet, Oral Medication (taken by mouth)  Problems taking diabetes medications regularly?: No  Diabetes medication side effects?: No    Problem Solving:  Problem Solving Assessed Today: Yes  Is the patient at risk for hypoglycemia?: No  Is the patient at risk for DKA?: No  Does patient have severe weather/disaster plan for diabetes management?: No  Does patient have sick day plan for diabetes management?: No    Reducing Risks:  Reducing Risks Assessed Today: Yes  Diabetes Risks: Age over 45 years, Hyperlipidemia  CAD Risks: Diabetes Mellitus, Male sex  Has dilated eye exam at least once a year?: Yes  Sees dentist every 6 months?: Yes  Feet checked by healthcare provider in the last year?: Yes    Healthy Coping:  Healthy Coping Assessed Today: Yes  Emotional response to diabetes: Acceptance  Informal Support system:: None  Stage of change: ACTION (Actively working towards change)  Patient Activation Measure Survey Score:      1/20/2020     8:20 AM 3/8/2023     9:50 AM   MARGARITA Score (Last Two)   MARGARITA Raw Score 38 33   Activation Score 83.7 65.8   MARGARITA Level 4 3         Care Plan and Education Provided:  Healthy Eating: Balanced meals, Carbohydrate Counting, and Portion control, Being Active: Finding a physical activity routine that works for you, and Monitoring: Frequency of monitoring and Individual glucose targets    Chayo Frazier, YAHAIRAN, LDN, [facility]     Time Spent: 49 minutes  Encounter Type: Individual    Any diabetes medication dose changes were made via the CDE Protocol per the patient's referring provider. A copy of this encounter was shared with the  provider.

## 2024-10-11 NOTE — PATIENT INSTRUCTIONS
Jarad Venegas,    Here are some things that we discussed today.      Goals for Diabetes Care:    1. Eat 3 balanced meals each day - Monitor carb intake and aim for 45-60 grams per meal  (3-4 carbohydrate choices) and 15 grams carbohydrate (1 carbohydrate choice) per snack (2/day)     Carbohydrate 1 choice = 15 grams     Aim to eat a balanced plate - half your plate fruits/veggies, 1/4 the plate protein and 1/4 plate starch (rice, potato, pasta)    2. Check blood sugars 2 times each day at varying times   Blood Glucose Targets:   1. Fasting and before meal target is 80 - 130 mg/dl   2. 2 hours after a meal target is < 180 mg/dl  Always remember to bring meter and/or log book to all appointments.    3. Activity really helps improve blood sugars. Try to Incorporate 30 minutes activity into each day - does not need to be all at one time & walking counts!    4. Take diabetes medications as prescribed   -Metformin  mg x 3 at dinner  -Jardiance 10 mg once daily AM    Follow up with your Diabetic Educator to assess BG targets and need for modifications to medications and/or lifestyle.    Call with any questions.  Thank you!  Chayo Frazier, RD, LD, [facility]   Certified Diabetes Care &   [facility]  Triage [phone] or Scheduling [phone]

## 2024-10-11 NOTE — LETTER
10/11/2024         RE: Lauri Mcgee  [address] [place] [address]        Dear Colleague,    Thank you for referring your patient, Lauri Mcgee, to the [facility]. Please see a copy of my visit note below.    Diabetes Self-Management Education & Support    Presents for: Individual review    Type of Service: Telephone Visit    Originating Location (Patient Location): Home  Distant Location (Provider Location): Offsite  Mode of Communication:  Telephone    Telephone Visit Start Time:  10:30 AM  Telephone Visit End Time (telephone visit stop time): 11:19 AM    How would patient like to obtain AVS? MyChart      ASSESSMENT:  Theo reports previous diabetes education, states concern over A1c levels >7.0%, last A1c done early October 7.1%.  He monitors twice daily fasting blood sugar and either pre or post meals.  7 day average = 128 mg/dL, 14 day average =142 mg/dL and 30 day average =137 mg/dL.  He is active with daily physical therapy routine and walking and one day a weekly curling.  Review of diet shows appropriate portions at breakfast and lunch, dinner may be a little large.  He was instruted to monitor 2 hours after dinner a few days/week to see if he's <180 mg/dL.        Patient's most recent   Lab Results   Component Value Date    A1C 7.1 10/02/2024    A1C 6.0 04/26/2021     is meeting goal of <7.0    Diabetes knowledge and skills assessment:   Patient is knowledgeable in diabetes management concepts related to: Being Active, Monitoring, Taking Medication, and Healthy Coping    Continue education with the following diabetes management concepts: Healthy Eating, Problem Solving, and Reducing Risks    Based on learning assessment above, most appropriate setting for further diabetes education would be: Individual setting.      PLAN    1) Monitor a few days/week 2 hours after dinner meal  2) Aim for 60 grams carbohydrate (4 carbohydrate choices) at each meal  3) Continue to be  "active daily      Follow-up: 11/22/24    See Care Plan for co-developed, patient-state behavior change goals.  AVS provided for patient today.    Education Materials Provided:  No new materials provided today      SUBJECTIVE/OBJECTIVE:  Presents for: Individual review  Accompanied by: Self  Diabetes education in the past 24mo: No  Focus of Visit: Patient Unsure  Diabetes type: Type 2  Disease course: Getting harder to manage  How confident are you filling out medical forms by yourself:: Quite a bit  Difficulty affording diabetes medication?: No  Difficulty affording diabetes testing supplies?: No  Other concerns:: None  Cultural Influences/Ethnic Background:  Not  or       Diabetes Symptoms & Complications:  Diabetes Related Symptoms: None  Weight trend: Stable  Symptom course: Stable  Disease course: Getting harder to manage  Complications assessed today?: Yes  Autonomic neuropathy: No  CVA: No  Heart disease: No  Nephropathy: No  Peripheral neuropathy: No  Peripheral Vascular Disease: No  Retinopathy: No  Sexual dysfunction: No    Patient Problem List and Family Medical History reviewed for relevant medical history, current medical status, and diabetes risk factors.    Vitals:  There were no vitals taken for this visit.  Estimated body mass index is 25.78 kg/m  as calculated from the following:    Height as of 10/2/24: 1.88 m (6' 2\").    Weight as of 10/2/24: 91.1 kg (200 lb 12.8 oz).   Last 3 BP:   BP Readings from Last 3 Encounters:   10/02/24 118/67   07/30/24 106/54   01/29/24 115/65       History   Smoking Status     Former     Types: Cigarettes, Cigars, Pipe   Smokeless Tobacco     Never       Labs:  Lab Results   Component Value Date    A1C 7.1 10/02/2024    A1C 6.0 04/26/2021     Lab Results   Component Value Date     10/02/2024     07/20/2022    GLC 99 09/22/2020     Lab Results   Component Value Date    LDL 52 10/02/2024    LDL 44 09/22/2020     HDL Cholesterol   Date Value " "Ref Range Status   09/22/2020 49 >39 mg/dL Final     Direct Measure HDL   Date Value Ref Range Status   10/02/2024 40 >=40 mg/dL Final   ]  GFR Estimate   Date Value Ref Range Status   10/02/2024 57 (L) >60 mL/min/1.73m2 Final     Comment:     eGFR calculated using 2021 CKD-EPI equation.   03/08/2021 82.7 ml/min/1.73m2 Final     GFR, ESTIMATED POCT   Date Value Ref Range Status   07/14/2021 >60 >60 mL/min/1.73m2 Final     GFR Estimate If Black   Date Value Ref Range Status   02/25/2021 71 >60 mL/min/[1.73_m2] Final     Lab Results   Component Value Date    CR 1.26 10/02/2024    CR 0.920 06/07/2021     No results found for: \"MICROALBUMIN\"    Healthy Eating:  Healthy Eating Assessed Today: Yes  Cultural/Latter day diet restrictions?: No  Do you have any food allergies or intolerances?: No  Meal planning/habits: None  Who cooks/prepares meals for you?: Self  Who purchases food in  your home?: Self  How many times a week on average do you eat food made away from home (restaurant/take-out)?: 1  Meals include: Breakfast, Lunch, Dinner, Afternoon Snack  Breakfast: cereal (cheerios or nuts&oats) OR malt-o-meal OR oatmeal - 1 cup servings with fruit orange or prunes  Lunch: sandwich with ham/turkey/chicken and cheese, spinach and tomatoes and  carrots/bell peppers/celery  Dinner: hamburger and fries OR burriotos (2) OR chili OR pizza 3 (thin crust) OR rice with porkchop/chicken  Snacks: trailmix (peanuts/almonds/craisins)  Beverages: Water, Tea  Has patient met with a dietitian in the past?: No    Being Active:  Being Active Assessed Today: Yes  Exercise:: Yes (curling for one hour/week and physical therapy)  Days per week of moderate to strenuous exercise (like a brisk walk): 7  On average, minutes per day of exercise at this level: 30  How intense was your typical exercise? : Light (like stretching or slow walking)  Exercise Minutes per Week: 210  Barrier to exercise: Physical limitation    Monitoring:  Monitoring Assessed " Today: Yes  Did patient bring glucose meter to appointment? : Yes  Blood Glucose Meter: Accu-chek  Times checking blood sugar at home (number): 2  Times checking blood sugar at home (per): Day  Blood glucose trend: Decreasing    Taking Medications:  Diabetes Medication(s)       Biguanides       metFORMIN (GLUCOPHAGE XR) 500 MG 24 hr tablet Take 3 tablets (1,500 mg) by mouth daily (with dinner)       Sodium-Glucose Co-Transporter 2 (SGLT2) Inhibitors       empagliflozin (JARDIANCE) 10 MG TABS tablet Take 1 tablet (10 mg) by mouth daily          Taking Medication Assessed Today: Yes  Current Treatments: Diet, Oral Medication (taken by mouth)  Problems taking diabetes medications regularly?: No  Diabetes medication side effects?: No    Problem Solving:  Problem Solving Assessed Today: Yes  Is the patient at risk for hypoglycemia?: No  Is the patient at risk for DKA?: No  Does patient have severe weather/disaster plan for diabetes management?: No  Does patient have sick day plan for diabetes management?: No    Reducing Risks:  Reducing Risks Assessed Today: Yes  Diabetes Risks: Age over 45 years, Hyperlipidemia  CAD Risks: Diabetes Mellitus, Male sex  Has dilated eye exam at least once a year?: Yes  Sees dentist every 6 months?: Yes  Feet checked by healthcare provider in the last year?: Yes    Healthy Coping:  Healthy Coping Assessed Today: Yes  Emotional response to diabetes: Acceptance  Informal Support system:: None  Stage of change: ACTION (Actively working towards change)  Patient Activation Measure Survey Score:      1/20/2020     8:20 AM 3/8/2023     9:50 AM   MARGARITA Score (Last Two)   MARGARITA Raw Score 38 33   Activation Score 83.7 65.8   MARGARITA Level 4 3         Care Plan and Education Provided:  Healthy Eating: Balanced meals, Carbohydrate Counting, and Portion control, Being Active: Finding a physical activity routine that works for you, and Monitoring: Frequency of monitoring and Individual glucose  targets    Chayo Frazier, YAHAIRAN, LDN, RENATOES     Time Spent: 49 minutes  Encounter Type: Individual    Any diabetes medication dose changes were made via the CDE Protocol per the patient's referring provider. A copy of this encounter was shared with the provider.

## 2024-10-18 PROBLEM — M54.41 ACUTE RIGHT-SIDED LOW BACK PAIN WITH RIGHT-SIDED SCIATICA: Status: RESOLVED | Noted: 2024-08-05 | Resolved: 2024-10-18

## 2024-10-19 ENCOUNTER — HOSPITAL ENCOUNTER (OUTPATIENT)
Dept: MRI IMAGING | Facility: CLINIC | Age: 79
Discharge: HOME | End: 2024-10-19
Attending: INTERNAL MEDICINE | Admitting: INTERNAL MEDICINE
Payer: COMMERCIAL

## 2024-10-19 DIAGNOSIS — M54.41 CHRONIC RIGHT-SIDED LOW BACK PAIN WITH RIGHT-SIDED SCIATICA: ICD-10-CM

## 2024-10-19 DIAGNOSIS — K68.2 RETROPERITONEAL FIBROSIS: ICD-10-CM

## 2024-10-19 DIAGNOSIS — C34.91 CARCINOMA OF RIGHT LUNG (H): ICD-10-CM

## 2024-10-19 DIAGNOSIS — G89.29 CHRONIC RIGHT-SIDED LOW BACK PAIN WITH RIGHT-SIDED SCIATICA: ICD-10-CM

## 2024-10-19 PROCEDURE — 255N000002 HC RX 255 OP 636: Performed by: INTERNAL MEDICINE

## 2024-10-19 PROCEDURE — 72158 MRI LUMBAR SPINE W/O & W/DYE: CPT

## 2024-10-19 PROCEDURE — A9585 GADOBUTROL INJECTION: HCPCS | Performed by: INTERNAL MEDICINE

## 2024-10-19 RX ORDER — GADOBUTROL 604.72 MG/ML
9 INJECTION INTRAVENOUS ONCE
Status: COMPLETED | OUTPATIENT
Start: 2024-10-19 | End: 2024-10-19

## 2024-10-19 RX ADMIN — GADOBUTROL 9 ML: 604.72 INJECTION INTRAVENOUS at 15:59

## 2024-11-03 DIAGNOSIS — M54.41 ACUTE RIGHT-SIDED LOW BACK PAIN WITH RIGHT-SIDED SCIATICA: ICD-10-CM

## 2024-11-04 RX ORDER — GABAPENTIN 100 MG/1
CAPSULE ORAL
Qty: 180 CAPSULE | Refills: 3 | Status: SHIPPED | OUTPATIENT
Start: 2024-11-04

## 2024-11-12 ENCOUNTER — OFFICE VISIT (OUTPATIENT)
Dept: ORTHOPEDICS | Facility: CLINIC | Age: 79
End: 2024-11-12
Attending: INTERNAL MEDICINE
Payer: COMMERCIAL

## 2024-11-12 VITALS
HEART RATE: 65 BPM | OXYGEN SATURATION: 97 % | SYSTOLIC BLOOD PRESSURE: 135 MMHG | HEIGHT: 75 IN | BODY MASS INDEX: 24.72 KG/M2 | WEIGHT: 198.8 LBS | DIASTOLIC BLOOD PRESSURE: 69 MMHG

## 2024-11-12 DIAGNOSIS — M51.27 LUMBOSACRAL DISC HERNIATION: Primary | ICD-10-CM

## 2024-11-12 DIAGNOSIS — G89.29 CHRONIC RIGHT-SIDED LOW BACK PAIN WITH RIGHT-SIDED SCIATICA: ICD-10-CM

## 2024-11-12 DIAGNOSIS — M54.17 LUMBOSACRAL RADICULOPATHY: ICD-10-CM

## 2024-11-12 DIAGNOSIS — M54.41 CHRONIC RIGHT-SIDED LOW BACK PAIN WITH RIGHT-SIDED SCIATICA: ICD-10-CM

## 2024-11-12 ASSESSMENT — PAIN SCALES - GENERAL: PAINLEVEL_OUTOF10: NO PAIN (1)

## 2024-11-12 NOTE — PATIENT INSTRUCTIONS
For nursing questions, please call [phone].    Please schedule a follow-up appointment as needed.  You can schedule this at the , or you can call [phone].    For medical records, please call [phone].

## 2024-11-12 NOTE — LETTER
11/12/2024      Lauri Mcgee  [address] [place] [address]      Dear Colleague,    Thank you for referring your patient, Lauir Mcgee, to the [facility]. Please see a copy of my visit note below.    PHYSICAL MEDICINE & REHABILITATION / MEDICAL SPINE        Date:  Nov 12, 2024    Name:  Lauri Mcgee  YOB: 1945  MRN:  [id number]          REASON FOR CONSULTATION:  Chronic right-sided low back pain with right-sided sciatica.        REFERRING PROVIDER:  Yenni Sewell MD        CHART REVIEW:  Reviewed 10/02/2024 note from Yenni Sewell MD (internal medicine).  Mr. Lauri Mcgee continued to have right-sided low back pain.  He was still having pain after 8 weeks of physical therapy.  MRI lumbar spine was ordered.  Referral to medical spine was placed.        HISTORY OF PRESENT ILLNESS:  Mr. Lauri Mcgee is an 79-year-old male.  He states in 1972 at the age of 27 he had surgery for a herniated disk with symptoms that radiated to his left lower extremity.  He has noted some numbness in the sole of his left foot and in the toes of his left foot since that time.  Mr. Lauri Mcgee denies any other injuries or surgeries of his mid back, low back, pelvis, hips, thighs, knees, legs, ankles, feet, toes.    Mr. Lauri Mcgee's mother had gout.  Mr. Mcgee denies any other personal or family history of autoimmune diseases, rheumatologic diseases, gout, pseudogout, neurologic diseases, inflammatory bowel diseases.    Mr. Lauri Mcgee began noting pain in his right buttock, right lateral hip, and right groin beginning in mid July 2024.  Mr. Lauri Mcgee has noticed some numbness tingling in the sole of his right foot and the toes of his right foot since mid July 2024.  Mr. Lauri Mcgee notes that his right buttock, right lateral hip, and right groin pain is intermittent.  He notes there is intermittent radiation into his right posterior thigh and right posterior  "leg.    Mr. Lauri Mcgee is feeling well today.  He does not have any pain today.  He states that it has been 2 weeks since he has had any back pain.  He states he has felt pretty well since mid September 2024.  Mr. Lauri Mcgee notes that his right low back/buttock pain with radiation to the right lateral hip, right groin, and right lower extremity is intermittent.  This pain is described as \"spasms, aches.\"  This pain does not occur daily.  This pain seems to be worsened overnight with sleeping and worsens with sitting.  Physical therapy has provided improvement in this pain.  Mr. Lauri Mcgee continues to perform the home exercise program from physical therapy daily.  He also notes that movement helps his pain.    Mr. Lauri Mcgee states that his pain does not change with coughing, sneezing.  Driving and hitting a pothole can worsen his pain.  Mr. Lauri Castellanos pain does not keep him awake, but it has woken him.    Mr. Lauri Mcgee denies any weakness.  He denies any catching, locking, popping.  Mr. Mcgee denies any bruising or redness.  He does note some swelling in his feet and ankles bilaterally.  Mr. Lauri Mcgee notes giveway episodes of his lower extremities once per quarter.  He is tripping/stumbling once per year.  He has fallen 1 time in the last 20 years.  Mr. Mcgee is not using any assistive devices for ambulation.  Mr. Lauri Mcgee denies any other numbness, tingling, pins-and-needles.  He denies any saddle anesthesia.  Mr. Mcgee notes episodes of bowel incontinence and bladder incontinence approximately once per month.  Mr. Lauri Mcgee denies that his bilateral lower extremities become weak and tired with standing or walking.    Mr. Lauri Mcgee has not tried acupuncture, chiropractor treatments, injections, massage.  He continues to perform the home exercise program from physical therapy daily and notes benefit from this.  Mr. Lauri Mcgee is taking gabapentin 300 mg daily.  He " is not taking any other pain medications.        REVIEW OF SYSTEMS:  As detailed in the history of present illness.        ALLERGIES:  Allergies   Allergen Reactions     Smoke.          MEDICATIONS:  Current Outpatient Medications   Medication Sig Dispense Refill     albuterol (PROAIR HFA/PROVENTIL HFA/VENTOLIN HFA) 108 (90 Base) MCG/ACT inhaler Inhale 2 puffs into the lungs every 4 hours as needed for shortness of breath, wheezing or cough 18 g 4     atorvastatin (LIPITOR) 10 MG tablet Take 1 tablet (10 mg) by mouth daily 90 tablet 3     azaTHIOprine (IMURAN) 50 MG tablet Take 50 mg by mouth daily 30 tablet 1     blood glucose (NO BRAND SPECIFIED) test strip Use to test blood sugar 3 times daily or as directed. 300 strip 1     blood glucose monitoring (SOFTCLIX) lancets Use to test blood sugar 2 times daily. 100 each 6     Cholecalciferol (VITAMIN D) 1000 UNITS capsule Take 1 capsule by mouth daily.       empagliflozin (JARDIANCE) 10 MG TABS tablet Take 1 tablet (10 mg) by mouth daily 30 tablet 5     famotidine (PEPCID) 20 MG tablet Take 1 tablet (20 mg) by mouth 2 times daily 180 tablet 4     Fluticasone-Umeclidin-Vilant (TRELEGY ELLIPTA) 100-62.5-25 MCG/ACT oral inhaler INHALE 1 PUFF ONCE DAILY 90 each 11     gabapentin (NEURONTIN) 100 MG capsule TAKE 3 CAPSULES BY MOUTH ONCE DAILY AT BEDTIME THEN 1 TO 2 CAPSULES 2 TIMES DAILY AS NEEDED FOR BACK PAIN 180 capsule 3     lisinopril (ZESTRIL) 2.5 MG tablet Take 1 tablet (2.5 mg) by mouth daily 90 tablet 3     metFORMIN (GLUCOPHAGE XR) 500 MG 24 hr tablet Take 3 tablets (1,500 mg) by mouth daily (with dinner) 270 tablet 11     predniSONE (DELTASONE) 5 MG tablet 2.5 mg           PAST MEDICAL HISTORY:  Past Medical History:   Diagnosis Date     Abnormal CT lung screening 07/25/2019    [facility] [place] [facility]          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         PAST SURGICAL HISTORY:  Past Surgical History:   Procedure Laterality Date     BACK SURGERY  2/1972    Disk surgery     BIOPSY  7/2006    fibrosis     CARDIAC NUC ALEX STRESS TEST NL  7/26/06    normal cardiolyte test     COLONOSCOPY  11/2017     EYE SURGERY  2/10/2014    catraract     GENITOURINARY SURGERY  5/8/2013    Ureteral stent. removed 10/28/2013     TONSILLECTOMY       ZZC NONSPECIFIC PROCEDURE  1971    surgery for herniated disk         FAMILY HISTORY:  Family History   Problem Relation Age of Onset     Cerebrovascular Disease Father         Due to blood clots from leg injury     Heart Disease Father      Coronary Artery Disease Father      Circulatory Mother         anurism     Osteoporosis Mother      Diabetes Type 2  Brother      Diabetes Brother      Colon Cancer Brother      Substance Abuse Brother      Diabetes Type 2  Sister      Diabetes Sister      Diabetes Type 2  Paternal Grandfather      Diabetes Paternal Grandfather      Cerebrovascular Disease Paternal Grandfather      Hypertension Maternal Grandmother      Osteoporosis Maternal Grandmother      Coronary Artery Disease Maternal Grandfather      Diabetes Brother      Diabetes Sister      Colon Cancer Brother      Other Cancer Brother         Kidney     Asthma Brother          SOCIAL HISTORY:  Social History     Socioeconomic History     Marital status: Single     Spouse name: Not on file     Number of children: Not on file     Years of education: Not on file     Highest education level: Bachelor's degree (e.g., BA, AB, BS)   Occupational History     Occupation:      Employer: [employer]     Comment:  [employer]   Tobacco Use     Smoking status: Former     Current packs/day: 0.00     Average packs/day: 0.5 packs/day for 47.6 years (23.8 ttl pk-yrs)     Types: Cigarettes, Cigars, Pipe     Start date: 3/1/1966     Quit date: 10/1/2013     Years since quittin.1     Smokeless tobacco: Never     Tobacco comments:     occ   Vaping Use     Vaping status: Never Used   Substance and Sexual Activity     Alcohol use: Yes     Comment: None since 2006     Drug use: No     Sexual activity: Never   Other Topics Concern     Parent/sibling w/ CABG, MI or angioplasty before 65F 55M? Yes     Comment: Father; due to blood clots from leg injury   Social History Narrative     Not on file     Social Drivers of Health     Financial Resource Strain: Low Risk  (2024)    Financial Resource Strain      Within the past 12 months, have you or your family members you live with been unable to get utilities (heat, electricity) when it was really needed?: No   Food Insecurity: Low Risk  (2024)    Food Insecurity      Within the past 12 months, did you worry that your food would run out before you got money to buy more?: No      Within the past 12 months, did the food you bought just not last and you didn t have money to get more?: No   Transportation Needs: Low Risk  (2024)    Transportation Needs      Within the past 12 months, has lack of transportation kept you from medical appointments, getting your medicines, non-medical meetings or appointments, work, or from getting things that you need?: No   Physical Activity: Insufficiently Active (2024)    Exercise Vital Sign      Days of Exercise per Week: 3 days      Minutes of Exercise per Session: 30 min   Stress: No Stress Concern Present (2024)    [employer] - Occupational Stress Questionnaire      Feeling of Stress : Only a little   Social Connections: Unknown (2024)    Social Connection and Isolation Panel [NHANES]       "Frequency of Communication with Friends and Family: Not on file      Frequency of Social Gatherings with Friends and Family: Once a week      Attends [employer] Services: Not on file      Active Member of Clubs or Organizations: Not on file      Attends Club or Organization Meetings: Not on file      Marital Status: Not on file   Interpersonal Safety: Low Risk  (10/2/2024)    Interpersonal Safety      Do you feel physically and emotionally safe where you currently live?: Yes      Within the past 12 months, have you been hit, slapped, kicked or otherwise physically hurt by someone?: No      Within the past 12 months, have you been humiliated or emotionally abused in other ways by your partner or ex-partner?: No   Housing Stability: Low Risk  (9/28/2024)    Housing Stability      Do you have housing? : Yes      Are you worried about losing your housing?: No         PHYSICAL EXAMINATION:  Vitals:    11/12/24 0830   BP: 135/69   Pulse: 65   SpO2: 97%   Weight: 90.2 kg (198 lb 12.8 oz)   Height: 1.905 m (6' 3\")       GENERAL:  No acute distress.  Pleasant and cooperative.   PSYCH:  Normal mood and affect.  HEAD:  Normocephalic.  SPEECH:  No dysarthria.  EYES:  No scleral icterus.  Wearing glasses.  EARS:  Hearing is intact to spoken voice.  NOSE:  Midline, symmetric, no rhinorrhea.  LUNGS:  No respiratory distress.  No increased work of breathing.  VASCULAR/PULSES:  Posterior tibial:  Right 2+.  Left 2+.  LOWER EXTREMITIES: No clubbing or cyanosis bilaterally.  2+ pitting edema bilateral ankles and feet.    BALANCE AND GAIT: The patient stands and ambulates with a mild forward trunk lean.  He has a reciprocal gait pattern without antalgia..  He is able to toe walk and heel walk without difficulty.  He has mild difficulty tandem walking.  Double leg squat is performed normally.  Right single-leg squat is performed normally.  Left single-leg squat is performed normally.    INSPECTION:  There is no erythema, ecchymosis, " deformity, asymmetry, or abnormality of the low back.    LUMBOPELVIC PALPATION:  Lumbar Spinous Processes:  not tender.  Lumbar Paraspinals:  Right not tender.  Left not tender.  Posterior Superior Iliac Spine:  Right not tender.  Left not tender.  Superior Cluneal Nerves:  Right not tender.  Left not tender.  Iliac Crest:  Right not tender.  Left not tender.  Sacroiliac Joint:  Right not tender.  Left not tender.  Hip External Rotators:  Right not tender.  Left not tender.  Ischial Tuberosity:  Right not tender.  Left not tender.  Greater Trochanter:  Right not tender.  Left not tender.  Coccyx:  not tender.    THORACOLUMBAR RANGE OF MOTION:  Forward flexion (85 ): Moderately reduced range of motion, does not cause pain, does not cause radiating pain.  Extension (30 ): Severely reduced range of motion, does not cause pain, does not cause radiating pain.  Lateral bending right (30 ):  Severely reduced range of motion, does not cause pain, does not cause radiating pain.  Lateral bending left (30 ):  Severely reduced range of motion, does not cause pain, does not cause radiating pain.  Twisting right with extension:  Severely reduced range of motion, does not cause pain, does not cause radiating pain.  Twisting left with extension:  Severely reduced range of motion, does not cause pain, does not cause radiating pain.    SACROILIAC RANGE OF MOTION:  Standing flexion:  Right -.  Left -.  Seated flexion:  Right -.  Left -.  One-leg stork (Gillet):  Right -.  Left -.  Sacroiliac joint dysfunction:  Right -.  Left -.    HIP RANGE OF MOTION:  Flexion (110 ):  Right 110 .  Left 110 .  Extension (30 ):  Right 0 .  Left 0 .  External rotation (45 ):  Right 35 .  Left 35 .  Internal rotation (35 ):  Right 25 .  Left 25 .    KNEE RANGE OF MOTION:  Extension (0 ):  Right 0 .  Left 0 .  Flexion (135 ):  Right 140 .  Left 140 .    STRENGTH:  Hip flexion:  Right 5/5.  Left 5/5.  Hip abduction:  Right 5/5.  Left 5/5.  Hip external  rotation:  Right 5/5.  Left 5/5.  Hip extension:  Right 5/5.  Left 5/5.  Knee extension:  Right 5/5.  Left 5/5.  Knee flexion:  Right 5/5.  Left 5/5.  Ankle dorsiflexion:  Right 5/5.  Left 5/5.  Great toe dorsiflexion:  Right 5/5.  Left 5/5.  Ankle plantar flexion:  Right 5/5.  Left 5/5.    SENSATION:  Intact to light touch along right L2, L3, L4, L5, S1, S2.  Intact to light touch along left L2, L3, L5, S1, S2.  Diminished to light touch along left L4.    REFLEXES:  Patellar (L2-L4):  Right 1+.  Left 1+.  Medial hamstrings (L5-S1):  Right 1+.  Left 1+.  Achilles (S1-S2):  Right 1+.  Left 1+.  Babinski:  Right downgoing.  Left downgoing.  Forced ankle dorsiflexion (clonus):  Right 0 beats.  Left 0 beats.    LUMBOPELVIC SPECIAL TESTS:  Tyra finger:  Right -.  Left -.  Gapping / Distraction:  Right -.  Left -.  Log roll:  Right -.  Left -.  Straight-leg raise (Lasegue):  Right -.  Left -.  Crossed-straight leg raise:  Right -.  Left -.  Resisted straight leg raise:  Right -.  Left -.  FABERE (Tomas):  Right -.  Left -.  FADIR:  Right -.  Left -.  Hip scour:  Right -.  Left -.  Lateral sacral compression:  Right -.  Left -.  Clamshell:  Right -.  Left -.  Femoral nerve stretch:  Right -.  Left -.  Crossed femoral nerve stretch:  Right -.  Left -.  Ely s:  Right +.  Left +.  Yeoman s:  Right -.  Left -.  Midline sacral thrust:  Right -.  Left -.  Noble compression:  Right -.  Left -.          IMAGING:  EXAM: MR LUMBAR SPINE W/O and W CONTRAST  LOCATION: [facility]  DATE: 10/19/2024     INDICATION: Lung cancer, retroperitoneal fibrosis.  COMPARISON: None.  CONTRAST: 9mL Neal  TECHNIQUE: Routine Lumbar Spine MRI without and with IV contrast.     FINDINGS:   Nomenclature is based on 5 lumbar-type vertebral bodies. Lateral alignment is normal. Slight retrolisthesis L5 on S1. Vertebral body heights are preserved. Visualized marrow space is unremarkable. No finding for osseous metastatic disease.  No pars defects.     Postsurgical changes relating to partial laminectomy on the left at L5-S1. Dorsal paraspinal musculature and psoas muscles are otherwise normal in appearance. Visualized aorta is normal in caliber.     Distalmost spinal cord is unremarkable. Conus terminates at the lower L1 level. No filar abnormality. No abnormal spinal cord or nerve root enhancement.     T12-L1: Mild interspace narrowing. Loss of normal disc T2 prolongation. No disc herniation. No facet arthropathy. No spinal canal or neural foraminal stenosis.     L1-L2: Normal disc height. Loss of normal disc T2 prolongation. No disc herniation. No facet arthropathy. No spinal canal or neural foraminal stenosis.     L2-L3: Normal disc height. Loss of normal disc T2 prolongation. No disc herniation. No facet arthropathy. No spinal canal or neural foraminal stenosis.     L3-L4: Mild posterior interspace narrowing. Loss of normal disc T2 prolongation. Shallow posterior disc bulge. Mild facet arthropathy. No spinal canal or neural foraminal stenosis.     L4-L5: Moderate posterior interspace narrowing. Loss of normal disc T2 prolongation. Shallow broad-based posterior disc bulge with superimposed moderate-sized central and posterior lateral caudally-directed disc extrusion (sagittal images 5-7 of series 601, axial images 46-50 of series 901). There is associated right lateral recess stenosis with mass effect upon the traversing right L5 nerve root. Mild facet arthropathy. Moderate to severe spinal canal stenosis. Mild to moderate right and mild left neural foraminal stenosis.     L5-S1: Slight retrolisthesis L5 on S1 Moderate interspace narrowing. Loss of normal disc T2 prolongation. Shallow posterior disc bulge of minor posterior interbody ridging. Mild left-sided facet arthropathy. Prior partial laminectomy on the left. No spinal canal stenosis. No right foraminal stenosis. Borderline left foraminal stenosis.    IMPRESSION:  1.  Moderate-sized  central and right posterolateral caudally-directed disc extrusion at L4-5. This contributes to moderate to severe spinal canal stenosis and right lateral recess stenosis with mass effect upon the traversing right L5 nerve root. Correlate for right L5 radiculopathy.     2.  Combination of interspace narrowing, shallow disc osteophyte complex, and facet changes contribute to mild left foraminal narrowing at L5-S1. Postsurgical changes relating to partial laminectomy on the left at L5-S1.     3.  No abnormal marrow enhancement to suggest osseous metastatic disease. No abnormal spinal cord or nerve root enhancement.        LUMBAR SPINE TWO TO THREE VIEWS July 30, 2024 11:26 AM      HISTORY: Midline and to right low back pain.     COMPARISON: None.    IMPRESSION: Alignment of the lumbar spine is within normal limits. No loss of vertebral body height. Mild degenerative endplate changes and loss of disc height at L4-L5 and L5-S1.          ASSESSMENT/PLAN:  Mr. Lauri Mcgee is an 79-year-old male.  He has a lumbosacral disc extrusion at L4-L5 contacting the right L5 nerve root.  Mr. Lauri Mcgee has a resolving right lumbosacral radiculopathy (likely L5).  Based on history, Mr. Mcgee has a chronic left lumbosacral radiculopathy.  Discussed diagnoses, pathophysiologies, and treatment options with Mr. Lauri Mcgee.  Discussed the options of doing nothing/living with it, physical therapy, core strengthening, oral medications such as gabapentin and pregabalin, lumbosacral epidural corticosteroid injection, referral to neurosurgery.  Mr. Lauri Mcgee is feeling improved.  He should continue with the home exercise program from physical therapy.  Mr. Mcgee is to follow-up in this clinic as needed.        Total Time on encounter:  56 minutes were spent on one more or more of the following:  discussion with patient, history, exam, coordinating care, treatment goals, record review, documenting clinical information, and/or data  review.      Guy Kramer MD        Again, thank you for allowing me to participate in the care of your patient.        Sincerely,        Guy Kramre MD

## 2024-11-12 NOTE — PROGRESS NOTES
PHYSICAL MEDICINE & REHABILITATION / MEDICAL SPINE        Date:  Nov 12, 2024    Name:  Lauri Mcgee  YOB: 1945  MRN:  [id number]          REASON FOR CONSULTATION:  Chronic right-sided low back pain with right-sided sciatica.        REFERRING PROVIDER:  Yenni Sewell MD        CHART REVIEW:  Reviewed 10/02/2024 note from Yenni Sewell MD (internal medicine).  Mr. Lauri Mcgee continued to have right-sided low back pain.  He was still having pain after 8 weeks of physical therapy.  MRI lumbar spine was ordered.  Referral to medical spine was placed.        HISTORY OF PRESENT ILLNESS:  Mr. Lauri Mcgee is an 79-year-old male.  He states in 1972 at the age of 27 he had surgery for a herniated disk with symptoms that radiated to his left lower extremity.  He has noted some numbness in the sole of his left foot and in the toes of his left foot since that time.  Mr. Lauri Mcgee denies any other injuries or surgeries of his mid back, low back, pelvis, hips, thighs, knees, legs, ankles, feet, toes.    Mr. Lauri Mcgee's mother had gout.  Mr. Mcgee denies any other personal or family history of autoimmune diseases, rheumatologic diseases, gout, pseudogout, neurologic diseases, inflammatory bowel diseases.    Mr. Lauri Mcgee began noting pain in his right buttock, right lateral hip, and right groin beginning in mid July 2024.  Mr. Lauri Mcgee has noticed some numbness tingling in the sole of his right foot and the toes of his right foot since mid July 2024.  Mr. Lauri Mcgee notes that his right buttock, right lateral hip, and right groin pain is intermittent.  He notes there is intermittent radiation into his right posterior thigh and right posterior leg.    Mr. Lauri Mcgee is feeling well today.  He does not have any pain today.  He states that it has been 2 weeks since he has had any back pain.  He states he has felt pretty well since mid September 2024.  Mr. Lauri Mcgee notes  "that his right low back/buttock pain with radiation to the right lateral hip, right groin, and right lower extremity is intermittent.  This pain is described as \"spasms, aches.\"  This pain does not occur daily.  This pain seems to be worsened overnight with sleeping and worsens with sitting.  Physical therapy has provided improvement in this pain.  Mr. Lauri Mcgee continues to perform the home exercise program from physical therapy daily.  He also notes that movement helps his pain.    Mr. Lauri Mcgee states that his pain does not change with coughing, sneezing.  Driving and hitting a pothole can worsen his pain.  Mr. Lauri Mcgee's pain does not keep him awake, but it has woken him.    Mr. Lauri Mcgee denies any weakness.  He denies any catching, locking, popping.  Mr. Mcgee denies any bruising or redness.  He does note some swelling in his feet and ankles bilaterally.  Mr. Lauri Mcgee notes giveway episodes of his lower extremities once per quarter.  He is tripping/stumbling once per year.  He has fallen 1 time in the last 20 years.  Mr. Mcgee is not using any assistive devices for ambulation.  Mr. Lauri Mcgee denies any other numbness, tingling, pins-and-needles.  He denies any saddle anesthesia.  Mr. Mcgee notes episodes of bowel incontinence and bladder incontinence approximately once per month.  Mr. Lauri Mcgee denies that his bilateral lower extremities become weak and tired with standing or walking.    Mr. Lauri Mcgee has not tried acupuncture, chiropractor treatments, injections, massage.  He continues to perform the home exercise program from physical therapy daily and notes benefit from this.  Mr. Lauri Mcgee is taking gabapentin 300 mg daily.  He is not taking any other pain medications.        REVIEW OF SYSTEMS:  As detailed in the history of present illness.        ALLERGIES:  Allergies   Allergen Reactions    Smoke.          MEDICATIONS:  Current Outpatient Medications "   Medication Sig Dispense Refill    albuterol (PROAIR HFA/PROVENTIL HFA/VENTOLIN HFA) 108 (90 Base) MCG/ACT inhaler Inhale 2 puffs into the lungs every 4 hours as needed for shortness of breath, wheezing or cough 18 g 4    atorvastatin (LIPITOR) 10 MG tablet Take 1 tablet (10 mg) by mouth daily 90 tablet 3    azaTHIOprine (IMURAN) 50 MG tablet Take 50 mg by mouth daily 30 tablet 1    blood glucose (NO BRAND SPECIFIED) test strip Use to test blood sugar 3 times daily or as directed. 300 strip 1    blood glucose monitoring (SOFTCLIX) lancets Use to test blood sugar 2 times daily. 100 each 6    Cholecalciferol (VITAMIN D) 1000 UNITS capsule Take 1 capsule by mouth daily.      empagliflozin (JARDIANCE) 10 MG TABS tablet Take 1 tablet (10 mg) by mouth daily 30 tablet 5    famotidine (PEPCID) 20 MG tablet Take 1 tablet (20 mg) by mouth 2 times daily 180 tablet 4    Fluticasone-Umeclidin-Vilant (TRELEGY ELLIPTA) 100-62.5-25 MCG/ACT oral inhaler INHALE 1 PUFF ONCE DAILY 90 each 11    gabapentin (NEURONTIN) 100 MG capsule TAKE 3 CAPSULES BY MOUTH ONCE DAILY AT BEDTIME THEN 1 TO 2 CAPSULES 2 TIMES DAILY AS NEEDED FOR BACK PAIN 180 capsule 3    lisinopril (ZESTRIL) 2.5 MG tablet Take 1 tablet (2.5 mg) by mouth daily 90 tablet 3    metFORMIN (GLUCOPHAGE XR) 500 MG 24 hr tablet Take 3 tablets (1,500 mg) by mouth daily (with dinner) 270 tablet 11    predniSONE (DELTASONE) 5 MG tablet 2.5 mg           PAST MEDICAL HISTORY:  Past Medical History:   Diagnosis Date    Abnormal CT lung screening 07/25/2019    [facility]         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         PAST SURGICAL HISTORY:  Past Surgical History:   Procedure Laterality Date    BACK SURGERY  1972    Disk surgery    BIOPSY  2006    fibrosis    CARDIAC NUC ALEX STRESS TEST NL  06    normal cardiolyte test    COLONOSCOPY  2017    EYE SURGERY  2/10/2014    catraract    GENITOURINARY SURGERY  2013    Ureteral stent. removed 10/28/2013    TONSILLECTOMY      ZZC NONSPECIFIC PROCEDURE  1971    surgery for herniated disk         FAMILY HISTORY:  Family History   Problem Relation Age of Onset    Cerebrovascular Disease Father         Due to blood clots from leg injury    Heart Disease Father     Coronary Artery Disease Father     Circulatory Mother         anurism    Osteoporosis Mother     Diabetes Type 2  Brother     Diabetes Brother     Colon Cancer Brother     Substance Abuse Brother     Diabetes Type 2  Sister     Diabetes Sister     Diabetes Type 2  Paternal Grandfather     Diabetes Paternal Grandfather     Cerebrovascular Disease Paternal Grandfather     Hypertension Maternal Grandmother     Osteoporosis Maternal Grandmother     Coronary Artery Disease Maternal Grandfather     Diabetes Brother     Diabetes Sister     Colon Cancer Brother     Other Cancer Brother         Kidney    Asthma Brother          SOCIAL HISTORY:  Social History     Socioeconomic History    Marital status: Single     Spouse name: Not on file    Number of children: Not on file    Years of education: Not on file    Highest education level: Bachelor's degree (e.g., BA, AB, BS)   Occupational History    Occupation:      Employer: [employer]     Comment: [employer]   Tobacco Use    Smoking status: Former     Current packs/day: 0.00     Average packs/day: 0.5 packs/day for 47.6 years (23.8 ttl pk-yrs)     Types: Cigarettes, Cigars, Pipe     Start date: 3/1/1966     Quit date: 10/1/2013     Years since quittin.1    Smokeless  tobacco: Never    Tobacco comments:     occ   Vaping Use    Vaping status: Never Used   Substance and Sexual Activity    Alcohol use: Yes     Comment: None since 7/2006    Drug use: No    Sexual activity: Never   Other Topics Concern    Parent/sibling w/ CABG, MI or angioplasty before 65F 55M? Yes     Comment: Father; due to blood clots from leg injury   Social History Narrative    Not on file     Social Drivers of Health     Financial Resource Strain: Low Risk  (9/28/2024)    Financial Resource Strain     Within the past 12 months, have you or your family members you live with been unable to get utilities (heat, electricity) when it was really needed?: No   Food Insecurity: Low Risk  (9/28/2024)    Food Insecurity     Within the past 12 months, did you worry that your food would run out before you got money to buy more?: No     Within the past 12 months, did the food you bought just not last and you didn t have money to get more?: No   Transportation Needs: Low Risk  (9/28/2024)    Transportation Needs     Within the past 12 months, has lack of transportation kept you from medical appointments, getting your medicines, non-medical meetings or appointments, work, or from getting things that you need?: No   Physical Activity: Insufficiently Active (9/28/2024)    Exercise Vital Sign     Days of Exercise per Week: 3 days     Minutes of Exercise per Session: 30 min   Stress: No Stress Concern Present (9/28/2024)    [employer] [place] [employer] - Occupational Stress Questionnaire     Feeling of Stress : Only a little   Social Connections: Unknown (9/28/2024)    Social Connection and Isolation Panel [NHANES]     Frequency of Communication with Friends and Family: Not on file     Frequency of Social Gatherings with Friends and Family: Once a week     Attends Sabianism Services: Not on file     Active Member of Clubs or Organizations: Not on file     Attends Club or Organization Meetings: Not on file     Marital  "Status: Not on file   Interpersonal Safety: Low Risk  (10/2/2024)    Interpersonal Safety     Do you feel physically and emotionally safe where you currently live?: Yes     Within the past 12 months, have you been hit, slapped, kicked or otherwise physically hurt by someone?: No     Within the past 12 months, have you been humiliated or emotionally abused in other ways by your partner or ex-partner?: No   Housing Stability: Low Risk  (9/28/2024)    Housing Stability     Do you have housing? : Yes     Are you worried about losing your housing?: No         PHYSICAL EXAMINATION:  Vitals:    11/12/24 0830   BP: 135/69   Pulse: 65   SpO2: 97%   Weight: 90.2 kg (198 lb 12.8 oz)   Height: 1.905 m (6' 3\")       GENERAL:  No acute distress.  Pleasant and cooperative.   PSYCH:  Normal mood and affect.  HEAD:  Normocephalic.  SPEECH:  No dysarthria.  EYES:  No scleral icterus.  Wearing glasses.  EARS:  Hearing is intact to spoken voice.  NOSE:  Midline, symmetric, no rhinorrhea.  LUNGS:  No respiratory distress.  No increased work of breathing.  VASCULAR/PULSES:  Posterior tibial:  Right 2+.  Left 2+.  LOWER EXTREMITIES: No clubbing or cyanosis bilaterally.  2+ pitting edema bilateral ankles and feet.    BALANCE AND GAIT: The patient stands and ambulates with a mild forward trunk lean.  He has a reciprocal gait pattern without antalgia..  He is able to toe walk and heel walk without difficulty.  He has mild difficulty tandem walking.  Double leg squat is performed normally.  Right single-leg squat is performed normally.  Left single-leg squat is performed normally.    INSPECTION:  There is no erythema, ecchymosis, deformity, asymmetry, or abnormality of the low back.    LUMBOPELVIC PALPATION:  Lumbar Spinous Processes:  not tender.  Lumbar Paraspinals:  Right not tender.  Left not tender.  Posterior Superior Iliac Spine:  Right not tender.  Left not tender.  Superior Cluneal Nerves:  Right not tender.  Left not tender.  Iliac " Crest:  Right not tender.  Left not tender.  Sacroiliac Joint:  Right not tender.  Left not tender.  Hip External Rotators:  Right not tender.  Left not tender.  Ischial Tuberosity:  Right not tender.  Left not tender.  Greater Trochanter:  Right not tender.  Left not tender.  Coccyx:  not tender.    THORACOLUMBAR RANGE OF MOTION:  Forward flexion (85 ): Moderately reduced range of motion, does not cause pain, does not cause radiating pain.  Extension (30 ): Severely reduced range of motion, does not cause pain, does not cause radiating pain.  Lateral bending right (30 ):  Severely reduced range of motion, does not cause pain, does not cause radiating pain.  Lateral bending left (30 ):  Severely reduced range of motion, does not cause pain, does not cause radiating pain.  Twisting right with extension:  Severely reduced range of motion, does not cause pain, does not cause radiating pain.  Twisting left with extension:  Severely reduced range of motion, does not cause pain, does not cause radiating pain.    SACROILIAC RANGE OF MOTION:  Standing flexion:  Right -.  Left -.  Seated flexion:  Right -.  Left -.  One-leg stork (Gillet):  Right -.  Left -.  Sacroiliac joint dysfunction:  Right -.  Left -.    HIP RANGE OF MOTION:  Flexion (110 ):  Right 110 .  Left 110 .  Extension (30 ):  Right 0 .  Left 0 .  External rotation (45 ):  Right 35 .  Left 35 .  Internal rotation (35 ):  Right 25 .  Left 25 .    KNEE RANGE OF MOTION:  Extension (0 ):  Right 0 .  Left 0 .  Flexion (135 ):  Right 140 .  Left 140 .    STRENGTH:  Hip flexion:  Right 5/5.  Left 5/5.  Hip abduction:  Right 5/5.  Left 5/5.  Hip external rotation:  Right 5/5.  Left 5/5.  Hip extension:  Right 5/5.  Left 5/5.  Knee extension:  Right 5/5.  Left 5/5.  Knee flexion:  Right 5/5.  Left 5/5.  Ankle dorsiflexion:  Right 5/5.  Left 5/5.  Great toe dorsiflexion:  Right 5/5.  Left 5/5.  Ankle plantar flexion:  Right 5/5.  Left 5/5.    SENSATION:  Intact to light  touch along right L2, L3, L4, L5, S1, S2.  Intact to light touch along left L2, L3, L5, S1, S2.  Diminished to light touch along left L4.    REFLEXES:  Patellar (L2-L4):  Right 1+.  Left 1+.  Medial hamstrings (L5-S1):  Right 1+.  Left 1+.  Achilles (S1-S2):  Right 1+.  Left 1+.  Babinski:  Right downgoing.  Left downgoing.  Forced ankle dorsiflexion (clonus):  Right 0 beats.  Left 0 beats.    LUMBOPELVIC SPECIAL TESTS:  Tyra finger:  Right -.  Left -.  Gapping / Distraction:  Right -.  Left -.  Log roll:  Right -.  Left -.  Straight-leg raise (Lasegue):  Right -.  Left -.  Crossed-straight leg raise:  Right -.  Left -.  Resisted straight leg raise:  Right -.  Left -.  FABERE (Tomas):  Right -.  Left -.  FADIR:  Right -.  Left -.  Hip scour:  Right -.  Left -.  Lateral sacral compression:  Right -.  Left -.  Clamshell:  Right -.  Left -.  Femoral nerve stretch:  Right -.  Left -.  Crossed femoral nerve stretch:  Right -.  Left -.  Ely s:  Right +.  Left +.  Yeoman s:  Right -.  Left -.  Midline sacral thrust:  Right -.  Left -.  Noble compression:  Right -.  Left -.          IMAGING:  EXAM: MR LUMBAR SPINE W/O and W CONTRAST  LOCATION: [facility]  DATE: 10/19/2024     INDICATION: Lung cancer, retroperitoneal fibrosis.  COMPARISON: None.  CONTRAST: 9mL Neal  TECHNIQUE: Routine Lumbar Spine MRI without and with IV contrast.     FINDINGS:   Nomenclature is based on 5 lumbar-type vertebral bodies. Lateral alignment is normal. Slight retrolisthesis L5 on S1. Vertebral body heights are preserved. Visualized marrow space is unremarkable. No finding for osseous metastatic disease. No pars defects.     Postsurgical changes relating to partial laminectomy on the left at L5-S1. Dorsal paraspinal musculature and psoas muscles are otherwise normal in appearance. Visualized aorta is normal in caliber.     Distalmost spinal cord is unremarkable. Conus terminates at the lower L1 level. No filar  abnormality. No abnormal spinal cord or nerve root enhancement.     T12-L1: Mild interspace narrowing. Loss of normal disc T2 prolongation. No disc herniation. No facet arthropathy. No spinal canal or neural foraminal stenosis.     L1-L2: Normal disc height. Loss of normal disc T2 prolongation. No disc herniation. No facet arthropathy. No spinal canal or neural foraminal stenosis.     L2-L3: Normal disc height. Loss of normal disc T2 prolongation. No disc herniation. No facet arthropathy. No spinal canal or neural foraminal stenosis.     L3-L4: Mild posterior interspace narrowing. Loss of normal disc T2 prolongation. Shallow posterior disc bulge. Mild facet arthropathy. No spinal canal or neural foraminal stenosis.     L4-L5: Moderate posterior interspace narrowing. Loss of normal disc T2 prolongation. Shallow broad-based posterior disc bulge with superimposed moderate-sized central and posterior lateral caudally-directed disc extrusion (sagittal images 5-7 of series 601, axial images 46-50 of series 901). There is associated right lateral recess stenosis with mass effect upon the traversing right L5 nerve root. Mild facet arthropathy. Moderate to severe spinal canal stenosis. Mild to moderate right and mild left neural foraminal stenosis.     L5-S1: Slight retrolisthesis L5 on S1 Moderate interspace narrowing. Loss of normal disc T2 prolongation. Shallow posterior disc bulge of minor posterior interbody ridging. Mild left-sided facet arthropathy. Prior partial laminectomy on the left. No spinal canal stenosis. No right foraminal stenosis. Borderline left foraminal stenosis.    IMPRESSION:  1.  Moderate-sized central and right posterolateral caudally-directed disc extrusion at L4-5. This contributes to moderate to severe spinal canal stenosis and right lateral recess stenosis with mass effect upon the traversing right L5 nerve root. Correlate for right L5 radiculopathy.     2.  Combination of interspace narrowing,  shallow disc osteophyte complex, and facet changes contribute to mild left foraminal narrowing at L5-S1. Postsurgical changes relating to partial laminectomy on the left at L5-S1.     3.  No abnormal marrow enhancement to suggest osseous metastatic disease. No abnormal spinal cord or nerve root enhancement.        LUMBAR SPINE TWO TO THREE VIEWS July 30, 2024 11:26 AM      HISTORY: Midline and to right low back pain.     COMPARISON: None.    IMPRESSION: Alignment of the lumbar spine is within normal limits. No loss of vertebral body height. Mild degenerative endplate changes and loss of disc height at L4-L5 and L5-S1.          ASSESSMENT/PLAN:  Mr. Lauri Mcgee is an 79-year-old male.  He has a lumbosacral disc extrusion at L4-L5 contacting the right L5 nerve root.  Mr. Lauri Mcgee has a resolving right lumbosacral radiculopathy (likely L5).  Based on history, Mr. Mcgee has a chronic left lumbosacral radiculopathy.  Discussed diagnoses, pathophysiologies, and treatment options with Mr. Lauri Mcgee.  Discussed the options of doing nothing/living with it, physical therapy, core strengthening, oral medications such as gabapentin and pregabalin, lumbosacral epidural corticosteroid injection, referral to neurosurgery.  Mr. Lauri Mcgee is feeling improved.  He should continue with the home exercise program from physical therapy.  Mr. Mcgee is to follow-up in this clinic as needed.        Total Time on encounter:  56 minutes were spent on one more or more of the following:  discussion with patient, history, exam, coordinating care, treatment goals, record review, documenting clinical information, and/or data review.      Guy Kramer MD

## 2024-11-26 ENCOUNTER — TRANSFERRED RECORDS (OUTPATIENT)
Dept: HEALTH INFORMATION MANAGEMENT | Facility: CLINIC | Age: 79
End: 2024-11-26
Payer: COMMERCIAL

## 2024-11-26 LAB
ALT SERPL-CCNC: 18 IU/L (ref 9–46)
AST SERPL-CCNC: 18 U/L (ref 10–40)
CREATININE (EXTERNAL): 1.2 MG/DL (ref 0.6–1.35)

## 2024-12-02 ENCOUNTER — LAB (OUTPATIENT)
Dept: LAB | Facility: CLINIC | Age: 79
End: 2024-12-02
Payer: COMMERCIAL

## 2024-12-02 DIAGNOSIS — N18.1 TYPE 2 DIABETES MELLITUS WITH STAGE 1 CHRONIC KIDNEY DISEASE, WITH LONG-TERM CURRENT USE OF INSULIN (H): ICD-10-CM

## 2024-12-02 DIAGNOSIS — E11.22 TYPE 2 DIABETES MELLITUS WITH STAGE 1 CHRONIC KIDNEY DISEASE, WITH LONG-TERM CURRENT USE OF INSULIN (H): ICD-10-CM

## 2024-12-02 DIAGNOSIS — Z79.4 TYPE 2 DIABETES MELLITUS WITH STAGE 1 CHRONIC KIDNEY DISEASE, WITH LONG-TERM CURRENT USE OF INSULIN (H): ICD-10-CM

## 2024-12-02 LAB
EST. AVERAGE GLUCOSE BLD GHB EST-MCNC: 154 MG/DL
HBA1C MFR BLD: 7 % (ref 0–5.6)

## 2024-12-02 PROCEDURE — 36415 COLL VENOUS BLD VENIPUNCTURE: CPT

## 2024-12-02 PROCEDURE — 83036 HEMOGLOBIN GLYCOSYLATED A1C: CPT

## 2024-12-11 ENCOUNTER — TRANSFERRED RECORDS (OUTPATIENT)
Dept: HEALTH INFORMATION MANAGEMENT | Facility: CLINIC | Age: 79
End: 2024-12-11
Payer: COMMERCIAL

## 2024-12-23 ENCOUNTER — TRANSFERRED RECORDS (OUTPATIENT)
Dept: HEALTH INFORMATION MANAGEMENT | Facility: CLINIC | Age: 79
End: 2024-12-23
Payer: COMMERCIAL

## 2024-12-28 DIAGNOSIS — Z79.4 TYPE 2 DIABETES MELLITUS WITH STAGE 2 CHRONIC KIDNEY DISEASE, WITH LONG-TERM CURRENT USE OF INSULIN (H): ICD-10-CM

## 2024-12-28 DIAGNOSIS — N18.2 TYPE 2 DIABETES MELLITUS WITH STAGE 2 CHRONIC KIDNEY DISEASE, WITH LONG-TERM CURRENT USE OF INSULIN (H): ICD-10-CM

## 2024-12-28 DIAGNOSIS — E11.22 TYPE 2 DIABETES MELLITUS WITH STAGE 2 CHRONIC KIDNEY DISEASE, WITH LONG-TERM CURRENT USE OF INSULIN (H): ICD-10-CM

## 2024-12-30 RX ORDER — BLOOD SUGAR DIAGNOSTIC
STRIP MISCELLANEOUS
Qty: 300 STRIP | Refills: 2 | Status: SHIPPED | OUTPATIENT
Start: 2024-12-30

## 2025-01-06 DIAGNOSIS — K68.2 RETROPERITONEAL FIBROSIS: Primary | ICD-10-CM

## 2025-01-16 ENCOUNTER — LAB (OUTPATIENT)
Dept: LAB | Facility: CLINIC | Age: 80
End: 2025-01-16
Payer: COMMERCIAL

## 2025-01-16 ENCOUNTER — OFFICE VISIT (OUTPATIENT)
Dept: NEPHROLOGY | Facility: CLINIC | Age: 80
End: 2025-01-16
Attending: INTERNAL MEDICINE
Payer: COMMERCIAL

## 2025-01-16 VITALS
BODY MASS INDEX: 24.78 KG/M2 | HEIGHT: 74 IN | HEART RATE: 66 BPM | WEIGHT: 193.1 LBS | SYSTOLIC BLOOD PRESSURE: 123 MMHG | OXYGEN SATURATION: 91 % | TEMPERATURE: 97.6 F | DIASTOLIC BLOOD PRESSURE: 75 MMHG

## 2025-01-16 DIAGNOSIS — K68.2 RETROPERITONEAL FIBROSIS: ICD-10-CM

## 2025-01-16 DIAGNOSIS — R94.4 DECREASED GFR: ICD-10-CM

## 2025-01-16 LAB
ALBUMIN MFR UR ELPH: 21.2 MG/DL
ALBUMIN SERPL BCG-MCNC: 4.1 G/DL (ref 3.5–5.2)
ALBUMIN UR-MCNC: 10 MG/DL
ANION GAP SERPL CALCULATED.3IONS-SCNC: 9 MMOL/L (ref 7–15)
APPEARANCE UR: CLEAR
BILIRUB UR QL STRIP: NEGATIVE
BUN SERPL-MCNC: 23.6 MG/DL (ref 8–23)
CALCIUM SERPL-MCNC: 9.8 MG/DL (ref 8.8–10.4)
CHLORIDE SERPL-SCNC: 103 MMOL/L (ref 98–107)
COLOR UR AUTO: ABNORMAL
CREAT SERPL-MCNC: 1.29 MG/DL (ref 0.67–1.17)
CREAT UR-MCNC: 103 MG/DL
CREAT UR-MCNC: 106 MG/DL
EGFRCR SERPLBLD CKD-EPI 2021: 56 ML/MIN/1.73M2
ERYTHROCYTE [DISTWIDTH] IN BLOOD BY AUTOMATED COUNT: 14 % (ref 10–15)
GLUCOSE SERPL-MCNC: 136 MG/DL (ref 70–99)
GLUCOSE UR STRIP-MCNC: >=1000 MG/DL
HCO3 SERPL-SCNC: 24 MMOL/L (ref 22–29)
HCT VFR BLD AUTO: 41.4 % (ref 40–53)
HGB BLD-MCNC: 13.3 G/DL (ref 13.3–17.7)
HGB UR QL STRIP: NEGATIVE
KETONES UR STRIP-MCNC: NEGATIVE MG/DL
LEUKOCYTE ESTERASE UR QL STRIP: NEGATIVE
MCH RBC QN AUTO: 32.2 PG (ref 26.5–33)
MCHC RBC AUTO-ENTMCNC: 32.1 G/DL (ref 31.5–36.5)
MCV RBC AUTO: 100 FL (ref 78–100)
MICROALBUMIN UR-MCNC: 46.3 MG/L
MICROALBUMIN/CREAT UR: 43.68 MG/G CR (ref 0–17)
NITRATE UR QL: NEGATIVE
PH UR STRIP: 5 [PH] (ref 5–7)
PHOSPHATE SERPL-MCNC: 3.2 MG/DL (ref 2.5–4.5)
PLATELET # BLD AUTO: 259 10E3/UL (ref 150–450)
POTASSIUM SERPL-SCNC: 4.7 MMOL/L (ref 3.4–5.3)
PROT/CREAT 24H UR: 0.21 MG/MG CR (ref 0–0.2)
RBC # BLD AUTO: 4.13 10E6/UL (ref 4.4–5.9)
RBC URINE: 0 /HPF
SODIUM SERPL-SCNC: 136 MMOL/L (ref 135–145)
SP GR UR STRIP: 1.03 (ref 1–1.03)
UROBILINOGEN UR STRIP-MCNC: NORMAL MG/DL
WBC # BLD AUTO: 6.4 10E3/UL (ref 4–11)
WBC URINE: 1 /HPF

## 2025-01-16 PROCEDURE — 99000 SPECIMEN HANDLING OFFICE-LAB: CPT | Performed by: PATHOLOGY

## 2025-01-16 PROCEDURE — 81001 URINALYSIS AUTO W/SCOPE: CPT | Performed by: PATHOLOGY

## 2025-01-16 PROCEDURE — 80069 RENAL FUNCTION PANEL: CPT | Performed by: PATHOLOGY

## 2025-01-16 PROCEDURE — 99204 OFFICE O/P NEW MOD 45 MIN: CPT | Performed by: INTERNAL MEDICINE

## 2025-01-16 PROCEDURE — 85027 COMPLETE CBC AUTOMATED: CPT | Performed by: PATHOLOGY

## 2025-01-16 PROCEDURE — 84156 ASSAY OF PROTEIN URINE: CPT | Performed by: PATHOLOGY

## 2025-01-16 PROCEDURE — 36415 COLL VENOUS BLD VENIPUNCTURE: CPT | Performed by: PATHOLOGY

## 2025-01-16 PROCEDURE — G0463 HOSPITAL OUTPT CLINIC VISIT: HCPCS | Performed by: INTERNAL MEDICINE

## 2025-01-16 PROCEDURE — 82570 ASSAY OF URINE CREATININE: CPT | Performed by: INTERNAL MEDICINE

## 2025-01-16 ASSESSMENT — PAIN SCALES - GENERAL: PAINLEVEL_OUTOF10: NO PAIN (0)

## 2025-01-16 NOTE — PROGRESS NOTES
Assessment and Plan:    # Retroperitoneal fibrosis  # CKD stage 3A  79 y.o pt with baseline creatinine of 0.9-1.0 and now since mid 2024 up to 1.2 with EGFR in the upper 50s. UA with >1000 glucosuria (on Jardiance) and minimal protein, no hematuria. UPCR is 0.21 . Most recent imaging of his kidneys in August 2024 showed no hydronephrosis and increased velocities in both renal arteries but waveforms that are normal. Likely no THIAGO. He has a history of retroperitoneal fibrosis, we discussed that this may lead to ureteral obstruction -> hydronephrosis, ischemia from compression of renal arteries and recurrent infections. Other risk factors include HTN and poor renal reserved.   - Avoid nephrotoxins such as unnecessary contrast, NSAIDs, hypotension, dehydration   - strict BP and BG control with goal A1c of < 7  - check BP at home   - good hydration   - close follow up for stability of renal function.  - RAASi: Lisinopril 2.5 mg  - SGLT2: Jardiance 10 mg  - Statin: Atorvastatin 10 mg     # Hypertension :  BP are 123/75 in clinic, checking BP at home currently and controlled. Compliant with meds.    -check BP at home     # Electrolytes :  Stable     # Acid / base :  Bicarb is stable at 24.     # BMD :  Secondary hyperparathyroidism  Sp wnl, phos, VIT D needs recheck. PTH needs to be checked. Will continue to monitor for now.     # Anemia :  Hb is 13.5     Assessment and plan was discussed with patient and he voiced his understanding and agreement.    Consult:  Lauri Mcgee was seen in consultation at the request of Dr. Sewell for CKD management.    Reason for Visit: CKD STAGE 3A    HPI:  Lauri Mcgee is a 79 year old male with PMH of DM2, CKD stage 3A, hypercholesterolemia, COPD on 2L O2, highly suspicious R upper lung nodule treated with stereotactic radiation in April 2021 then again in September 2024 for a suspicious right middle lobe lung nodule, and idiopathic retroperitoneal fibrosis.    Notes that in July  2024 his back pain started (has history of disc disease s/p surgery in the 70s). He only takes ASA or tyelnol. Not sure if he hqas taken any advil.     Per chart review, rheumatology recommended azathioprine 50 mg daily and prednisone 2.5 mg daily.    2013 had hydronephrosis from ureter obstruction due to fibrosis.     Feb 2024 Rheum      Renal History:   No FH of kidney disease. No macroscopic hematuria. No nephrolithiasis.    ROS:   A comprehensive review of systems was obtained and negative, except as noted in the HPI or PMH.    Active Medical Problems:  Patient Active Problem List   Diagnosis    Retroperitoneal fibrosis    Gastroesophageal reflux disease without esophagitis    Hypercholesterolemia    Tubular adenoma of colon    Stage 3 severe COPD by GOLD classification (H)    Type 2 diabetes mellitus with stage 1 chronic kidney disease, with long-term current use of insulin (H)    Carcinoma of right lung (H)    Lumbosacral disc herniation    Lumbosacral radiculopathy     PMH:   Medical record was reviewed and PMH was discussed with patient and noted below.  Past Medical History:   Diagnosis Date    Abnormal CT lung screening 07/25/2019    Currently managed with follow up imaging by [email or web address].         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     PSH:   Past Surgical History:   Procedure Laterality Date    BACK SURGERY  2/1972    Disk surgery    BIOPSY  7/2006    fibrosis    CARDIAC NUC ALEX STRESS TEST NL  7/26/06    normal cardiolyte test    COLONOSCOPY  11/2017    EYE SURGERY  2/10/2014    catraract    GENITOURINARY SURGERY   2013    Ureteral stent. removed 10/28/2013    TONSILLECTOMY      ZZC NONSPECIFIC PROCEDURE  1971    surgery for herniated disk       Family Hx:   Family History   Problem Relation Age of Onset    Cerebrovascular Disease Father         Due to blood clots from leg injury    Heart Disease Father     Coronary Artery Disease Father     Circulatory Mother         anurism    Osteoporosis Mother     Diabetes Type 2  Brother     Diabetes Brother     Colon Cancer Brother     Substance Abuse Brother     Diabetes Type 2  Sister     Diabetes Sister     Diabetes Type 2  Paternal Grandfather     Diabetes Paternal Grandfather     Cerebrovascular Disease Paternal Grandfather     Hypertension Maternal Grandmother     Osteoporosis Maternal Grandmother     Coronary Artery Disease Maternal Grandfather     Diabetes Brother     Diabetes Sister     Colon Cancer Brother     Other Cancer Brother         Kidney    Asthma Brother      Personal Hx:   Social History     Tobacco Use    Smoking status: Former     Current packs/day: 0.00     Average packs/day: 0.5 packs/day for 47.6 years (23.8 ttl pk-yrs)     Types: Cigarettes, Cigars, Pipe     Start date: 3/1/1966     Quit date: 10/1/2013     Years since quittin.3    Smokeless tobacco: Never    Tobacco comments:     occ   Substance Use Topics    Alcohol use: Yes     Comment: None since 2006       Allergies:  Allergies   Allergen Reactions    Smoke.        Medications:  Current Outpatient Medications   Medication Sig Dispense Refill    albuterol (PROAIR HFA/PROVENTIL HFA/VENTOLIN HFA) 108 (90 Base) MCG/ACT inhaler Inhale 2 puffs into the lungs every 4 hours as needed for shortness of breath, wheezing or cough 18 g 4    atorvastatin (LIPITOR) 10 MG tablet Take 1 tablet (10 mg) by mouth daily 90 tablet 3    azaTHIOprine (IMURAN) 50 MG tablet Take 50 mg by mouth daily 30 tablet 1    blood glucose (ACCU-CHEK GUIDE TEST) test strip USE 1 EACH TO CHECK GLUCOSE THREE TIMES DAILY AS DIRECTED  300 strip 2    blood glucose monitoring (SOFTCLIX) lancets Use to test blood sugar 2 times daily. 100 each 6    Cholecalciferol (VITAMIN D) 1000 UNITS capsule Take 1 capsule by mouth daily.      empagliflozin (JARDIANCE) 10 MG TABS tablet Take 1 tablet (10 mg) by mouth daily 30 tablet 5    famotidine (PEPCID) 20 MG tablet Take 1 tablet (20 mg) by mouth 2 times daily 180 tablet 4    Fluticasone-Umeclidin-Vilant (TRELEGY ELLIPTA) 100-62.5-25 MCG/ACT oral inhaler INHALE 1 PUFF ONCE DAILY 90 each 11    gabapentin (NEURONTIN) 100 MG capsule TAKE 3 CAPSULES BY MOUTH ONCE DAILY AT BEDTIME THEN 1 TO 2 CAPSULES 2 TIMES DAILY AS NEEDED FOR BACK PAIN 180 capsule 3    lisinopril (ZESTRIL) 2.5 MG tablet Take 1 tablet (2.5 mg) by mouth daily 90 tablet 3    metFORMIN (GLUCOPHAGE XR) 500 MG 24 hr tablet Take 3 tablets (1,500 mg) by mouth daily (with dinner) 270 tablet 11    predniSONE (DELTASONE) 5 MG tablet 2.5 mg       No current facility-administered medications for this visit.      Vitals:  There were no vitals taken for this visit.    Exam:  GENERAL APPEARANCE: alert and no distress  HENT: mouth without ulcers or lesions  RESP: lungs clear to auscultation - no rales, rhonchi or wheezes  CV: regular rhythm, normal rate, no rub, no murmur  EDEMA: no LE edema bilaterally  ABDOMEN: soft, nondistended, nontender, bowel sounds normal  MS: extremities normal - no gross deformities noted, no evidence of inflammation in joints, no muscle tenderness  SKIN: no rash    LABS:   CMP  Recent Labs   Lab Test 10/02/24  1035 09/30/24  0903 07/30/24  1134 07/31/23  1207 03/08/21  0000 02/25/21  0918 12/07/20  0000 09/22/20  0837 06/10/20  0000 01/20/20  0943 02/21/19  0958    140 136 138   < >  --   --  139  --  138 137   POTASSIUM 4.1 4.4 4.8 4.3   < >  --   --  4.2  --  4.3 3.8   CHLORIDE 102 105 101 103   < >  --   --  105  --  106 105   CO2 22 24 24 24   < >  --   --  29  --  27 29   ANIONGAP 11 11 11 11   < >  --   --  5  --  5 3    * 139* 147* 103*   < >  --   --  99  --  105* 91   BUN 24.1* 21.6 25.0* 22.9   < >  --   --  21  --  24 18   CR 1.26* 1.27* 1.28* 1.07   < >  --    < > 1.03   < > 1.11 1.10   GFRESTIMATED 57* 57* 57* 71   < > 59*  --  70  --  64 65   GFRESTBLACK  --   --   --   --   --  71  --  81  --  74 75   PRESTON 9.5 9.5 10.0 9.9   < >  --   --  9.8  --  10.3* 9.2    < > = values in this interval not displayed.     Recent Labs   Lab Test 07/30/24  1134 06/07/21  0000 03/08/21  0000 12/07/20  0000 09/22/20  0837 06/10/20  0000 01/14/19  1103 04/25/18  0000 03/27/18  1047   BILITOTAL 0.6  --   --   --  0.2  --  0.4  --  0.5   ALKPHOS 56  --   --   --  46  --  38*  --  46   ALT 24 17 19 19 24   < > 24   < > 30   AST 22 18 21 24 17   < > 15   < > 24    < > = values in this interval not displayed.     CBC  Recent Labs   Lab Test 01/16/25  1309 07/30/24  1134 09/22/20  0837 03/21/17  0743   HGB 13.3 12.5* 13.1* 13.6   WBC 6.4 5.5 6.1 4.8   RBC 4.13* 3.81* 4.00* 4.06*   HCT 41.4 38.7* 40.5 40.2    102* 101* 99   MCH 32.2 32.8 32.8 33.5*   MCHC 32.1 32.3 32.3 33.8   RDW 14.0 14.0 13.1 13.4    185 211 208     URINE STUDIES  No lab results found.  No lab results found.  PTH  No lab results found.  IRON STUDIES  Recent Labs   Lab Test 03/21/17  0743   IRON 101      IRONSAT 28         Mariza Perkins MD

## 2025-01-16 NOTE — LETTER
1/16/2025       RE: Lauri Mcgee  [address] [place] [address]     Dear Colleague,    Thank you for referring your patient, Lauri Mcgee, to the [facility] [place] at [facility]. Please see a copy of my visit note below.    Assessment and Plan:    # Retroperitoneal fibrosis  # CKD stage 3A  79 y.o pt with baseline creatinine of 0.9-1.0 and now since mid 2024 up to 1.2 with EGFR in the upper 50s. UA with >1000 glucosuria (on Jardiance) and minimal protein, no hematuria. UPCR is 0.21 . Most recent imaging of his kidneys in August 2024 showed no hydronephrosis and increased velocities in both renal arteries but waveforms that are normal. Likely no THIAGO. He has a history of retroperitoneal fibrosis, we discussed that this may lead to ureteral obstruction -> hydronephrosis, ischemia from compression of renal arteries and recurrent infections. Other risk factors include HTN and poor renal reserved.   - Avoid nephrotoxins such as unnecessary contrast, NSAIDs, hypotension, dehydration   - strict BP and BG control with goal A1c of < 7  - check BP at home   - good hydration   - close follow up for stability of renal function.  - RAASi: Lisinopril 2.5 mg  - SGLT2: Jardiance 10 mg  - Statin: Atorvastatin 10 mg     # Hypertension :  BP are 123/75 in clinic, checking BP at home currently and controlled. Compliant with meds.    -check BP at home     # Electrolytes :  Stable     # Acid / base :  Bicarb is stable at 24.     # BMD :  Secondary hyperparathyroidism  Sp wnl, phos, VIT D needs recheck. PTH needs to be checked. Will continue to monitor for now.     # Anemia :  Hb is 13.5     Assessment and plan was discussed with patient and he voiced his understanding and agreement.    Consult:  Lauri Mcgee was seen in consultation at the request of Dr. Sewell for CKD management.    Reason for Visit: CKD STAGE 3A    HPI:  Lauri Mcgee is a 79 year  old male with PMH of DM2, CKD stage 3A, hypercholesterolemia, COPD on 2L O2, highly suspicious R upper lung nodule treated with stereotactic radiation in April 2021 then again in September 2024 for a suspicious right middle lobe lung nodule, and idiopathic retroperitoneal fibrosis.    Notes that in July 2024 his back pain started (has history of disc disease s/p surgery in the 70s). He only takes ASA or tyelnol. Not sure if he hqas taken any advil.     Per chart review, rheumatology recommended azathioprine 50 mg daily and prednisone 2.5 mg daily.    2013 had hydronephrosis from ureter obstruction due to fibrosis.     Feb 2024 Rheum      Renal History:   No FH of kidney disease. No macroscopic hematuria. No nephrolithiasis.    ROS:   A comprehensive review of systems was obtained and negative, except as noted in the HPI or PMH.    Active Medical Problems:  Patient Active Problem List   Diagnosis     Retroperitoneal fibrosis     Gastroesophageal reflux disease without esophagitis     Hypercholesterolemia     Tubular adenoma of colon     Stage 3 severe COPD by GOLD classification (H)     Type 2 diabetes mellitus with stage 1 chronic kidney disease, with long-term current use of insulin (H)     Carcinoma of right lung (H)     Lumbosacral disc herniation     Lumbosacral radiculopathy     PMH:   Medical record was reviewed and PMH was discussed with patient and noted below.  Past Medical History:   Diagnosis Date     Abnormal CT lung screening 07/25/2019    Currently managed with follow up imaging by [facility].          Arthritis 1954    Growth induced     Carcinoma of right lung (H) 07/19/2021     COPD (chronic obstructive pulmonary disease) (H) 06/2006     Diabetes (H) 06/02/2013    Insulin use terminated 1/13/2014; resumed 11/2/2016     NONSPECIFIC MEDICAL HISTORY 07/2006    retroperitoneal fibrosis without ureteric obstr     Other specified gastritis without mention of hemorrhage 08/2006     egd, on double dose PPI.     PSH:   Past Surgical History:   Procedure Laterality Date     BACK SURGERY  1972    Disk surgery     BIOPSY  2006    fibrosis     CARDIAC NUC ALEX STRESS TEST NL  06    normal cardiolyte test     COLONOSCOPY  2017     EYE SURGERY  2/10/2014    catraract     GENITOURINARY SURGERY  2013    Ureteral stent. removed 10/28/2013     TONSILLECTOMY       ZZC NONSPECIFIC PROCEDURE  1971    surgery for herniated disk       Family Hx:   Family History   Problem Relation Age of Onset     Cerebrovascular Disease Father         Due to blood clots from leg injury     Heart Disease Father      Coronary Artery Disease Father      Circulatory Mother         anurism     Osteoporosis Mother      Diabetes Type 2  Brother      Diabetes Brother      Colon Cancer Brother      Substance Abuse Brother      Diabetes Type 2  Sister      Diabetes Sister      Diabetes Type 2  Paternal Grandfather      Diabetes Paternal Grandfather      Cerebrovascular Disease Paternal Grandfather      Hypertension Maternal Grandmother      Osteoporosis Maternal Grandmother      Coronary Artery Disease Maternal Grandfather      Diabetes Brother      Diabetes Sister      Colon Cancer Brother      Other Cancer Brother         Kidney     Asthma Brother      Personal Hx:   Social History     Tobacco Use     Smoking status: Former     Current packs/day: 0.00     Average packs/day: 0.5 packs/day for 47.6 years (23.8 ttl pk-yrs)     Types: Cigarettes, Cigars, Pipe     Start date: 3/1/1966     Quit date: 10/1/2013     Years since quittin.3     Smokeless tobacco: Never     Tobacco comments:     occ   Substance Use Topics     Alcohol use: Yes     Comment: None since 2006       Allergies:  Allergies   Allergen Reactions     Smoke.        Medications:  Current Outpatient Medications   Medication Sig Dispense Refill     albuterol (PROAIR HFA/PROVENTIL HFA/VENTOLIN HFA) 108 (90 Base) MCG/ACT inhaler Inhale 2 puffs into the  lungs every 4 hours as needed for shortness of breath, wheezing or cough 18 g 4     atorvastatin (LIPITOR) 10 MG tablet Take 1 tablet (10 mg) by mouth daily 90 tablet 3     azaTHIOprine (IMURAN) 50 MG tablet Take 50 mg by mouth daily 30 tablet 1     blood glucose (ACCU-CHEK GUIDE TEST) test strip USE 1 EACH TO CHECK GLUCOSE THREE TIMES DAILY AS DIRECTED 300 strip 2     blood glucose monitoring (SOFTCLIX) lancets Use to test blood sugar 2 times daily. 100 each 6     Cholecalciferol (VITAMIN D) 1000 UNITS capsule Take 1 capsule by mouth daily.       empagliflozin (JARDIANCE) 10 MG TABS tablet Take 1 tablet (10 mg) by mouth daily 30 tablet 5     famotidine (PEPCID) 20 MG tablet Take 1 tablet (20 mg) by mouth 2 times daily 180 tablet 4     Fluticasone-Umeclidin-Vilant (TRELEGY ELLIPTA) 100-62.5-25 MCG/ACT oral inhaler INHALE 1 PUFF ONCE DAILY 90 each 11     gabapentin (NEURONTIN) 100 MG capsule TAKE 3 CAPSULES BY MOUTH ONCE DAILY AT BEDTIME THEN 1 TO 2 CAPSULES 2 TIMES DAILY AS NEEDED FOR BACK PAIN 180 capsule 3     lisinopril (ZESTRIL) 2.5 MG tablet Take 1 tablet (2.5 mg) by mouth daily 90 tablet 3     metFORMIN (GLUCOPHAGE XR) 500 MG 24 hr tablet Take 3 tablets (1,500 mg) by mouth daily (with dinner) 270 tablet 11     predniSONE (DELTASONE) 5 MG tablet 2.5 mg       No current facility-administered medications for this visit.      Vitals:  There were no vitals taken for this visit.    Exam:  GENERAL APPEARANCE: alert and no distress  HENT: mouth without ulcers or lesions  RESP: lungs clear to auscultation - no rales, rhonchi or wheezes  CV: regular rhythm, normal rate, no rub, no murmur  EDEMA: no LE edema bilaterally  ABDOMEN: soft, nondistended, nontender, bowel sounds normal  MS: extremities normal - no gross deformities noted, no evidence of inflammation in joints, no muscle tenderness  SKIN: no rash    LABS:   CMP  Recent Labs   Lab Test 10/02/24  1035 09/30/24  0903 07/30/24  1134 07/31/23  1207 03/08/21  0000  02/25/21  0918 12/07/20  0000 09/22/20  0837 06/10/20  0000 01/20/20  0943 02/21/19  0958    140 136 138   < >  --   --  139  --  138 137   POTASSIUM 4.1 4.4 4.8 4.3   < >  --   --  4.2  --  4.3 3.8   CHLORIDE 102 105 101 103   < >  --   --  105  --  106 105   CO2 22 24 24 24   < >  --   --  29  --  27 29   ANIONGAP 11 11 11 11   < >  --   --  5  --  5 3   * 139* 147* 103*   < >  --   --  99  --  105* 91   BUN 24.1* 21.6 25.0* 22.9   < >  --   --  21  --  24 18   CR 1.26* 1.27* 1.28* 1.07   < >  --    < > 1.03   < > 1.11 1.10   GFRESTIMATED 57* 57* 57* 71   < > 59*  --  70  --  64 65   GFRESTBLACK  --   --   --   --   --  71  --  81  --  74 75   PRESTON 9.5 9.5 10.0 9.9   < >  --   --  9.8  --  10.3* 9.2    < > = values in this interval not displayed.     Recent Labs   Lab Test 07/30/24  1134 06/07/21  0000 03/08/21  0000 12/07/20  0000 09/22/20  0837 06/10/20  0000 01/14/19  1103 04/25/18  0000 03/27/18  1047   BILITOTAL 0.6  --   --   --  0.2  --  0.4  --  0.5   ALKPHOS 56  --   --   --  46  --  38*  --  46   ALT 24 17 19 19 24   < > 24   < > 30   AST 22 18 21 24 17   < > 15   < > 24    < > = values in this interval not displayed.     CBC  Recent Labs   Lab Test 01/16/25  1309 07/30/24  1134 09/22/20  0837 03/21/17  0743   HGB 13.3 12.5* 13.1* 13.6   WBC 6.4 5.5 6.1 4.8   RBC 4.13* 3.81* 4.00* 4.06*   HCT 41.4 38.7* 40.5 40.2    102* 101* 99   MCH 32.2 32.8 32.8 33.5*   MCHC 32.1 32.3 32.3 33.8   RDW 14.0 14.0 13.1 13.4    185 211 208     URINE STUDIES  No lab results found.  No lab results found.  PTH  No lab results found.  IRON STUDIES  Recent Labs   Lab Test 03/21/17  0743   IRON 101      IRONSAT 28         Mariza Perkins MD      Again, thank you for allowing me to participate in the care of your patient.      Sincerely,    Mariza Perkins MD

## 2025-01-16 NOTE — PATIENT INSTRUCTIONS
Today you were seen in the kidney clinic for chronic kidney disease stage 3A (likely)  Continue taking care of yourself as you are. Monitor blood pressure occasionally at home. Continue taking your medications as prescribed. We will see you in 6 months.   Hydrate with at least 60 oz per day.

## 2025-01-16 NOTE — NURSING NOTE
"Chief Complaint   Patient presents with    RECHECK     Follow up.      Vitals:    01/16/25 1345 01/16/25 1348 01/16/25 1350 01/16/25 1351   BP: 125/76 120/75 122/75 123/75   BP Location: Right arm Right arm Right arm    Patient Position: Sitting Sitting Sitting    Cuff Size: Adult Regular Adult Regular Adult Regular    Pulse: 66      Temp: 97.6  F (36.4  C)      TempSrc: Oral      SpO2: 91%      Weight: 87.6 kg (193 lb 1.6 oz)      Height: 1.88 m (6' 2\")          BP Readings from Last 3 Encounters:   01/16/25 123/75   11/12/24 135/69   10/02/24 118/67       /75   Pulse 66   Temp 97.6  F (36.4  C) (Oral)   Ht 1.88 m (6' 2\")   Wt 87.6 kg (193 lb 1.6 oz)   SpO2 91%   BMI 24.79 kg/m       Christy Chacon    "

## 2025-01-21 ENCOUNTER — TRANSFERRED RECORDS (OUTPATIENT)
Dept: HEALTH INFORMATION MANAGEMENT | Facility: CLINIC | Age: 80
End: 2025-01-21
Payer: COMMERCIAL

## 2025-01-29 ENCOUNTER — TRANSFERRED RECORDS (OUTPATIENT)
Dept: HEALTH INFORMATION MANAGEMENT | Facility: CLINIC | Age: 80
End: 2025-01-29

## 2025-01-29 ENCOUNTER — ANCILLARY PROCEDURE (OUTPATIENT)
Dept: CT IMAGING | Facility: CLINIC | Age: 80
End: 2025-01-29
Attending: THORACIC SURGERY (CARDIOTHORACIC VASCULAR SURGERY)
Payer: COMMERCIAL

## 2025-01-29 DIAGNOSIS — R91.1 LUNG NODULE, SOLITARY: ICD-10-CM

## 2025-01-29 PROCEDURE — 71250 CT THORAX DX C-: CPT

## 2025-02-05 ENCOUNTER — OFFICE VISIT (OUTPATIENT)
Dept: PEDIATRICS | Facility: CLINIC | Age: 80
End: 2025-02-05
Payer: COMMERCIAL

## 2025-02-05 VITALS
SYSTOLIC BLOOD PRESSURE: 103 MMHG | HEIGHT: 74 IN | DIASTOLIC BLOOD PRESSURE: 60 MMHG | OXYGEN SATURATION: 99 % | TEMPERATURE: 97.5 F | BODY MASS INDEX: 24.77 KG/M2 | HEART RATE: 81 BPM | WEIGHT: 193 LBS

## 2025-02-05 DIAGNOSIS — J43.9 PULMONARY EMPHYSEMA, UNSPECIFIED EMPHYSEMA TYPE (H): ICD-10-CM

## 2025-02-05 DIAGNOSIS — E11.22 TYPE 2 DIABETES MELLITUS WITH STAGE 3A CHRONIC KIDNEY DISEASE, WITH LONG-TERM CURRENT USE OF INSULIN (H): Primary | ICD-10-CM

## 2025-02-05 DIAGNOSIS — C34.91 CARCINOMA OF RIGHT LUNG (H): ICD-10-CM

## 2025-02-05 DIAGNOSIS — Z12.11 SCREEN FOR COLON CANCER: ICD-10-CM

## 2025-02-05 DIAGNOSIS — Z79.4 TYPE 2 DIABETES MELLITUS WITH STAGE 3A CHRONIC KIDNEY DISEASE, WITH LONG-TERM CURRENT USE OF INSULIN (H): Primary | ICD-10-CM

## 2025-02-05 DIAGNOSIS — Z79.52 LONG TERM SYSTEMIC STEROID USER: ICD-10-CM

## 2025-02-05 DIAGNOSIS — N18.31 TYPE 2 DIABETES MELLITUS WITH STAGE 3A CHRONIC KIDNEY DISEASE, WITH LONG-TERM CURRENT USE OF INSULIN (H): Primary | ICD-10-CM

## 2025-02-05 DIAGNOSIS — Z79.52 IMMUNOSUPPRESSION DUE TO CHRONIC STEROID USE: ICD-10-CM

## 2025-02-05 DIAGNOSIS — D84.821 IMMUNOSUPPRESSION DUE TO CHRONIC STEROID USE: ICD-10-CM

## 2025-02-05 DIAGNOSIS — M85.89 OSTEOPENIA OF MULTIPLE SITES: ICD-10-CM

## 2025-02-05 DIAGNOSIS — N18.31 STAGE 3A CHRONIC KIDNEY DISEASE (H): ICD-10-CM

## 2025-02-05 DIAGNOSIS — T38.0X5A IMMUNOSUPPRESSION DUE TO CHRONIC STEROID USE: ICD-10-CM

## 2025-02-05 PROCEDURE — G2211 COMPLEX E/M VISIT ADD ON: HCPCS | Performed by: INTERNAL MEDICINE

## 2025-02-05 PROCEDURE — 99214 OFFICE O/P EST MOD 30 MIN: CPT | Performed by: INTERNAL MEDICINE

## 2025-02-05 ASSESSMENT — PAIN SCALES - GENERAL: PAINLEVEL_OUTOF10: NO PAIN (0)

## 2025-02-05 NOTE — PATIENT INSTRUCTIONS
Ask Dr. Rosas about getting your labs done at our clinic before you see him to coordinate diabetes labs.     Call [facility] to set up a sleep apnea visit to discuss further evaluation.

## 2025-02-05 NOTE — PROGRESS NOTES
Assessment & Plan     Type 2 diabetes mellitus with stage 3a chronic kidney disease, with long-term current use of insulin (H)  A1C at 7/0 2 months ago. Recently started jardiance. Will see how next A1C looks.   - Hemoglobin A1c; Future    Stage 3a chronic kidney disease (H)  Has seen nephrology, appreciate assistance. GFR now stable.     Carcinoma of right lung (H)  Continue to follow    Pulmonary emphysema, unspecified emphysema type (H)  Had recent URI, some increase in SOB and sputum, but did recover without changing medications. Exam today unremarkable.    Screen for colon cancer  Had to postpone his colonoscopy. Due to large polyps, is getting this 1 year after last.    Immunosuppression due to chronic steroid use  Osteopenia of multiple sites  Brother with leg fracture last month. Prior dexa done at [facility], but I don't see any recent. Will order.  - DX Bone Density; Future    Long term systemic steroid user    - DX Bone Density; Future    The longitudinal plan of care for the diagnosis(es)/condition(s) as documented were addressed during this visit. Due to the added complexity in care, I will continue to support Theo in the subsequent management and with ongoing continuity of care.    Blood sugar testing frequency justification:  Adjustment of medication(s)    See Patient Instructions    Subjective   Theo is a 79 year old, presenting for the following health issues:  RECHECK (DM)        2/5/2025     1:07 PM   Additional Questions   Roomed by Argelia Mcgill CMA         2/5/2025     1:07 PM   Patient Reported Additional Medications   Patient reports taking the following new medications N/A     History of Present Illness       CKD: He is not using over the counter pain medicine.     Diabetes:   He presents for follow up of diabetes.  He is checking home blood glucose two times daily.   He checks blood glucose before and after meals.  Blood glucose is never over 200 and never under 70.  When his blood glucose is  "low, the patient is asymptomatic for confusion, blurred vision, lethargy and reports not feeling dizzy, shaky, or weak.  He is concerned about other.   He is having numbness in feet and weight loss.            He eats 2-3 servings of fruits and vegetables daily.He consumes 0 sweetened beverage(s) daily.He exercises with enough effort to increase his heart rate 20 to 29 minutes per day.  He exercises with enough effort to increase his heart rate 7 days per week.   He is taking medications regularly.     Went to visit his brother in [place]. Broken hip. Drove down. Other brother he drove down with him got sick, then he got sick too.     Minimal back pain. Most days is gone. Does his PT exercises every day. If he does have pain, he does them more often. Lasts usually 1-2 days.            Review of Systems  Constitutional, HEENT, cardiovascular, pulmonary, gi and gu systems are negative, except as otherwise noted.      Objective    /60 (BP Location: Right arm, Patient Position: Sitting, Cuff Size: Adult Regular)   Pulse 81   Temp 97.5  F (36.4  C) (Oral)   Ht 1.88 m (6' 2\")   Wt 87.5 kg (193 lb)   SpO2 99%   BMI 24.78 kg/m    Body mass index is 24.78 kg/m .  Physical Exam   GENERAL: alert and no distress  NECK: no adenopathy, no asymmetry, masses, or scars  RESP: lungs clear to auscultation, distant - no rales, rhonchi or wheezes  CV: regular rate and rhythm, normal S1 S2, no S3 or S4, no murmur, click or rub, no peripheral edema  MS: no gross musculoskeletal defects noted, no edema      Signed Electronically by: Yenni Sewell MD    "

## 2025-02-06 ENCOUNTER — PATIENT OUTREACH (OUTPATIENT)
Dept: CARE COORDINATION | Facility: CLINIC | Age: 80
End: 2025-02-06
Payer: COMMERCIAL

## 2025-02-10 ENCOUNTER — DOCUMENTATION ONLY (OUTPATIENT)
Dept: PEDIATRICS | Facility: CLINIC | Age: 80
End: 2025-02-10
Payer: COMMERCIAL

## 2025-02-10 DIAGNOSIS — J44.9 CHRONIC OBSTRUCTIVE PULMONARY DISEASE, UNSPECIFIED COPD TYPE (H): Primary | ICD-10-CM

## 2025-02-10 DIAGNOSIS — Z79.4 TYPE 2 DIABETES MELLITUS WITH STAGE 1 CHRONIC KIDNEY DISEASE, WITH LONG-TERM CURRENT USE OF INSULIN (H): ICD-10-CM

## 2025-02-10 DIAGNOSIS — E11.22 TYPE 2 DIABETES MELLITUS WITH STAGE 1 CHRONIC KIDNEY DISEASE, WITH LONG-TERM CURRENT USE OF INSULIN (H): ICD-10-CM

## 2025-02-10 DIAGNOSIS — N18.1 TYPE 2 DIABETES MELLITUS WITH STAGE 1 CHRONIC KIDNEY DISEASE, WITH LONG-TERM CURRENT USE OF INSULIN (H): ICD-10-CM

## 2025-02-11 ENCOUNTER — TELEPHONE (OUTPATIENT)
Dept: NEPHROLOGY | Facility: CLINIC | Age: 80
End: 2025-02-11
Payer: COMMERCIAL

## 2025-02-11 NOTE — TELEPHONE ENCOUNTER
Left Voicemail (1st Attempt) for the patient to call back and schedule the following:    Appointment type: RGD  Provider: MAGDALENA EDWARDS  Return date: 07/16/2025  Specialty phone number: [phone]  Additional appointment(s) needed: LABS  Additonal Notes: N/A

## 2025-02-26 ENCOUNTER — ANCILLARY PROCEDURE (OUTPATIENT)
Dept: BONE DENSITY | Facility: CLINIC | Age: 80
End: 2025-02-26
Attending: INTERNAL MEDICINE
Payer: COMMERCIAL

## 2025-02-26 DIAGNOSIS — M85.89 OSTEOPENIA OF MULTIPLE SITES: ICD-10-CM

## 2025-02-26 DIAGNOSIS — Z79.52 LONG TERM SYSTEMIC STEROID USER: ICD-10-CM

## 2025-02-26 PROCEDURE — 77080 DXA BONE DENSITY AXIAL: CPT | Mod: TC | Performed by: PHYSICIAN ASSISTANT

## 2025-03-03 DIAGNOSIS — E11.22 TYPE 2 DIABETES MELLITUS WITH STAGE 2 CHRONIC KIDNEY DISEASE, WITH LONG-TERM CURRENT USE OF INSULIN (H): ICD-10-CM

## 2025-03-03 DIAGNOSIS — Z79.4 TYPE 2 DIABETES MELLITUS WITH STAGE 2 CHRONIC KIDNEY DISEASE, WITH LONG-TERM CURRENT USE OF INSULIN (H): ICD-10-CM

## 2025-03-03 DIAGNOSIS — N18.2 TYPE 2 DIABETES MELLITUS WITH STAGE 2 CHRONIC KIDNEY DISEASE, WITH LONG-TERM CURRENT USE OF INSULIN (H): ICD-10-CM

## 2025-03-03 RX ORDER — LANCETS
EACH MISCELLANEOUS
Qty: 100 EACH | Refills: 2 | Status: SHIPPED | OUTPATIENT
Start: 2025-03-03

## 2025-03-06 ENCOUNTER — MYC MEDICAL ADVICE (OUTPATIENT)
Dept: PEDIATRICS | Facility: CLINIC | Age: 80
End: 2025-03-06
Payer: COMMERCIAL

## 2025-03-06 NOTE — TELEPHONE ENCOUNTER
Let patient know that PCP sent referral for Endo for osteopenia and that they will call him to schedule.  Jen Toure LPN

## 2025-03-10 ENCOUNTER — LAB (OUTPATIENT)
Dept: LAB | Facility: CLINIC | Age: 80
End: 2025-03-10
Payer: COMMERCIAL

## 2025-03-10 DIAGNOSIS — Z79.4 TYPE 2 DIABETES MELLITUS WITH STAGE 3A CHRONIC KIDNEY DISEASE, WITH LONG-TERM CURRENT USE OF INSULIN (H): ICD-10-CM

## 2025-03-10 DIAGNOSIS — M85.89 OSTEOPENIA OF MULTIPLE SITES: ICD-10-CM

## 2025-03-10 DIAGNOSIS — N13.5 OCCLUSION OF URETER: Primary | ICD-10-CM

## 2025-03-10 DIAGNOSIS — N18.31 TYPE 2 DIABETES MELLITUS WITH STAGE 3A CHRONIC KIDNEY DISEASE, WITH LONG-TERM CURRENT USE OF INSULIN (H): ICD-10-CM

## 2025-03-10 DIAGNOSIS — E11.22 TYPE 2 DIABETES MELLITUS WITH STAGE 3A CHRONIC KIDNEY DISEASE, WITH LONG-TERM CURRENT USE OF INSULIN (H): ICD-10-CM

## 2025-03-10 DIAGNOSIS — Z79.899 ENCOUNTER FOR LONG-TERM (CURRENT) USE OF MEDICATIONS: ICD-10-CM

## 2025-03-10 LAB
ALBUMIN SERPL BCG-MCNC: 4.3 G/DL (ref 3.5–5.2)
ALT SERPL W P-5'-P-CCNC: 19 U/L (ref 0–70)
AST SERPL W P-5'-P-CCNC: 23 U/L (ref 0–45)
BASOPHILS # BLD AUTO: 0 10E3/UL (ref 0–0.2)
BASOPHILS NFR BLD AUTO: 0 %
CREAT SERPL-MCNC: 1.27 MG/DL (ref 0.67–1.17)
CRP SERPL-MCNC: <3 MG/L
EGFRCR SERPLBLD CKD-EPI 2021: 57 ML/MIN/1.73M2
EOSINOPHIL # BLD AUTO: 0.1 10E3/UL (ref 0–0.7)
EOSINOPHIL NFR BLD AUTO: 1 %
ERYTHROCYTE [DISTWIDTH] IN BLOOD BY AUTOMATED COUNT: 14.2 % (ref 10–15)
ERYTHROCYTE [SEDIMENTATION RATE] IN BLOOD BY WESTERGREN METHOD: 11 MM/HR (ref 0–20)
EST. AVERAGE GLUCOSE BLD GHB EST-MCNC: 143 MG/DL
HBA1C MFR BLD: 6.6 % (ref 0–5.6)
HCT VFR BLD AUTO: 42.2 % (ref 40–53)
HGB BLD-MCNC: 13.4 G/DL (ref 13.3–17.7)
IMM GRANULOCYTES # BLD: 0 10E3/UL
IMM GRANULOCYTES NFR BLD: 0 %
LYMPHOCYTES # BLD AUTO: 0.6 10E3/UL (ref 0.8–5.3)
LYMPHOCYTES NFR BLD AUTO: 12 %
MCH RBC QN AUTO: 32.1 PG (ref 26.5–33)
MCHC RBC AUTO-ENTMCNC: 31.8 G/DL (ref 31.5–36.5)
MCV RBC AUTO: 101 FL (ref 78–100)
MONOCYTES # BLD AUTO: 0.8 10E3/UL (ref 0–1.3)
MONOCYTES NFR BLD AUTO: 16 %
NEUTROPHILS # BLD AUTO: 3.4 10E3/UL (ref 1.6–8.3)
NEUTROPHILS NFR BLD AUTO: 70 %
PHOSPHATE SERPL-MCNC: 3.2 MG/DL (ref 2.5–4.5)
PLATELET # BLD AUTO: 198 10E3/UL (ref 150–450)
RBC # BLD AUTO: 4.17 10E6/UL (ref 4.4–5.9)
VIT D+METAB SERPL-MCNC: 42 NG/ML (ref 20–50)
WBC # BLD AUTO: 4.8 10E3/UL (ref 4–11)

## 2025-03-10 PROCEDURE — 82565 ASSAY OF CREATININE: CPT

## 2025-03-10 PROCEDURE — 36415 COLL VENOUS BLD VENIPUNCTURE: CPT

## 2025-03-10 PROCEDURE — 82040 ASSAY OF SERUM ALBUMIN: CPT

## 2025-03-10 PROCEDURE — 85652 RBC SED RATE AUTOMATED: CPT

## 2025-03-10 PROCEDURE — 84100 ASSAY OF PHOSPHORUS: CPT

## 2025-03-10 PROCEDURE — 84450 TRANSFERASE (AST) (SGOT): CPT

## 2025-03-10 PROCEDURE — 82306 VITAMIN D 25 HYDROXY: CPT

## 2025-03-10 PROCEDURE — 84460 ALANINE AMINO (ALT) (SGPT): CPT

## 2025-03-10 PROCEDURE — 85025 COMPLETE CBC W/AUTO DIFF WBC: CPT

## 2025-03-10 PROCEDURE — 83036 HEMOGLOBIN GLYCOSYLATED A1C: CPT

## 2025-03-10 PROCEDURE — 86140 C-REACTIVE PROTEIN: CPT

## 2025-05-04 DIAGNOSIS — M54.41 ACUTE RIGHT-SIDED LOW BACK PAIN WITH RIGHT-SIDED SCIATICA: ICD-10-CM

## 2025-05-05 RX ORDER — GABAPENTIN 100 MG/1
CAPSULE ORAL
Qty: 180 CAPSULE | Refills: 0 | Status: SHIPPED | OUTPATIENT
Start: 2025-05-05 | End: 2025-05-06

## 2025-05-06 ENCOUNTER — TELEPHONE (OUTPATIENT)
Dept: PEDIATRICS | Facility: CLINIC | Age: 80
End: 2025-05-06
Payer: COMMERCIAL

## 2025-05-06 DIAGNOSIS — M54.41 ACUTE RIGHT-SIDED LOW BACK PAIN WITH RIGHT-SIDED SCIATICA: ICD-10-CM

## 2025-05-06 RX ORDER — GABAPENTIN 100 MG/1
CAPSULE ORAL
Qty: 180 CAPSULE | Refills: 4 | Status: SHIPPED | OUTPATIENT
Start: 2025-05-06

## 2025-05-06 NOTE — TELEPHONE ENCOUNTER
"Yovani Martinez, Pharmacist with [facility], calling to request clarification on gabapentin 100 mg instructions.     Current sig: \"TAKE 3 CAPSULES BY MOUTH ONCE DAILY AT BEDTIME THEN TAKE 1-2 CAPSULES TWICE DAILY AS NEEDED FOR BACK PAIN. \"    He inquires if sig should instead read: \"TAKE 3 CAPSULES BY MOUTH ONCE DAILY AT BEDTIME AND TAKE 1-2 CAPSULES TWICE DAILY AS NEEDED FOR BACK PAIN. \"    gabapentin (NEURONTIN) 100 MG capsule TAKE 3 CAPSULES BY MOUTH ONCE DAILY AT BEDTIME THEN TAKE 1-2 CAPSULES TWICE DAILY AS NEEDED FOR BACK PAIN. 180 capsule 0 ordered 05/05/2025     Please update rx if agreeable, or further clarify sig. Yovani is requesting callback to pharmacy with update. Please review and advise, thanks!  Juvenal Arciniega RN on 5/6/2025 at 1:14 PM    "

## 2025-05-13 ENCOUNTER — ANESTHESIA EVENT (OUTPATIENT)
Dept: GASTROENTEROLOGY | Facility: CLINIC | Age: 80
End: 2025-05-13
Payer: COMMERCIAL

## 2025-05-13 ENCOUNTER — ANESTHESIA (OUTPATIENT)
Dept: GASTROENTEROLOGY | Facility: CLINIC | Age: 80
End: 2025-05-13
Payer: COMMERCIAL

## 2025-05-13 ENCOUNTER — HOSPITAL ENCOUNTER (OUTPATIENT)
Facility: CLINIC | Age: 80
Discharge: HOME | End: 2025-05-13
Attending: STUDENT IN AN ORGANIZED HEALTH CARE EDUCATION/TRAINING PROGRAM | Admitting: STUDENT IN AN ORGANIZED HEALTH CARE EDUCATION/TRAINING PROGRAM
Payer: COMMERCIAL

## 2025-05-13 VITALS
SYSTOLIC BLOOD PRESSURE: 108 MMHG | RESPIRATION RATE: 15 BRPM | OXYGEN SATURATION: 95 % | DIASTOLIC BLOOD PRESSURE: 62 MMHG | HEART RATE: 76 BPM

## 2025-05-13 LAB — COLONOSCOPY: NORMAL

## 2025-05-13 PROCEDURE — 250N000009 HC RX 250: Performed by: REGISTERED NURSE

## 2025-05-13 PROCEDURE — 45385 COLONOSCOPY W/LESION REMOVAL: CPT | Performed by: STUDENT IN AN ORGANIZED HEALTH CARE EDUCATION/TRAINING PROGRAM

## 2025-05-13 PROCEDURE — 999N000010 HC STATISTIC ANES STAT CODE-CRNA PER MINUTE: Performed by: STUDENT IN AN ORGANIZED HEALTH CARE EDUCATION/TRAINING PROGRAM

## 2025-05-13 PROCEDURE — 250N000011 HC RX IP 250 OP 636: Mod: JZ | Performed by: REGISTERED NURSE

## 2025-05-13 PROCEDURE — 370N000017 HC ANESTHESIA TECHNICAL FEE, PER MIN: Performed by: STUDENT IN AN ORGANIZED HEALTH CARE EDUCATION/TRAINING PROGRAM

## 2025-05-13 PROCEDURE — 258N000003 HC RX IP 258 OP 636: Performed by: REGISTERED NURSE

## 2025-05-13 PROCEDURE — 88305 TISSUE EXAM BY PATHOLOGIST: CPT | Mod: TC | Performed by: STUDENT IN AN ORGANIZED HEALTH CARE EDUCATION/TRAINING PROGRAM

## 2025-05-13 RX ORDER — PROPOFOL 10 MG/ML
INJECTION, EMULSION INTRAVENOUS CONTINUOUS PRN
Status: DISCONTINUED | OUTPATIENT
Start: 2025-05-13 | End: 2025-05-13

## 2025-05-13 RX ORDER — PROPOFOL 10 MG/ML
INJECTION, EMULSION INTRAVENOUS PRN
Status: DISCONTINUED | OUTPATIENT
Start: 2025-05-13 | End: 2025-05-13

## 2025-05-13 RX ORDER — LIDOCAINE HYDROCHLORIDE 20 MG/ML
INJECTION, SOLUTION INFILTRATION; PERINEURAL PRN
Status: DISCONTINUED | OUTPATIENT
Start: 2025-05-13 | End: 2025-05-13

## 2025-05-13 RX ORDER — SODIUM CHLORIDE, SODIUM LACTATE, POTASSIUM CHLORIDE, CALCIUM CHLORIDE 600; 310; 30; 20 MG/100ML; MG/100ML; MG/100ML; MG/100ML
INJECTION, SOLUTION INTRAVENOUS CONTINUOUS PRN
Status: DISCONTINUED | OUTPATIENT
Start: 2025-05-13 | End: 2025-05-13

## 2025-05-13 RX ADMIN — SODIUM CHLORIDE, SODIUM LACTATE, POTASSIUM CHLORIDE, AND CALCIUM CHLORIDE: .6; .31; .03; .02 INJECTION, SOLUTION INTRAVENOUS at 10:42

## 2025-05-13 RX ADMIN — PHENYLEPHRINE HYDROCHLORIDE 100 MCG: 10 INJECTION INTRAVENOUS at 10:57

## 2025-05-13 RX ADMIN — PHENYLEPHRINE HYDROCHLORIDE 100 MCG: 10 INJECTION INTRAVENOUS at 11:07

## 2025-05-13 RX ADMIN — PHENYLEPHRINE HYDROCHLORIDE 100 MCG: 10 INJECTION INTRAVENOUS at 11:02

## 2025-05-13 RX ADMIN — PROPOFOL 125 MCG/KG/MIN: 10 INJECTION, EMULSION INTRAVENOUS at 10:44

## 2025-05-13 RX ADMIN — LIDOCAINE HYDROCHLORIDE 50 MG: 20 INJECTION, SOLUTION INFILTRATION; PERINEURAL at 10:44

## 2025-05-13 ASSESSMENT — LIFESTYLE VARIABLES: TOBACCO_USE: 1

## 2025-05-13 ASSESSMENT — ACTIVITIES OF DAILY LIVING (ADL)
ADLS_ACUITY_SCORE: 41
ADLS_ACUITY_SCORE: 41

## 2025-05-13 ASSESSMENT — ENCOUNTER SYMPTOMS
DYSRHYTHMIAS: 0
SEIZURES: 0

## 2025-05-13 ASSESSMENT — COPD QUESTIONNAIRES
COPD: 1
CAT_SEVERITY: MILD

## 2025-05-13 NOTE — ANESTHESIA POSTPROCEDURE EVALUATION
Patient: Lauri Mcgee    Procedure: Procedure(s):  COLONOSCOPY, FLEXIBLE, WITH LESION REMOVAL USING SNARE       Anesthesia Type:  MAC    Note:  Disposition: Outpatient   Postop Pain Control: Uneventful            Sign Out: Well controlled pain   PONV: No   Neuro/Psych: Uneventful            Sign Out: Acceptable/Baseline neuro status   Airway/Respiratory: Uneventful            Sign Out: Acceptable/Baseline resp. status   CV/Hemodynamics: Uneventful            Sign Out: Acceptable CV status; No obvious hypovolemia; No obvious fluid overload   Other NRE: NONE   DID A NON-ROUTINE EVENT OCCUR? No           Last vitals:  Vitals Value Taken Time   /62 05/13/25 11:40   Temp     Pulse 76 05/13/25 11:40   Resp 22 05/13/25 11:43   SpO2 95 % 05/13/25 11:33   Vitals shown include unfiled device data.    Electronically Signed By: Brunilda Irving MD  May 13, 2025  2:05 PM

## 2025-05-13 NOTE — ANESTHESIA CARE TRANSFER NOTE
Patient: Lauri Mcgee    Procedure: Procedure(s):  COLONOSCOPY, FLEXIBLE, WITH LESION REMOVAL USING SNARE       Diagnosis: Screening for colon cancer [Z12.11]  Diagnosis Additional Information: No value filed.    Anesthesia Type:   MAC     Note:    Oropharynx: oropharynx clear of all foreign objects and spontaneously breathing  Level of Consciousness: awake  Oxygen Supplementation: nasal cannula  Level of Supplemental Oxygen (L/min / FiO2): 3  Independent Airway: airway patency satisfactory and stable  Dentition: dentition unchanged  Vital Signs Stable: post-procedure vital signs reviewed and stable    Patient transferred to: Phase II    Handoff Report: Identifed the Patient, Identified the Reponsible Provider, Reviewed the pertinent medical history, Discussed the surgical course, Reviewed Intra-OP anesthesia mangement and issues during anesthesia, Set expectations for post-procedure period and Allowed opportunity for questions and acknowledgement of understanding    Vitals:  Vitals Value Taken Time   BP     Temp     Pulse 68 05/13/25 11:17   Resp 18 05/13/25 11:17   SpO2 97 % 05/13/25 11:17   Vitals shown include unfiled device data.    Electronically Signed By: CHRISTOPHER Reaves CRNA  May 13, 2025  11:19 AM

## 2025-05-13 NOTE — ANESTHESIA PREPROCEDURE EVALUATION
Anesthesia Pre-Procedure Evaluation    Patient: Lauri Mcgee   MRN: [id number] : 1945          Procedure : Procedure(s):  Colonoscopy         Past Medical History:   Diagnosis Date    Abnormal CT lung screening 2019    [facility]         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      Past Surgical History:   Procedure Laterality Date    BACK SURGERY  1972    Disk surgery    BIOPSY  2006    fibrosis    CARDIAC NUC ALEX STRESS TEST NL  06    normal cardiolyte test    COLONOSCOPY  2017    EYE SURGERY  2/10/2014    catraract    GENITOURINARY SURGERY  2013    Ureteral stent. removed 10/28/2013    TONSILLECTOMY      ZC NONSPECIFIC PROCEDURE  1971    surgery for herniated disk      Allergies   Allergen Reactions    Smoke.       Social History     Tobacco Use    Smoking status: Former     Current packs/day: 0.00     Average packs/day: 0.5 packs/day for 47.6 years (23.8 ttl pk-yrs)     Types: Cigarettes, Cigars, Pipe     Start date: 3/1/1966     Quit date: 10/1/2013     Years since quittin.6     Passive exposure: Past    Smokeless tobacco: Never    Tobacco comments:     occ   Substance Use Topics    Alcohol use: Not Currently     Comment: None since 2006      Wt Readings from Last 1 Encounters:   25 88.7 kg (195 lb 8 oz)        Anesthesia Evaluation   Pt has had prior anesthetic.     No history of anesthetic complications       ROS/MED HX  ENT/Pulmonary:     (+) sleep apnea (Mild, no CPAP recommendeed),               tobacco use, Past use,        mild,  COPD  "(trelegy daily, only needs albuterol PRN every few months),           (-) recent URI   Neurologic:    (-) no seizures, no CVA and no TIA   Cardiovascular:    (-) arrhythmias   METS/Exercise Tolerance: >4 METS    Hematologic:    (-) history of blood clots   Musculoskeletal: Comment: Lumbosacral radiculopathy    Lumbosacral disc herniation        GI/Hepatic:     (+) GERD, Asymptomatic on medication,               (-) liver disease   Renal/Genitourinary:     (+) renal disease (CKD3),             Endo:     (+)  type II DM,                 (-) obesity   Psychiatric/Substance Use:       Infectious Disease:    (-) Recent Fever   Malignancy:   (+) Malignancy (RIGHT lung),     Other:              Physical Exam  Airway  Mallampati: II  TM distance: >3 FB  Neck ROM: full  Mouth opening: >= 4 cm    Cardiovascular   Rhythm: regular  Rate: normal rate     Dental   (+) Minor Abnormalities - some fillings, tiny chips      Pulmonary Breath sounds clear to auscultation        Neurological   He appears awake, alert and oriented x3.    Other Findings       OUTSIDE LABS:  CBC:   Lab Results   Component Value Date    WBC 4.8 03/10/2025    WBC 6.4 01/16/2025    HGB 13.4 03/10/2025    HGB 13.3 01/16/2025    HCT 42.2 03/10/2025    HCT 41.4 01/16/2025     03/10/2025     01/16/2025     BMP:   Lab Results   Component Value Date     01/16/2025     10/02/2024    POTASSIUM 4.7 01/16/2025    POTASSIUM 4.1 10/02/2024    CHLORIDE 103 01/16/2025    CHLORIDE 102 10/02/2024    CO2 24 01/16/2025    CO2 22 10/02/2024    BUN 23.6 (H) 01/16/2025    BUN 24.1 (H) 10/02/2024    CR 1.27 (H) 03/10/2025    CR 1.29 (H) 01/16/2025     (H) 01/16/2025     (H) 10/02/2024     COAGS:   Lab Results   Component Value Date    PTT 31 07/24/2006    INR 0.95 07/24/2006     POC: No results found for: \"BGM\", \"HCG\", \"HCGS\"  HEPATIC:   Lab Results   Component Value Date    ALBUMIN 4.3 03/10/2025    PROTTOTAL 6.8 07/30/2024    ALT 19 " "03/10/2025    AST 23 03/10/2025    ALKPHOS 56 07/30/2024    BILITOTAL 0.6 07/30/2024     OTHER:   Lab Results   Component Value Date    A1C 6.6 (H) 03/10/2025    PRESTON 9.8 01/16/2025    PHOS 3.2 03/10/2025    LIPASE 72 07/11/2006    AMYLASE <30 (L) 07/11/2006    TSH 4.14 (H) 02/21/2019    T4 0.78 02/21/2019    CRP 30.0 (H) 07/28/2006    SED 11 03/10/2025       Anesthesia Plan    ASA Status:  2      NPO Status: NPO Appropriate   Anesthesia Type: MAC.   Induction: intravenous.        Consents    Anesthesia Plan(s) and associated risks, benefits, and realistic alternatives discussed. Questions answered and patient/representative(s) expressed understanding.     - Discussed:     - Discussed with:  Patient            Postoperative Care            Comments:                   Brunilda Irving MD    I have reviewed the pertinent notes and labs in the chart from the past 30 days and (re)examined the patient.  Any updates or changes from those notes are reflected in this note.    Clinically Significant Risk Factors Present on Admission                   # Hypertension: Home medication list includes antihypertensive(s)          # DMII: A1C = 6.6 % (Ref range: 0.0 - 5.6 %) within past 6 months    # Overweight: Estimated body mass index is 25.1 kg/m  as calculated from the following:    Height as of 5/5/25: 1.88 m (6' 2\").    Weight as of 5/5/25: 88.7 kg (195 lb 8 oz).                    "

## 2025-05-14 LAB
PATH REPORT.COMMENTS IMP SPEC: NORMAL
PATH REPORT.COMMENTS IMP SPEC: NORMAL
PATH REPORT.FINAL DX SPEC: NORMAL
PATH REPORT.GROSS SPEC: NORMAL
PATH REPORT.MICROSCOPIC SPEC OTHER STN: NORMAL
PATH REPORT.RELEVANT HX SPEC: NORMAL
PHOTO IMAGE: NORMAL

## 2025-05-14 PROCEDURE — 88305 TISSUE EXAM BY PATHOLOGIST: CPT | Mod: 26 | Performed by: PATHOLOGY

## 2025-05-19 DIAGNOSIS — E11.22 TYPE 2 DIABETES MELLITUS WITH STAGE 1 CHRONIC KIDNEY DISEASE, WITH LONG-TERM CURRENT USE OF INSULIN (H): ICD-10-CM

## 2025-05-19 DIAGNOSIS — Z79.4 TYPE 2 DIABETES MELLITUS WITH STAGE 1 CHRONIC KIDNEY DISEASE, WITH LONG-TERM CURRENT USE OF INSULIN (H): ICD-10-CM

## 2025-05-19 DIAGNOSIS — N18.1 TYPE 2 DIABETES MELLITUS WITH STAGE 1 CHRONIC KIDNEY DISEASE, WITH LONG-TERM CURRENT USE OF INSULIN (H): ICD-10-CM

## 2025-05-19 RX ORDER — EMPAGLIFLOZIN 10 MG/1
10 TABLET, FILM COATED ORAL DAILY
Qty: 90 TABLET | Refills: 1 | Status: SHIPPED | OUTPATIENT
Start: 2025-05-19

## 2025-05-27 ENCOUNTER — OFFICE VISIT (OUTPATIENT)
Dept: PEDIATRICS | Facility: CLINIC | Age: 80
End: 2025-05-27
Payer: COMMERCIAL

## 2025-05-27 VITALS
BODY MASS INDEX: 25.15 KG/M2 | WEIGHT: 196 LBS | SYSTOLIC BLOOD PRESSURE: 102 MMHG | RESPIRATION RATE: 12 BRPM | HEART RATE: 60 BPM | HEIGHT: 74 IN | OXYGEN SATURATION: 96 % | DIASTOLIC BLOOD PRESSURE: 56 MMHG | TEMPERATURE: 97.5 F

## 2025-05-27 DIAGNOSIS — Z79.4 TYPE 2 DIABETES MELLITUS WITH STAGE 1 CHRONIC KIDNEY DISEASE, WITH LONG-TERM CURRENT USE OF INSULIN (H): Primary | ICD-10-CM

## 2025-05-27 DIAGNOSIS — G47.33 OSA (OBSTRUCTIVE SLEEP APNEA): ICD-10-CM

## 2025-05-27 DIAGNOSIS — Z23 NEED FOR COVID-19 VACCINE: ICD-10-CM

## 2025-05-27 DIAGNOSIS — J44.9 STAGE 3 SEVERE COPD BY GOLD CLASSIFICATION (H): ICD-10-CM

## 2025-05-27 DIAGNOSIS — E11.22 TYPE 2 DIABETES MELLITUS WITH STAGE 1 CHRONIC KIDNEY DISEASE, WITH LONG-TERM CURRENT USE OF INSULIN (H): Primary | ICD-10-CM

## 2025-05-27 DIAGNOSIS — N18.1 TYPE 2 DIABETES MELLITUS WITH STAGE 1 CHRONIC KIDNEY DISEASE, WITH LONG-TERM CURRENT USE OF INSULIN (H): Primary | ICD-10-CM

## 2025-05-27 PROCEDURE — 99213 OFFICE O/P EST LOW 20 MIN: CPT | Performed by: INTERNAL MEDICINE

## 2025-05-27 PROCEDURE — 3078F DIAST BP <80 MM HG: CPT | Performed by: INTERNAL MEDICINE

## 2025-05-27 PROCEDURE — 91320 SARSCV2 VAC 30MCG TRS-SUC IM: CPT | Performed by: INTERNAL MEDICINE

## 2025-05-27 PROCEDURE — G2211 COMPLEX E/M VISIT ADD ON: HCPCS | Performed by: INTERNAL MEDICINE

## 2025-05-27 PROCEDURE — 90480 ADMN SARSCOV2 VAC 1/ONLY CMP: CPT | Performed by: INTERNAL MEDICINE

## 2025-05-27 PROCEDURE — 3074F SYST BP LT 130 MM HG: CPT | Performed by: INTERNAL MEDICINE

## 2025-05-27 NOTE — PROGRESS NOTES
"  Assessment & Plan     Type 2 diabetes mellitus with stage 1 chronic kidney disease, with long-term current use of insulin (H)  Reasonable control  - Albumin Random Urine Quantitative with Creat Ratio; Future  - Hemoglobin A1c; Future    Stage 3 severe COPD by GOLD classification (H)  Symptoms stable.     JOHAN (obstructive sleep apnea)  Some difficulty getting NC O2 to stay on at night. Discussed strategies for this    Need for COVID-19 vaccine    - COVID-19 12+ (PFIZER)    The longitudinal plan of care for the diagnosis(es)/condition(s) as documented were addressed during this visit. Due to the added complexity in care, I will continue to support Theo in the subsequent management and with ongoing continuity of care.      BMI  Estimated body mass index is 25.16 kg/m  as calculated from the following:    Height as of this encounter: 1.88 m (6' 2\").    Weight as of this encounter: 88.9 kg (196 lb).     Blood sugar testing frequency justification:  Adjustment of medication(s)    Follow-up  Return in about 4 months (around 9/27/2025).    Subjective   Theo is a 79 year old, presenting for the following health issues:  Follow Up (3 month DM check )        5/27/2025     1:45 PM   Additional Questions   Roomed by Andria Tolliver         5/27/2025     1:45 PM   Patient Reported Additional Medications   Patient reports taking the following new medications none     History of Present Illness       Diabetes:   He presents for follow up of diabetes.  He is checking home blood glucose two times daily.   He checks blood glucose before meals, after meals and before and after meals.  Blood glucose is never over 200 and never under 70.  When his blood glucose is low, the patient is asymptomatic for confusion, blurred vision, lethargy and reports not feeling dizzy, shaky, or weak.   He has no concerns regarding his diabetes at this time.  He is having numbness in feet.            He eats 2-3 servings of fruits and vegetables daily.He " "consumes 0 sweetened beverage(s) daily.He exercises with enough effort to increase his heart rate 20 to 29 minutes per day.  He exercises with enough effort to increase his heart rate 3 or less days per week.   He is taking medications regularly.        Did take ibuprofen once daily for maybe 2 weeks last summer. Unclear what else could have caused his decrease in GFR.        Diabetes Follow-up    How often are you checking your blood sugar? One time daily  What time of day are you checking your blood sugars (select all that apply)?  Before meals  Have you had any blood sugars above 200?  No  Have you had any blood sugars below 70?  No  What symptoms do you notice when your blood sugar is low?  None  What concerns do you have today about your diabetes? None   Do you have any of these symptoms? (Select all that apply)  No numbness or tingling in feet.  No redness, sores or blisters on feet.  No complaints of excessive thirst.  No reports of blurry vision.  No significant changes to weight.      BP Readings from Last 2 Encounters:   05/27/25 102/56   05/13/25 108/62     Hemoglobin A1C (%)   Date Value   03/10/2025 6.6 (H)   12/02/2024 7.0 (H)   04/26/2021 6.0 (H)   09/22/2020 5.7 (H)     LDL Cholesterol Calculated (mg/dL)   Date Value   10/02/2024 52   01/22/2024 48   09/22/2020 44   07/17/2019 54             Review of Systems  Constitutional, HEENT, cardiovascular, pulmonary, gi and gu systems are negative, except as otherwise noted.      Objective    /56 (BP Location: Right arm, Patient Position: Sitting, Cuff Size: Adult Regular)   Pulse 60   Temp 97.5  F (36.4  C) (Temporal)   Resp 12   Ht 1.88 m (6' 2\")   Wt 88.9 kg (196 lb)   SpO2 96%   BMI 25.16 kg/m    Body mass index is 25.16 kg/m .  Physical Exam   GENERAL: alert and no distress  NECK: no adenopathy, no asymmetry, masses, or scars  RESP: lungs clear to auscultation - no rales, rhonchi or wheezes  CV: regular rate and rhythm, normal S1 S2, no S3 " "or S4, no murmur, click or rub, no peripheral edema  MS: no gross musculoskeletal defects noted, no edema    Admission on 05/13/2025, Discharged on 05/13/2025   Component Date Value Ref Range Status    Case Report 05/13/2025    Final                    Value:Surgical Pathology Report                         Case: [id number]                                  Authorizing Provider:  Nadeem Mohr MD           Collected:           05/13/2025 10:59 AM          Ordering Location:     [facility]          Received:            05/13/2025 11:58 AM                                 [facility]                                                          Pathologist:           Chuy Arredondo MD                                                            Specimen:    Large Intestine, Colon, Ascending, x3                                                      Final Diagnosis 05/13/2025    Final                    Value:Colon, ascending, polypectomies:  --Tubular adenomas.      Clinical Information 05/13/2025    Final                    Value:Procedure:  COLONOSCOPY, FLEXIBLE, WITH LESION REMOVAL USING SNARE  Pre-op Diagnosis: Screening for colon cancer [Z12.11]  Post-op Diagnosis: Z12.11 - Screening for colon cancer [ICD-10-CM]      Gross Description 05/13/2025    Final                    Value:A(1). Large Intestine, Colon, Ascending, x3:  The specimen is received in formalin, labeled with the patient's name, medical record number and other identifying information designated \"x 3\". It consists of 9 tan-pink soft tissue fragments, 0.3-0.7 cm.  The 2 largest fragments are inked blue, black, and sectioned.  Submitted entirely in 2 cassettes.   (NICKY Sy)5/13/2025 12:11 PM       Microscopic Description 05/13/2025    Final                    Value:Microscopic examination was performed.        Performing Labs 05/13/2025    Final                    Value:The technical component of this testing was completed at [facility].    Stain controls for all stains resulted within this report have been reviewed and show appropriate reactivity.       COLONOSCOPY 05/13/2025    Final                    Value:[facility]  [address] [place]  [address]  _______________________________________________________________________________  Patient Name: Lauri Mcgee            Procedure Date: 5/13/2025 10:38 AM  MRN: [id number]                       Account Number: [id number]  YOB: 1945               Admit Type: Outpatient  Age: 79                               Room: [place] [address]  Note Status: Finalized                Attending MD: SUGEY GONZALEZ , ,   Instrument Name: 516 [id number] Peds Colon   _______________________________________________________________________________     Procedure:                Colonoscopy  Indications:              High risk colon cancer surveillance: Personal                             history of adenoma (10 mm or greater in size), High                             risk colon cancer surveillance: Personal history of                             sessile serrated colon polyp (10 mm or greater in                             size), Family history                           of colon cancer in a                             first-degree relative before age 60 years  Providers:                Ailin GIBBS RN  Referring MD:             RODOLFO LYONS MD  Medicines:                Monitored Anesthesia Care  Complications:            No immediate complications.  _______________________________________________________________________________  Procedure:                After obtaining informed consent, the colonoscope                             was passed under direct vision. Throughout the                             procedure, the patient's blood pressure, pulse, and                             oxygen saturations were  monitored continuously. The                             peds colonoscope 516 was introduced through the                             anus and advanced to the cecum, identified by                             appendiceal orifice and ileocecal valve. The                             colonoscopy was performed without difficulty. T                          he                             patient tolerated the procedure well. The quality                             of the bowel preparation was adequate.                                                                                   Findings:       The perianal and digital rectal examinations were normal.       Three sessile polyps were found in the ascending colon. The polyps were        6 to 10 mm in size. These polyps were removed with a cold snare.        Resection and retrieval were complete.       Many small and large-mouthed diverticula were found in the entire colon.       Non-bleeding internal hemorrhoids were found during retroflexion. The        hemorrhoids were small.                                                                                   Impression:               - Three 6 to 10 mm polyps in the ascending colon,                             removed with a cold snare. Resected and retrieved.                            - Diverticulosis in the entire examined colon.                                                      - Non-bleeding internal hemorrhoids.  Recommendation:           - Discharge patient to home.                            - Await pathology results.                            - Resume regular diet.                            - Repeat colonoscopy for surveillance based on                             pathology results and clinical status given age and                             comorbidities.                                                                                   Procedure Code(s):       --- Professional ---       52377, Colonoscopy,  flexible; with removal of tumor(s), polyp(s), or        other lesion(s) by snare technique  Diagnosis Code(s):       --- Professional ---       D12.2, Benign neoplasm of ascending colon       K64.8, Other hemorrhoids       Z86.010, Personal history of colonic polyps       Z80.0, Family history of malignant neoplasm of digestive organs       K57.30, Diverticulosis of large intestine without perforation or abscess        witho                          ut bleeding    CPT copyright 2022 [employer]. All rights reserved.    The codes documented in this report are preliminary and upon  review may   be revised to meet current compliance requirements.    _____________  SUGEY GONZALEZ,   5/13/2025 11:23:53 AM  I was physically present for the entire viewing portion of the exam.  SUGEY GONZALEZ  Number of Addenda: 0    Note Initiated On: 5/13/2025 10:38 AM  Scope Withdrawal Time: 0 hours 17 minutes 3 seconds   Total Procedure Duration: 0 hours 22 minutes 0 seconds   Estimated Blood Loss:       Estimated blood loss was minimal.  Scope In: 10:51:19 AM  Scope Out: 11:13:19 AM             Signed Electronically by: Yenni Sewell MD

## 2025-05-27 NOTE — PATIENT INSTRUCTIONS
Keep medications the same.    You can see me after you see nephrology if you would like,  Otherwise we can meet for your preventive in October.    You could try paper tape to help your oxygen cannula stay on at night.   Set up follow up with pulmonology.

## 2025-06-16 ENCOUNTER — LAB (OUTPATIENT)
Dept: LAB | Facility: CLINIC | Age: 80
End: 2025-06-16
Payer: COMMERCIAL

## 2025-06-16 DIAGNOSIS — N13.5 OCCLUSION OF URETER: ICD-10-CM

## 2025-06-16 DIAGNOSIS — Z79.899 ENCOUNTER FOR LONG-TERM (CURRENT) USE OF MEDICATIONS: ICD-10-CM

## 2025-06-16 DIAGNOSIS — E11.22 TYPE 2 DIABETES MELLITUS WITH STAGE 1 CHRONIC KIDNEY DISEASE, WITH LONG-TERM CURRENT USE OF INSULIN (H): ICD-10-CM

## 2025-06-16 DIAGNOSIS — Z79.4 TYPE 2 DIABETES MELLITUS WITH STAGE 1 CHRONIC KIDNEY DISEASE, WITH LONG-TERM CURRENT USE OF INSULIN (H): ICD-10-CM

## 2025-06-16 DIAGNOSIS — Z13.1 SCREENING FOR DIABETES MELLITUS: Primary | ICD-10-CM

## 2025-06-16 DIAGNOSIS — N18.1 TYPE 2 DIABETES MELLITUS WITH STAGE 1 CHRONIC KIDNEY DISEASE, WITH LONG-TERM CURRENT USE OF INSULIN (H): ICD-10-CM

## 2025-06-16 LAB
ALBUMIN SERPL BCG-MCNC: 4.2 G/DL (ref 3.5–5.2)
ALT SERPL W P-5'-P-CCNC: 13 U/L (ref 0–70)
AST SERPL W P-5'-P-CCNC: 19 U/L (ref 0–45)
BASOPHILS # BLD AUTO: 0 10E3/UL (ref 0–0.2)
BASOPHILS NFR BLD AUTO: 1 %
CREAT SERPL-MCNC: 1.31 MG/DL (ref 0.67–1.17)
CREAT UR-MCNC: 151 MG/DL
CRP SERPL-MCNC: <3 MG/L
EGFRCR SERPLBLD CKD-EPI 2021: 55 ML/MIN/1.73M2
EOSINOPHIL # BLD AUTO: 0.1 10E3/UL (ref 0–0.7)
EOSINOPHIL NFR BLD AUTO: 2 %
ERYTHROCYTE [DISTWIDTH] IN BLOOD BY AUTOMATED COUNT: 14.2 % (ref 10–15)
ERYTHROCYTE [SEDIMENTATION RATE] IN BLOOD BY WESTERGREN METHOD: 11 MM/HR (ref 0–20)
EST. AVERAGE GLUCOSE BLD GHB EST-MCNC: 148 MG/DL
HBA1C MFR BLD: 6.8 % (ref 0–5.6)
HCT VFR BLD AUTO: 40.5 % (ref 40–53)
HGB BLD-MCNC: 12.9 G/DL (ref 13.3–17.7)
IMM GRANULOCYTES # BLD: 0 10E3/UL
IMM GRANULOCYTES NFR BLD: 0 %
LYMPHOCYTES # BLD AUTO: 0.6 10E3/UL (ref 0.8–5.3)
LYMPHOCYTES NFR BLD AUTO: 10 %
MCH RBC QN AUTO: 33.3 PG (ref 26.5–33)
MCHC RBC AUTO-ENTMCNC: 31.9 G/DL (ref 31.5–36.5)
MCV RBC AUTO: 105 FL (ref 78–100)
MICROALBUMIN UR-MCNC: 44.7 MG/L
MICROALBUMIN/CREAT UR: 29.6 MG/G CR (ref 0–17)
MONOCYTES # BLD AUTO: 0.7 10E3/UL (ref 0–1.3)
MONOCYTES NFR BLD AUTO: 11 %
NEUTROPHILS # BLD AUTO: 4.9 10E3/UL (ref 1.6–8.3)
NEUTROPHILS NFR BLD AUTO: 77 %
PLATELET # BLD AUTO: 222 10E3/UL (ref 150–450)
RBC # BLD AUTO: 3.87 10E6/UL (ref 4.4–5.9)
WBC # BLD AUTO: 6.4 10E3/UL (ref 4–11)

## 2025-06-16 PROCEDURE — 82043 UR ALBUMIN QUANTITATIVE: CPT

## 2025-06-16 PROCEDURE — 83036 HEMOGLOBIN GLYCOSYLATED A1C: CPT

## 2025-06-16 PROCEDURE — 82565 ASSAY OF CREATININE: CPT

## 2025-06-16 PROCEDURE — 3044F HG A1C LEVEL LT 7.0%: CPT

## 2025-06-16 PROCEDURE — 82570 ASSAY OF URINE CREATININE: CPT

## 2025-06-16 PROCEDURE — 85652 RBC SED RATE AUTOMATED: CPT

## 2025-06-16 PROCEDURE — 85025 COMPLETE CBC W/AUTO DIFF WBC: CPT

## 2025-06-16 PROCEDURE — 82040 ASSAY OF SERUM ALBUMIN: CPT

## 2025-06-16 PROCEDURE — 84460 ALANINE AMINO (ALT) (SGPT): CPT

## 2025-06-16 PROCEDURE — 36415 COLL VENOUS BLD VENIPUNCTURE: CPT

## 2025-06-16 PROCEDURE — 86140 C-REACTIVE PROTEIN: CPT

## 2025-06-16 PROCEDURE — 84450 TRANSFERASE (AST) (SGOT): CPT

## 2025-06-30 ENCOUNTER — RESULTS FOLLOW-UP (OUTPATIENT)
Dept: PEDIATRICS | Facility: CLINIC | Age: 80
End: 2025-06-30
Payer: COMMERCIAL

## 2025-07-14 NOTE — TELEPHONE ENCOUNTER
BJORN Recruitment: [employer]     Referral outreach attempt #1 on July 14, 2025      Outcome: left voicemail- Call back number [phone] and MyChart message sent    BJORN Lackey   [phone]

## 2025-07-15 NOTE — PROGRESS NOTES
Medication Therapy Management (MTM) Encounter    ASSESSMENT:                            Medication Adherence/Access: No issues identified.    Diabetes /CKD  A1c at goal <8%. Fasting blood glucose at goal <150 mg/dL.   Appropriately on SGLT2i and low dose ACEi, though blood pressure trends low. Continue to monitor.     Hyperlipidemia   LDL at goal <70 mg/dL.     COPD   Stable, continue current regimen.      GERD    Stable, continue current regimen.      Pain   Stable, continue current regimen.      Supplements   Stable, continue current regimen.      Retroperitoneal Fibrosis  Stable, continue current regimen.     PLAN:                            Continue current regimen    Follow-up: Return if symptoms worsen or fail to improve.    SUBJECTIVE/OBJECTIVE:                          Theo Mcgee is a 79 year old male seen for a yearly medication review. He was referred to MTM by their insurance plan.      Reason for visit: Annual comprehensive medication review - no specific patient concerns.    Allergies/ADRs: Reviewed in chart  Past Medical History: Reviewed in chart  Tobacco: He reports that he quit smoking about 11 years ago. His smoking use included cigarettes, cigars, and pipe. He started smoking about 59 years ago. He has a 23.8 pack-year smoking history. He has been exposed to tobacco smoke. He has never used smokeless tobacco.  Alcohol: not currently using    Medication Adherence/Access: Patient takes medications directly from bottles.  Per patient, misses medication maybe once every 3 months.   The patient fills medications at [facility]: NO, fills medications at [facility] in [place].    Diabetes /CKD  Metformin XR 1500 mg daily   Jardiance 10 mg daily   Lisinopril 2.5 mg daily     Not taking aspirin due to age.  Patient is experiencing the following side effects: occasional dizziness (especially when hot and humid).   Current diabetes symptoms: Occasional numbness/tingling in feet  Diet/Exercise: Eats three meals daily,  makes them at home mostly. No regular exercise currently.   Follows with nephrology Dr. Gary Perkins.      Blood sugar monitoring: Takes occasionally at home. Reports average fasting 110-120 mg/dL.    Blood pressure monitoring at home: reports generally 100/60 mmHg.  Eye exam is up to date  Foot exam: due    Hyperlipidemia   Atorvastatin 10 mg daily   Patient reports no significant myalgias or flushing symptoms.     COPD   Albuterol HFA as needed - seldom use  Trelegy Ellipta 1 puff daily       Patient reports no current medication side effects.    Patient reports the following symptoms: none.  Uses oxygen if he is going to be walking around a lot, does not use at rest.      GERD    Famotidine 20 mg twice daily    Patient reports no current symptoms. Gets breakthrough symptoms occasionally, but does not need anything else for it.   Patient feels that current regimen is effective.     Pain   Gabapentin 300 mg every night at bedtime + 100-200 mg twice daily as needed back pain    Takes gabapentin once at bedtime, has not needed to take it during the day recently.   Pain is in back/hip/leg. Describes as an ache  Patient feels that current therapy is effective.   Patient reports the following side effects: no medication side effects     Supplements   D3 1000 units daily   No reported issues at this time.      Retroperitoneal Fibrosis  Azathioprine 50 mg daily   Prednisone 2.5 mg daily     Maintenance dose of azathiprine/prednisone currently. Has not had a recurrence of fibrosis while on maintenance dose.   Following with rheumatology Dr. Duncan Rosas.   Patient reports no concerns or side effects at this time.        Today's Vitals: There were no vitals taken for this visit. - virtual  ----------------    I spent 18 minutes with this patient today. All changes were made via collaborative practice agreement with Yenni Sewell MD.     A summary of these recommendations was sent via [employer].    Karlie Boykin PharmD    Medication Therapy Management (MTM)  Pharmacist Practitioner       Telemedicine Visit Details  The patient's medications can be safely assessed via a telemedicine encounter.  Type of service:  Telephone visit  Originating Location (pt. Location): Home    Distant Location (provider location):  Off-site  Start Time: 9:00 AM  End Time: 9:18 AM     Medication Therapy Recommendations  No medication therapy recommendations to display

## 2025-07-17 ENCOUNTER — VIRTUAL VISIT (OUTPATIENT)
Dept: PHARMACY | Facility: CLINIC | Age: 80
End: 2025-07-17
Payer: COMMERCIAL

## 2025-07-17 DIAGNOSIS — J44.9 STAGE 3 SEVERE COPD BY GOLD CLASSIFICATION (H): ICD-10-CM

## 2025-07-17 DIAGNOSIS — K21.9 GASTROESOPHAGEAL REFLUX DISEASE WITHOUT ESOPHAGITIS: ICD-10-CM

## 2025-07-17 DIAGNOSIS — E11.22 TYPE 2 DIABETES MELLITUS WITH STAGE 1 CHRONIC KIDNEY DISEASE, WITH LONG-TERM CURRENT USE OF INSULIN (H): Primary | ICD-10-CM

## 2025-07-17 DIAGNOSIS — Z79.4 TYPE 2 DIABETES MELLITUS WITH STAGE 1 CHRONIC KIDNEY DISEASE, WITH LONG-TERM CURRENT USE OF INSULIN (H): Primary | ICD-10-CM

## 2025-07-17 DIAGNOSIS — N18.31 STAGE 3A CHRONIC KIDNEY DISEASE (H): ICD-10-CM

## 2025-07-17 DIAGNOSIS — E78.00 HYPERCHOLESTEROLEMIA: ICD-10-CM

## 2025-07-17 DIAGNOSIS — K68.2 RETROPERITONEAL FIBROSIS: ICD-10-CM

## 2025-07-17 DIAGNOSIS — Z78.9 TAKES DIETARY SUPPLEMENTS: ICD-10-CM

## 2025-07-17 DIAGNOSIS — N18.1 TYPE 2 DIABETES MELLITUS WITH STAGE 1 CHRONIC KIDNEY DISEASE, WITH LONG-TERM CURRENT USE OF INSULIN (H): Primary | ICD-10-CM

## 2025-07-17 DIAGNOSIS — M54.17 LUMBOSACRAL RADICULOPATHY: ICD-10-CM

## 2025-07-17 RX ORDER — PREDNISONE 2.5 MG/1
2.5 TABLET ORAL DAILY
COMMUNITY

## 2025-07-17 NOTE — PATIENT INSTRUCTIONS
"Recommendations from today's MTM visit:                                                    MTM (medication therapy management) is a service provided by a clinical pharmacist designed to help you get the most of out of your medicines.   Today we reviewed what your medicines are for, how to know if they are working, that your medicines are safe and how to make your medicine regimen as easy as possible.      Continue current regimen    Follow-up: Return if symptoms worsen or fail to improve.    It was great speaking with you today.  I value your experience and would be very thankful for your time in providing feedback in our clinic survey. In the next few days, you may receive an email or text message from [employer] with a link to a survey related to your  clinical pharmacist.\"     To schedule another MTM appointment, please call the clinic directly or you may call the MTM scheduling line at [phone] or toll-free at [phone].     My Clinical Pharmacist's contact information:                                                      Please feel free to contact me with any questions or concerns you have.      Karlie Boykin, PharmD   Medication Therapy Management (MTM)  Pharmacist Practitioner       "

## 2025-07-17 NOTE — LETTER
"Recommended To-Do List      Prepared on: Jul 17, 2025       You can get the best results from your medications by completing the items on this \"To-Do List.\"      Bring your To-Do List when you go to your doctor. And, share it with your family or caregivers.    My To-Do List:  What we talked about: What I should do:    What my medicines are for, how to know if my medicines are working, made sure my medicines are safe for me and reviewed how to take my medicines.      Take my medicines every day                "

## 2025-07-17 NOTE — LETTER
_  Medication List        Prepared on: Jul 17, 2025     Bring your Medication List when you go to the doctor, hospital, or   emergency room. And, share it with your family or caregivers.     Note any changes to how you take your medications.  Cross out medications when you no longer use them.    Medication How I take it Why I use it Prescriber   albuterol (PROAIR HFA/PROVENTIL HFA/VENTOLIN HFA) 108 (90 Base) MCG/ACT inhaler Inhale 2 puffs into the lungs every 4 hours as needed for shortness of breath, wheezing or cough Stage 3 severe COPD by GOLD classification (H) Yenni Sewell MD   atorvastatin (LIPITOR) 10 MG tablet Take 1 tablet (10 mg) by mouth daily Hyperlipidemia Yenni Sewell MD   azaTHIOprine (IMURAN) 50 MG tablet Take 50 mg by mouth daily Retroperitoneal Fibrosis Duncan Rosas MD    Cholecalciferol (VITAMIN D) 1000 UNITS capsule Take 1 capsule by mouth daily. General health Patient Reported   empagliflozin (JARDIANCE) 10 MG TABS tablet Take 1 tablet by mouth once daily Type 2 diabetes mellitus with stage 1 chronic kidney disease, with long-term current use of insulin (H) Yenni Sewell MD   famotidine (PEPCID) 20 MG tablet Take 1 tablet (20 mg) by mouth 2 times daily Heartburn Yenni Sewell MD   Fluticasone-Umeclidin-Vilant (TRELEGY ELLIPTA) 100-62.5-25 MCG/ACT oral inhaler INHALE 1 PUFF ONCE DAILY Stage 3 severe COPD by GOLD classification (H) Yenni Sewell MD   gabapentin (NEURONTIN) 100 MG capsule TAKE 3 CAPSULES BY MOUTH ONCE DAILY AT BEDTIME AND TAKE 1-2 CAPSULES TWICE DAILY AS NEEDED FOR BACK PAIN. Acute right-sided low back pain with right-sided sciatica Yenni Sewell MD   lisinopril (ZESTRIL) 2.5 MG tablet Take 1 tablet (2.5 mg) by mouth daily CKD Yenni Sewell MD   metFORMIN (GLUCOPHAGE XR) 500 MG 24 hr tablet Take 3 tablets (1,500 mg) by mouth daily (with dinner) Type 2 diabetes mellitus with stage 1 chronic kidney disease, with long-term current use of insulin  (H) Yenni Sewell MD   predniSONE (DELTASONE) 2.5 MG tablet Take 2.5 mg by mouth daily. Retroperitoneal Fibrosis Duncan Rosas MD          Add new medications, over-the-counter drugs, herbals, vitamins, or  minerals in the blank rows below.    Medication How I take it Why I use it Prescriber                                      Allergies:      - Smoke.        Side effects I have had:      Not on File        Other Information:              My notes and questions:

## 2025-07-17 NOTE — LETTER
July 17, 2025  Lauri Mcgee  [address] [place] [address]    Dear Xuan , [facility]     Thank you for talking with me on Jul 17, 2025 about your health and medications. As a follow-up to our conversation, I have included two documents:      Your Recommended To-Do List has steps you should take to get the best results from your medications.  Your Medication List will help you keep track of your medications and how to take them.    If you want to talk about these documents, please call Ashley Boykin RPH at phone: [phone], Monday-Friday 8-4:30pm.    I look forward to working with you and your doctors to make sure your medications work well for you.    Sincerely,  Ashley Boykin RPH  [facility] Pharmacist, [facility]

## 2025-07-30 ENCOUNTER — TRANSFERRED RECORDS (OUTPATIENT)
Dept: HEALTH INFORMATION MANAGEMENT | Facility: CLINIC | Age: 80
End: 2025-07-30

## 2025-07-30 ENCOUNTER — HOSPITAL ENCOUNTER (OUTPATIENT)
Dept: CT IMAGING | Facility: CLINIC | Age: 80
Discharge: HOME | End: 2025-07-30
Attending: PHYSICIAN ASSISTANT
Payer: COMMERCIAL

## 2025-07-30 DIAGNOSIS — R91.1 SOLITARY LUNG NODULE: ICD-10-CM

## 2025-07-30 PROCEDURE — 71250 CT THORAX DX C-: CPT

## 2025-08-06 DIAGNOSIS — Z13.6 ENCOUNTER FOR SCREENING FOR CARDIOVASCULAR DISORDERS: ICD-10-CM

## 2025-08-06 RX ORDER — LISINOPRIL 2.5 MG/1
2.5 TABLET ORAL DAILY
Qty: 90 TABLET | Refills: 0 | Status: SHIPPED | OUTPATIENT
Start: 2025-08-06

## 2025-08-12 ENCOUNTER — LAB (OUTPATIENT)
Dept: LAB | Facility: CLINIC | Age: 80
End: 2025-08-12
Payer: COMMERCIAL

## 2025-08-12 ENCOUNTER — OFFICE VISIT (OUTPATIENT)
Dept: NEPHROLOGY | Facility: CLINIC | Age: 80
End: 2025-08-12
Attending: INTERNAL MEDICINE
Payer: COMMERCIAL

## 2025-08-12 VITALS
WEIGHT: 192.3 LBS | HEIGHT: 74 IN | SYSTOLIC BLOOD PRESSURE: 108 MMHG | TEMPERATURE: 97.5 F | BODY MASS INDEX: 24.68 KG/M2 | OXYGEN SATURATION: 94 % | DIASTOLIC BLOOD PRESSURE: 65 MMHG | HEART RATE: 81 BPM

## 2025-08-12 DIAGNOSIS — R94.4 DECREASED GFR: ICD-10-CM

## 2025-08-12 DIAGNOSIS — N18.31 CKD STAGE 3A, GFR 45-59 ML/MIN (H): Primary | ICD-10-CM

## 2025-08-12 LAB
ALBUMIN MFR UR ELPH: 20.8 MG/DL
ALBUMIN SERPL BCG-MCNC: 4.5 G/DL (ref 3.5–5.2)
ANION GAP SERPL CALCULATED.3IONS-SCNC: 17 MMOL/L (ref 7–15)
BUN SERPL-MCNC: 23.5 MG/DL (ref 8–23)
CALCIUM SERPL-MCNC: 10.2 MG/DL (ref 8.8–10.4)
CHLORIDE SERPL-SCNC: 100 MMOL/L (ref 98–107)
CREAT SERPL-MCNC: 1.25 MG/DL (ref 0.67–1.17)
CREAT UR-MCNC: 113 MG/DL
EGFRCR SERPLBLD CKD-EPI 2021: 58 ML/MIN/1.73M2
GLUCOSE SERPL-MCNC: 151 MG/DL (ref 70–99)
HCO3 SERPL-SCNC: 20 MMOL/L (ref 22–29)
HGB BLD-MCNC: 13.6 G/DL (ref 13.3–17.7)
MCV RBC AUTO: 101.7 FL (ref 78–100)
PHOSPHATE SERPL-MCNC: 3.3 MG/DL (ref 2.5–4.5)
POTASSIUM SERPL-SCNC: 4.1 MMOL/L (ref 3.4–5.3)
PROT/CREAT 24H UR: 0.18 MG/MG CR (ref 0–0.2)
SODIUM SERPL-SCNC: 137 MMOL/L (ref 135–145)

## 2025-08-12 PROCEDURE — 36415 COLL VENOUS BLD VENIPUNCTURE: CPT | Performed by: PATHOLOGY

## 2025-08-12 PROCEDURE — 85018 HEMOGLOBIN: CPT | Performed by: PATHOLOGY

## 2025-08-12 PROCEDURE — G0463 HOSPITAL OUTPT CLINIC VISIT: HCPCS | Performed by: INTERNAL MEDICINE

## 2025-08-12 PROCEDURE — 80069 RENAL FUNCTION PANEL: CPT | Performed by: PATHOLOGY

## 2025-08-12 PROCEDURE — 84156 ASSAY OF PROTEIN URINE: CPT | Performed by: PATHOLOGY

## 2025-08-12 ASSESSMENT — PAIN SCALES - GENERAL: PAINLEVEL_OUTOF10: NO PAIN (0)

## 2025-08-15 ENCOUNTER — MYC MEDICAL ADVICE (OUTPATIENT)
Dept: NEPHROLOGY | Facility: CLINIC | Age: 80
End: 2025-08-15
Payer: COMMERCIAL

## 2025-08-21 DIAGNOSIS — N18.2 TYPE 2 DIABETES MELLITUS WITH STAGE 2 CHRONIC KIDNEY DISEASE, WITH LONG-TERM CURRENT USE OF INSULIN (H): ICD-10-CM

## 2025-08-21 DIAGNOSIS — E11.22 TYPE 2 DIABETES MELLITUS WITH STAGE 2 CHRONIC KIDNEY DISEASE, WITH LONG-TERM CURRENT USE OF INSULIN (H): ICD-10-CM

## 2025-08-21 DIAGNOSIS — Z79.4 TYPE 2 DIABETES MELLITUS WITH STAGE 2 CHRONIC KIDNEY DISEASE, WITH LONG-TERM CURRENT USE OF INSULIN (H): ICD-10-CM

## 2025-08-21 RX ORDER — LANCETS
EACH MISCELLANEOUS
Qty: 100 EACH | Refills: 2 | Status: SHIPPED | OUTPATIENT
Start: 2025-08-21

## 2025-09-01 DIAGNOSIS — R12 HEARTBURN: ICD-10-CM

## 2025-09-01 DIAGNOSIS — J44.9 STAGE 3 SEVERE COPD BY GOLD CLASSIFICATION (H): ICD-10-CM

## 2025-09-02 RX ORDER — FAMOTIDINE 20 MG/1
20 TABLET, FILM COATED ORAL 2 TIMES DAILY
Qty: 180 TABLET | Refills: 4 | Status: SHIPPED | OUTPATIENT
Start: 2025-09-02

## 2025-09-02 RX ORDER — FLUTICASONE FUROATE, UMECLIDINIUM BROMIDE AND VILANTEROL TRIFENATATE 100; 62.5; 25 UG/1; UG/1; UG/1
1 POWDER RESPIRATORY (INHALATION) DAILY
Qty: 90 EACH | Refills: 4 | Status: SHIPPED | OUTPATIENT
Start: 2025-09-02